# Patient Record
Sex: FEMALE
[De-identification: names, ages, dates, MRNs, and addresses within clinical notes are randomized per-mention and may not be internally consistent; named-entity substitution may affect disease eponyms.]

---

## 2020-04-16 ENCOUNTER — RX ONLY (OUTPATIENT)
Age: 50
Setting detail: RX ONLY
End: 2020-04-16

## 2020-07-27 ENCOUNTER — RX ONLY (OUTPATIENT)
Age: 50
Setting detail: RX ONLY
End: 2020-07-27

## 2020-07-27 RX ORDER — MINOCYCLINE HYDROCHLORIDE 100 MG/1
CAPSULE ORAL
Qty: 30 | Refills: 1 | Status: ERX | COMMUNITY
Start: 2020-07-27

## 2020-08-06 ENCOUNTER — APPOINTMENT (RX ONLY)
Dept: URBAN - METROPOLITAN AREA CLINIC 28 | Facility: CLINIC | Age: 50
Setting detail: DERMATOLOGY
End: 2020-08-06

## 2020-08-06 DIAGNOSIS — L73.9 FOLLICULAR DISORDER, UNSPECIFIED: ICD-10-CM

## 2020-08-06 DIAGNOSIS — Z79.899 OTHER LONG TERM (CURRENT) DRUG THERAPY: ICD-10-CM

## 2020-08-06 DIAGNOSIS — L663 OTHER SPECIFIED DISEASES OF HAIR AND HAIR FOLLICLES: ICD-10-CM

## 2020-08-06 DIAGNOSIS — L738 OTHER SPECIFIED DISEASES OF HAIR AND HAIR FOLLICLES: ICD-10-CM

## 2020-08-06 DIAGNOSIS — L70.8 OTHER ACNE: ICD-10-CM | Status: IMPROVED

## 2020-08-06 DIAGNOSIS — L72.0 EPIDERMAL CYST: ICD-10-CM

## 2020-08-06 PROBLEM — L02.821 FURUNCLE OF HEAD [ANY PART, EXCEPT FACE]: Status: ACTIVE | Noted: 2020-08-06

## 2020-08-06 PROCEDURE — ? ACNE SURGERY (MULTIPLE LESIONS)

## 2020-08-06 PROCEDURE — ? PRESCRIPTION

## 2020-08-06 PROCEDURE — 99214 OFFICE O/P EST MOD 30 MIN: CPT | Mod: 25

## 2020-08-06 PROCEDURE — ? HIGH RISK MEDICATION MONITORING

## 2020-08-06 PROCEDURE — ? PRESCRIPTION MEDICATION MANAGEMENT

## 2020-08-06 PROCEDURE — 10040 EXTRACTION: CPT

## 2020-08-06 PROCEDURE — ? COUNSELING

## 2020-08-06 RX ORDER — SPIRONOLACTONE 100 MG/1
TABLET, FILM COATED ORAL
Qty: 30 | Refills: 3 | Status: ERX | COMMUNITY
Start: 2020-08-06

## 2020-08-06 RX ORDER — CLOBETASOL PROPIONATE 0.5 MG/G
AEROSOL, FOAM TOPICAL
Qty: 1 | Refills: 3 | Status: ERX | COMMUNITY
Start: 2020-08-06

## 2020-08-06 RX ORDER — MINOCYCLINE HYDROCHLORIDE 100 MG/1
CAPSULE ORAL
Qty: 30 | Refills: 3 | Status: ERX | COMMUNITY
Start: 2020-08-06

## 2020-08-06 RX ORDER — BENZOYL PEROXIDE 100 MG/ML
LIQUID TOPICAL DAILY
Qty: 1 | Refills: 3 | Status: ERX | COMMUNITY
Start: 2020-08-06

## 2020-08-06 RX ADMIN — BENZOYL PEROXIDE: 100 LIQUID TOPICAL at 00:00

## 2020-08-06 RX ADMIN — MINOCYCLINE HYDROCHLORIDE: 100 CAPSULE ORAL at 00:00

## 2020-08-06 RX ADMIN — CLOBETASOL PROPIONATE: 0.5 AEROSOL, FOAM TOPICAL at 00:00

## 2020-08-06 RX ADMIN — SPIRONOLACTONE 1: 100 TABLET, FILM COATED ORAL at 00:00

## 2020-08-06 ASSESSMENT — LOCATION ZONE DERM
LOCATION ZONE: LIP
LOCATION ZONE: FACE
LOCATION ZONE: SCALP

## 2020-08-06 ASSESSMENT — LOCATION SIMPLE DESCRIPTION DERM
LOCATION SIMPLE: RIGHT LIP
LOCATION SIMPLE: POSTERIOR SCALP
LOCATION SIMPLE: LEFT CHEEK
LOCATION SIMPLE: LEFT LIP

## 2020-08-06 ASSESSMENT — LOCATION DETAILED DESCRIPTION DERM
LOCATION DETAILED: LEFT UPPER CUTANEOUS LIP
LOCATION DETAILED: RIGHT INFERIOR VERMILION LIP
LOCATION DETAILED: LEFT INFERIOR CENTRAL MALAR CHEEK
LOCATION DETAILED: MID-OCCIPITAL SCALP

## 2020-08-06 NOTE — PROCEDURE: ACNE SURGERY (MULTIPLE LESIONS)
Total Number Of Lesions Treated: 2
Consent was obtained and risks were reviewed including but not limited to scarring, infection, bleeding, scabbing, incomplete removal, and allergy to anesthesia.
Acne Type: Milial cysts
Render Post-Care Instructions In Note?: no
Extraction Method: 25 gauge needle
Hemostasis: Electrocautery
Post-Care Instructions: I reviewed with the patient in detail post-care instructions. Patient is to keep the treatment areas dry overnight, and then apply bacitracin twice daily until healed. Patient may apply hydrogen peroxide soaks to remove any crusting.
Detail Level: Detailed
Prep Text (Optional): Prior to removal the treatment areas were prepped in the usual fashion.
Render Number Of Lesions Treated: yes

## 2020-08-06 NOTE — PROCEDURE: PRESCRIPTION MEDICATION MANAGEMENT
Render In Strict Bullet Format?: No
Detail Level: Zone
Initiate Treatment: Benzoyl Peroxide 10%  Cleanser - wash face QDAY
Discontinue Regimen: sulfur wash
Continue Regimen: Spironolactone 100mg- take one tab po QDAY
Initiate Treatment: Tovet Foam- apply a thin layer QDAY to AA of scalp and neck
Discontinue Regimen: Cordran Lotion
Continue Regimen: Minocycline 100mg- take one capsule po QDAY with food

## 2020-08-06 NOTE — PROCEDURE: HIGH RISK MEDICATION MONITORING
Azithromycin Pregnancy And Lactation Text: This medication is considered safe during pregnancy and is also secreted in breast milk.
Tremfya Pregnancy And Lactation Text: The risk during pregnancy and breastfeeding is uncertain with this medication.
Tazorac Counseling:  Patient advised that medication is irritating and drying.  Patient may need to apply sparingly and wash off after an hour before eventually leaving it on overnight.  The patient verbalized understanding of the proper use and possible adverse effects of tazorac.  All of the patient's questions and concerns were addressed.
Minocycline Pregnancy And Lactation Text: This medication is Pregnancy Category D and not consider safe during pregnancy. It is also excreted in breast milk.
Thalidomide Counseling: I discussed with the patient the risks of thalidomide including but not limited to birth defects, anxiety, weakness, chest pain, dizziness, cough and severe allergy.
Itraconazole Pregnancy And Lactation Text: This medication is Pregnancy Category C and it isn't know if it is safe during pregnancy. It is also excreted in breast milk.
Methotrexate Pregnancy And Lactation Text: This medication is Pregnancy Category X and is known to cause fetal harm. This medication is excreted in breast milk.
Taltz Counseling: I discussed with the patient the risks of ixekizumab including but not limited to immunosuppression, serious infections, worsening of inflammatory bowel disease and drug reactions.  The patient understands that monitoring is required including a PPD at baseline and must alert us or the primary physician if symptoms of infection or other concerning signs are noted.
Hydroquinone Counseling:  Patient advised that medication may result in skin irritation, lightening (hypopigmentation), dryness, and burning.  In the event of skin irritation, the patient was advised to reduce the amount of the drug applied or use it less frequently.  Rarely, spots that are treated with hydroquinone can become darker (pseudoochronosis).  Should this occur, patient instructed to stop medication and call the office. The patient verbalized understanding of the proper use and possible adverse effects of hydroquinone.  All of the patient's questions and concerns were addressed.
Humira Pregnancy And Lactation Text: This medication is Pregnancy Category B and is considered safe during pregnancy. It is unknown if this medication is excreted in breast milk.
Albendazole Pregnancy And Lactation Text: This medication is Pregnancy Category C and it isn't known if it is safe during pregnancy. It is also excreted in breast milk.
Quinolones Counseling:  I discussed with the patient the risks of fluoroquinolones including but not limited to GI upset, allergic reaction, drug rash, diarrhea, dizziness, photosensitivity, yeast infections, liver function test abnormalities, tendonitis/tendon rupture.
Nsaids Pregnancy And Lactation Text: These medications are considered safe up to 30 weeks gestation. It is excreted in breast milk.
Clofazimine Pregnancy And Lactation Text: This medication is Pregnancy Category C and isn't considered safe during pregnancy. It is excreted in breast milk.
Ivermectin Counseling:  Patient instructed to take medication on an empty stomach with a full glass of water.  Patient informed of potential adverse effects including but not limited to nausea, diarrhea, dizziness, itching, and swelling of the extremities or lymph nodes.  The patient verbalized understanding of the proper use and possible adverse effects of ivermectin.  All of the patient's questions and concerns were addressed.
Prednisone Counseling:  I discussed with the patient the risks of prolonged use of prednisone including but not limited to weight gain, insomnia, osteoporosis, mood changes, diabetes, susceptibility to infection, glaucoma and high blood pressure.  In cases where prednisone use is prolonged, patients should be monitored with blood pressure checks, serum glucose levels and an eye exam.  Additionally, the patient may need to be placed on GI prophylaxis, PCP prophylaxis, and calcium and vitamin D supplementation and/or a bisphosphonate.  The patient verbalized understanding of the proper use and the possible adverse effects of prednisone.  All of the patient's questions and concerns were addressed.
Bactrim Counseling:  I discussed with the patient the risks of sulfa antibiotics including but not limited to GI upset, allergic reaction, drug rash, diarrhea, dizziness, photosensitivity, and yeast infections.  Rarely, more serious reactions can occur including but not limited to aplastic anemia, agranulocytosis, methemoglobinemia, blood dyscrasias, liver or kidney failure, lung infiltrates or desquamative/blistering drug rashes.
Tazorac Pregnancy And Lactation Text: This medication is not safe during pregnancy. It is unknown if this medication is excreted in breast milk.
Xeljanz Counseling: I discussed with the patient the risks of Xeljanz therapy including increased risk of infection, liver issues, headache, diarrhea, or cold symptoms. Live vaccines should be avoided. They were instructed to call if they have any problems.
Thalidomide Pregnancy And Lactation Text: This medication is Pregnancy Category X and is absolutely contraindicated during pregnancy. It is unknown if it is excreted in breast milk.
Ilumya Counseling: I discussed with the patient the risks of tildrakizumab including but not limited to immunosuppression, malignancy, posterior leukoencephalopathy syndrome, and serious infections.  The patient understands that monitoring is required including a PPD at baseline and must alert us or the primary physician if symptoms of infection or other concerning signs are noted.
Ketoconazole Counseling:   Patient counseled regarding improving absorption with orange juice.  Adverse effects include but are not limited to breast enlargement, headache, diarrhea, nausea, upset stomach, liver function test abnormalities, taste disturbance, and stomach pain.  There is a rare possibility of liver failure that can occur when taking ketoconazole. The patient understands that monitoring of LFTs may be required, especially at baseline. The patient verbalized understanding of the proper use and possible adverse effects of ketoconazole.  All of the patient's questions and concerns were addressed.
Colchicine Counseling:  Patient counseled regarding adverse effects including but not limited to stomach upset (nausea, vomiting, stomach pain, or diarrhea).  Patient instructed to limit alcohol consumption while taking this medication.  Colchicine may reduce blood counts especially with prolonged use.  The patient understands that monitoring of kidney function and blood counts may be required, especially at baseline. The patient verbalized understanding of the proper use and possible adverse effects of colchicine.  All of the patient's questions and concerns were addressed.
Hydroquinone Pregnancy And Lactation Text: This medication has not been assigned a Pregnancy Risk Category but animal studies failed to show danger with the topical medication. It is unknown if the medication is excreted in breast milk.
Odomzo Counseling- I discussed with the patient the risks of Odomzo including but not limited to nausea, vomiting, diarrhea, constipation, weight loss, changes in the sense of taste, decreased appetite, muscle spasms, and hair loss.  The patient verbalized understanding of the proper use and possible adverse effects of Odomzo.  All of the patient's questions and concerns were addressed.
Xelvickiz Pregnancy And Lactation Text: This medication is Pregnancy Category D and is not considered safe during pregnancy.  The risk during breast feeding is also uncertain.
Topical Clindamycin Counseling: Patient counseled that this medication may cause skin irritation or allergic reactions.  In the event of skin irritation, the patient was advised to reduce the amount of the drug applied or use it less frequently.   The patient verbalized understanding of the proper use and possible adverse effects of clindamycin.  All of the patient's questions and concerns were addressed.
Benzoyl Peroxide Counseling: Patient counseled that medicine may cause skin irritation and bleach clothing.  In the event of skin irritation, the patient was advised to reduce the amount of the drug applied or use it less frequently.   The patient verbalized understanding of the proper use and possible adverse effects of benzoyl peroxide.  All of the patient's questions and concerns were addressed.
Prednisone Pregnancy And Lactation Text: This medication is Pregnancy Category C and it isn't know if it is safe during pregnancy. This medication is excreted in breast milk.
Bactrim Pregnancy And Lactation Text: This medication is Pregnancy Category D and is known to cause fetal risk.  It is also excreted in breast milk.
Valtrex Counseling: I discussed with the patient the risks of valacyclovir including but not limited to kidney damage, nausea, vomiting and severe allergy.  The patient understands that if the infection seems to be worsening or is not improving, they are to call.
Imiquimod Counseling:  I discussed with the patient the risks of imiquimod including but not limited to erythema, scaling, itching, weeping, crusting, and pain.  Patient understands that the inflammatory response to imiquimod is variable from person to person and was educated regarded proper titration schedule.  If flu-like symptoms develop, patient knows to discontinue the medication and contact us.
Xolair Counseling:  Patient informed of potential adverse effects including but not limited to fever, muscle aches, rash and allergic reactions.  The patient verbalized understanding of the proper use and possible adverse effects of Xolair.  All of the patient's questions and concerns were addressed.
Ketoconazole Pregnancy And Lactation Text: This medication is Pregnancy Category C and it isn't know if it is safe during pregnancy. It is also excreted in breast milk and breast feeding isn't recommended.
Topical Clindamycin Pregnancy And Lactation Text: This medication is Pregnancy Category B and is considered safe during pregnancy. It is unknown if it is excreted in breast milk.
Infliximab Counseling:  I discussed with the patient the risks of infliximab including but not limited to myelosuppression, immunosuppression, autoimmune hepatitis, demyelinating diseases, lymphoma, and serious infections.  The patient understands that monitoring is required including a PPD at baseline and must alert us or the primary physician if symptoms of infection or other concerning signs are noted.
Benzoyl Peroxide Pregnancy And Lactation Text: This medication is Pregnancy Category C. It is unknown if benzoyl peroxide is excreted in breast milk.
Cephalexin Counseling: I counseled the patient regarding use of cephalexin as an antibiotic for prophylactic and/or therapeutic purposes. Cephalexin (commonly prescribed under brand name Keflex) is a cephalosporin antibiotic which is active against numerous classes of bacteria, including most skin bacteria. Side effects may include nausea, diarrhea, gastrointestinal upset, rash, hives, yeast infections, and in rare cases, hepatitis, kidney disease, seizures, fever, confusion, neurologic symptoms, and others. Patients with severe allergies to penicillin medications are cautioned that there is about a 10% incidence of cross-reactivity with cephalosporins. When possible, patients with penicillin allergies should use alternatives to cephalosporins for antibiotic therapy.
Rifampin Counseling: I discussed with the patient the risks of rifampin including but not limited to liver damage, kidney damage, red-orange body fluids, nausea/vomiting and severe allergy.
Imiquimod Pregnancy And Lactation Text: This medication is Pregnancy Category C. It is unknown if this medication is excreted in breast milk.
Terbinafine Counseling: Patient counseling regarding adverse effects of terbinafine including but not limited to headache, diarrhea, rash, upset stomach, liver function test abnormalities, itching, taste/smell disturbance, nausea, abdominal pain, and flatulence.  There is a rare possibility of liver failure that can occur when taking terbinafine.  The patient understands that a baseline LFT and kidney function test may be required. The patient verbalized understanding of the proper use and possible adverse effects of terbinafine.  All of the patient's questions and concerns were addressed.
Valtrex Pregnancy And Lactation Text: this medication is Pregnancy Category B and is considered safe during pregnancy. This medication is not directly found in breast milk but it's metabolite acyclovir is present.
Dapsone Counseling: I discussed with the patient the risks of dapsone including but not limited to hemolytic anemia, agranulocytosis, rashes, methemoglobinemia, kidney failure, peripheral neuropathy, headaches, GI upset, and liver toxicity.  Patients who start dapsone require monitoring including baseline LFTs and weekly CBCs for the first month, then every month thereafter.  The patient verbalized understanding of the proper use and possible adverse effects of dapsone.  All of the patient's questions and concerns were addressed.
Otezla Counseling: The side effects of Otezla were discussed with the patient, including but not limited to worsening or new depression, weight loss, diarrhea, nausea, upper respiratory tract infection, and headache. Patient instructed to call the office should any adverse effect occur.  The patient verbalized understanding of the proper use and possible adverse effects of Otezla.  All the patient's questions and concerns were addressed.
Cephalexin Pregnancy And Lactation Text: This medication is Pregnancy Category B and considered safe during pregnancy.  It is also excreted in breast milk but can be used safely for shorter doses.
Topical Sulfur Applications Counseling: Topical Sulfur Counseling: Patient counseled that this medication may cause skin irritation or allergic reactions.  In the event of skin irritation, the patient was advised to reduce the amount of the drug applied or use it less frequently.   The patient verbalized understanding of the proper use and possible adverse effects of topical sulfur application.  All of the patient's questions and concerns were addressed.
Xolair Pregnancy And Lactation Text: This medication is Pregnancy Category B and is considered safe during pregnancy. This medication is excreted in breast milk.
Rifampin Pregnancy And Lactation Text: This medication is Pregnancy Category C and it isn't know if it is safe during pregnancy. It is also excreted in breast milk and should not be used if you are breast feeding.
Carac Counseling:  I discussed with the patient the risks of Carac including but not limited to erythema, scaling, itching, weeping, crusting, and pain.
Dapsone Pregnancy And Lactation Text: This medication is Pregnancy Category C and is not considered safe during pregnancy or breast feeding.
Minoxidil Counseling: Minoxidil is a topical medication which can increase blood flow where it is applied. It is uncertain how this medication increases hair growth. Side effects are uncommon and include stinging and allergic reactions.
Sarecycline Counseling: Patient advised regarding possible photosensitivity and discoloration of the teeth, skin, lips, tongue and gums.  Patient instructed to avoid sunlight, if possible.  When exposed to sunlight, patients should wear protective clothing, sunglasses, and sunscreen.  The patient was instructed to call the office immediately if the following severe adverse effects occur:  hearing changes, easy bruising/bleeding, severe headache, or vision changes.  The patient verbalized understanding of the proper use and possible adverse effects of sarecycline.  All of the patient's questions and concerns were addressed.
Rituxan Counseling:  I discussed with the patient the risks of Rituxan infusions. Side effects can include infusion reactions, severe drug rashes including mucocutaneous reactions, reactivation of latent hepatitis and other infections and rarely progressive multifocal leukoencephalopathy.  All of the patient's questions and concerns were addressed.
Otezla Pregnancy And Lactation Text: This medication is Pregnancy Category C and it isn't known if it is safe during pregnancy. It is unknown if it is excreted in breast milk.
Carac Pregnancy And Lactation Text: This medication is Pregnancy Category X and contraindicated in pregnancy and in women who may become pregnant. It is unknown if this medication is excreted in breast milk.
Clindamycin Counseling: I counseled the patient regarding use of clindamycin as an antibiotic for prophylactic and/or therapeutic purposes. Clindamycin is active against numerous classes of bacteria, including skin bacteria. Side effects may include nausea, diarrhea, gastrointestinal upset, rash, hives, yeast infections, and in rare cases, colitis.
Terbinafine Pregnancy And Lactation Text: This medication is Pregnancy Category B and is considered safe during pregnancy. It is also excreted in breast milk and breast feeding isn't recommended.
Topical Sulfur Applications Pregnancy And Lactation Text: This medication is Pregnancy Category C and has an unknown safety profile during pregnancy. It is unknown if this topical medication is excreted in breast milk.
Use Enhanced Medication Counseling?: No
Erivedge Counseling- I discussed with the patient the risks of Erivedge including but not limited to nausea, vomiting, diarrhea, constipation, weight loss, changes in the sense of taste, decreased appetite, muscle spasms, and hair loss.  The patient verbalized understanding of the proper use and possible adverse effects of Erivedge.  All of the patient's questions and concerns were addressed.
Cimzia Counseling:  I discussed with the patient the risks of Cimzia including but not limited to immunosuppression, allergic reactions and infections.  The patient understands that monitoring is required including a PPD at baseline and must alert us or the primary physician if symptoms of infection or other concerning signs are noted.
Acitretin Counseling:  I discussed with the patient the risks of acitretin including but not limited to hair loss, dry lips/skin/eyes, liver damage, hyperlipidemia, depression/suicidal ideation, photosensitivity.  Serious rare side effects can include but are not limited to pancreatitis, pseudotumor cerebri, bony changes, clot formation/stroke/heart attack.  Patient understands that alcohol is contraindicated since it can result in liver toxicity and significantly prolong the elimination of the drug by many years.
Azathioprine Counseling:  I discussed with the patient the risks of azathioprine including but not limited to myelosuppression, immunosuppression, hepatotoxicity, lymphoma, and infections.  The patient understands that monitoring is required including baseline LFTs, Creatinine, possible TPMP genotyping and weekly CBCs for the first month and then every 2 weeks thereafter.  The patient verbalized understanding of the proper use and possible adverse effects of azathioprine.  All of the patient's questions and concerns were addressed.
Zyclara Counseling:  I discussed with the patient the risks of imiquimod including but not limited to erythema, scaling, itching, weeping, crusting, and pain.  Patient understands that the inflammatory response to imiquimod is variable from person to person and was educated regarded proper titration schedule.  If flu-like symptoms develop, patient knows to discontinue the medication and contact us.
5-Fu Counseling: 5-Fluorouracil Counseling:  I discussed with the patient the risks of 5-fluorouracil including but not limited to erythema, scaling, itching, weeping, crusting, and pain.
Clindamycin Pregnancy And Lactation Text: This medication can be used in pregnancy if certain situations. Clindamycin is also present in breast milk.
Oxybutynin Counseling:  I discussed with the patient the risks of oxybutynin including but not limited to skin rash, drowsiness, dry mouth, difficulty urinating, and blurred vision.
Detail Level: Detailed
Picato Counseling:  I discussed with the patient the risks of Picato including but not limited to erythema, scaling, itching, weeping, crusting, and pain.
Rituxan Pregnancy And Lactation Text: This medication is Pregnancy Category C and it isn't know if it is safe during pregnancy. It is unknown if this medication is excreted in breast milk but similar antibodies are known to be excreted.
Azathioprine Pregnancy And Lactation Text: This medication is Pregnancy Category D and isn't considered safe during pregnancy. It is unknown if this medication is excreted in breast milk.
Acitretin Pregnancy And Lactation Text: This medication is Pregnancy Category X and should not be given to women who are pregnant or may become pregnant in the future. This medication is excreted in breast milk.
Cimetidine Counseling:  I discussed with the patient the risks of Cimetidine including but not limited to gynecomastia, headache, diarrhea, nausea, drowsiness, arrhythmias, pancreatitis, skin rashes, psychosis, bone marrow suppression and kidney toxicity.
Siliq Counseling:  I discussed with the patient the risks of Siliq including but not limited to new or worsening depression, suicidal thoughts and behavior, immunosuppression, malignancy, posterior leukoencephalopathy syndrome, and serious infections.  The patient understands that monitoring is required including a PPD at baseline and must alert us or the primary physician if symptoms of infection or other concerning signs are noted. There is also a special program designed to monitor depression which is required with Siliq.
Cimzia Pregnancy And Lactation Text: This medication crosses the placenta but can be considered safe in certain situations. Cimzia may be excreted in breast milk.
Doxycycline Counseling:  Patient counseled regarding possible photosensitivity and increased risk for sunburn.  Patient instructed to avoid sunlight, if possible.  When exposed to sunlight, patients should wear protective clothing, sunglasses, and sunscreen.  The patient was instructed to call the office immediately if the following severe adverse effects occur:  hearing changes, easy bruising/bleeding, severe headache, or vision changes.  The patient verbalized understanding of the proper use and possible adverse effects of doxycycline.  All of the patient's questions and concerns were addressed.
Tetracycline Counseling: Patient counseled regarding possible photosensitivity and increased risk for sunburn.  Patient instructed to avoid sunlight, if possible.  When exposed to sunlight, patients should wear protective clothing, sunglasses, and sunscreen.  The patient was instructed to call the office immediately if the following severe adverse effects occur:  hearing changes, easy bruising/bleeding, severe headache, or vision changes.  The patient verbalized understanding of the proper use and possible adverse effects of tetracycline.  All of the patient's questions and concerns were addressed. Patient understands to avoid pregnancy while on therapy due to potential birth defects.
Cosentyx Counseling:  I discussed with the patient the risks of Cosentyx including but not limited to worsening of Crohn's disease, immunosuppression, allergic reactions and infections.  The patient understands that monitoring is required including a PPD at baseline and must alert us or the primary physician if symptoms of infection or other concerning signs are noted.
Cellcept Counseling:  I discussed with the patient the risks of mycophenolate mofetil including but not limited to infection/immunosuppression, GI upset, hypokalemia, hypercholesterolemia, bone marrow suppression, lymphoproliferative disorders, malignancy, GI ulceration/bleed/perforation, colitis, interstitial lung disease, kidney failure, progressive multifocal leukoencephalopathy, and birth defects.  The patient understands that monitoring is required including a baseline creatinine and regular CBC testing. In addition, patient must alert us immediately if symptoms of infection or other concerning signs are noted.
Gabapentin Counseling: I discussed with the patient the risks of gabapentin including but not limited to dizziness, somnolence, fatigue and ataxia.
Birth Control Pills Counseling: Birth Control Pill Counseling: I discussed with the patient the potential side effects of OCPs including but not limited to increased risk of stroke, heart attack, thrombophlebitis, deep venous thrombosis, hepatic adenomas, breast changes, GI upset, headaches, and depression.  The patient verbalized understanding of the proper use and possible adverse effects of OCPs. All of the patient's questions and concerns were addressed.
Drysol Counseling:  I discussed with the patient the risks of drysol/aluminum chloride including but not limited to skin rash, itching, irritation, burning.
Protopic Counseling: Patient may experience a mild burning sensation during topical application. Protopic is not approved in children less than 2 years of age. There have been case reports of hematologic and skin malignancies in patients using topical calcineurin inhibitors although causality is questionable.
Doxycycline Pregnancy And Lactation Text: This medication is Pregnancy Category D and not consider safe during pregnancy. It is also excreted in breast milk but is considered safe for shorter treatment courses.
Bexarotene Counseling:  I discussed with the patient the risks of bexarotene including but not limited to hair loss, dry lips/skin/eyes, liver abnormalities, hyperlipidemia, pancreatitis, depression/suicidal ideation, photosensitivity, drug rash/allergic reactions, hypothyroidism, anemia, leukopenia, infection, cataracts, and teratogenicity.  Patient understands that they will need regular blood tests to check lipid profile, liver function tests, white blood cell count, thyroid function tests and pregnancy test if applicable.
Doxepin Counseling:  Patient advised that the medication is sedating and not to drive a car after taking this medication. Patient informed of potential adverse effects including but not limited to dry mouth, urinary retention, and blurry vision.  The patient verbalized understanding of the proper use and possible adverse effects of doxepin.  All of the patient's questions and concerns were addressed.
Simponi Counseling:  I discussed with the patient the risks of golimumab including but not limited to myelosuppression, immunosuppression, autoimmune hepatitis, demyelinating diseases, lymphoma, and serious infections.  The patient understands that monitoring is required including a PPD at baseline and must alert us or the primary physician if symptoms of infection or other concerning signs are noted.
Drysol Pregnancy And Lactation Text: This medication is considered safe during pregnancy and breast feeding.
Erythromycin Counseling:  I discussed with the patient the risks of erythromycin including but not limited to GI upset, allergic reaction, drug rash, diarrhea, increase in liver enzymes, and yeast infections.
Birth Control Pills Pregnancy And Lactation Text: This medication should be avoided if pregnant and for the first 30 days post-partum.
Protopic Pregnancy And Lactation Text: This medication is Pregnancy Category C. It is unknown if this medication is excreted in breast milk when applied topically.
Doxepin Pregnancy And Lactation Text: This medication is Pregnancy Category C and it isn't known if it is safe during pregnancy. It is also excreted in breast milk and breast feeding isn't recommended.
Glycopyrrolate Counseling:  I discussed with the patient the risks of glycopyrrolate including but not limited to skin rash, drowsiness, dry mouth, difficulty urinating, and blurred vision.
Cyclophosphamide Counseling:  I discussed with the patient the risks of cyclophosphamide including but not limited to hair loss, hormonal abnormalities, decreased fertility, abdominal pain, diarrhea, nausea and vomiting, bone marrow suppression and infection. The patient understands that monitoring is required while taking this medication.
Dupixent Counseling: I discussed with the patient the risks of dupilumab including but not limited to eye infection and irritation, cold sores, injection site reactions, worsening of asthma, allergic reactions and increased risk of parasitic infection.  Live vaccines should be avoided while taking dupilumab. Dupilumab will also interact with certain medications such as warfarin and cyclosporine. The patient understands that monitoring is required and they must alert us or the primary physician if symptoms of infection or other concerning signs are noted.
Bexarotene Pregnancy And Lactation Text: This medication is Pregnancy Category X and should not be given to women who are pregnant or may become pregnant. This medication should not be used if you are breast feeding.
Fluconazole Counseling:  Patient counseled regarding adverse effects of fluconazole including but not limited to headache, diarrhea, nausea, upset stomach, liver function test abnormalities, taste disturbance, and stomach pain.  There is a rare possibility of liver failure that can occur when taking fluconazole.  The patient understands that monitoring of LFTs and kidney function test may be required, especially at baseline. The patient verbalized understanding of the proper use and possible adverse effects of fluconazole.  All of the patient's questions and concerns were addressed.
Elidel Counseling: Patient may experience a mild burning sensation during topical application. Elidel is not approved in children less than 2 years of age. There have been case reports of hematologic and skin malignancies in patients using topical calcineurin inhibitors although causality is questionable.
Solaraze Counseling:  I discussed with the patient the risks of Solaraze including but not limited to erythema, scaling, itching, weeping, crusting, and pain.
Spironolactone Counseling: Patient advised regarding risks of diarrhea, abdominal pain, hyperkalemia, birth defects (for female patients), liver toxicity and renal toxicity. The patient may need blood work to monitor liver and kidney function and potassium levels while on therapy. The patient verbalized understanding of the proper use and possible adverse effects of spironolactone.  All of the patient's questions and concerns were addressed.
Erythromycin Pregnancy And Lactation Text: This medication is Pregnancy Category B and is considered safe during pregnancy. It is also excreted in breast milk.
Glycopyrrolate Pregnancy And Lactation Text: This medication is Pregnancy Category B and is considered safe during pregnancy. It is unknown if it is excreted breast milk.
Cyclophosphamide Pregnancy And Lactation Text: This medication is Pregnancy Category D and it isn't considered safe during pregnancy. This medication is excreted in breast milk.
Isotretinoin Counseling: Patient should get monthly blood tests, not donate blood, not drive at night if vision affected, not share medication, and not undergo elective surgery for 6 months after tx completed. Side effects reviewed, pt to contact office should one occur.
Skyrizi Counseling: I discussed with the patient the risks of risankizumab-rzaa including but not limited to immunosuppression, and serious infections.  The patient understands that monitoring is required including a PPD at baseline and must alert us or the primary physician if symptoms of infection or other concerning signs are noted.
Dupixent Pregnancy And Lactation Text: This medication likely crosses the placenta but the risk for the fetus is uncertain. This medication is excreted in breast milk.
Hydroxyzine Counseling: Patient advised that the medication is sedating and not to drive a car after taking this medication.  Patient informed of potential adverse effects including but not limited to dry mouth, urinary retention, and blurry vision.  The patient verbalized understanding of the proper use and possible adverse effects of hydroxyzine.  All of the patient's questions and concerns were addressed.
Metronidazole Counseling:  I discussed with the patient the risks of metronidazole including but not limited to seizures, nausea/vomiting, a metallic taste in the mouth, nausea/vomiting and severe allergy.
Spironolactone Pregnancy And Lactation Text: This medication can cause feminization of the male fetus and should be avoided during pregnancy. The active metabolite is also found in breast milk.
Solaraze Pregnancy And Lactation Text: This medication is Pregnancy Category B and is considered safe. There is some data to suggest avoiding during the third trimester. It is unknown if this medication is excreted in breast milk.
Enbrel Counseling:  I discussed with the patient the risks of etanercept including but not limited to myelosuppression, immunosuppression, autoimmune hepatitis, demyelinating diseases, lymphoma, and infections.  The patient understands that monitoring is required including a PPD at baseline and must alert us or the primary physician if symptoms of infection or other concerning signs are noted.
Hydroxyzine Pregnancy And Lactation Text: This medication is not safe during pregnancy and should not be taken. It is also excreted in breast milk and breast feeding isn't recommended.
Isotretinoin Pregnancy And Lactation Text: This medication is Pregnancy Category X and is considered extremely dangerous during pregnancy. It is unknown if it is excreted in breast milk.
Cyclosporine Counseling:  I discussed with the patient the risks of cyclosporine including but not limited to hypertension, gingival hyperplasia,myelosuppression, immunosuppression, liver damage, kidney damage, neurotoxicity, lymphoma, and serious infections. The patient understands that monitoring is required including baseline blood pressure, CBC, CMP, lipid panel and uric acid, and then 1-2 times monthly CMP and blood pressure.
Hydroxychloroquine Counseling:  I discussed with the patient that a baseline ophthalmologic exam is needed at the start of therapy and every year thereafter while on therapy. A CBC may also be warranted for monitoring.  The side effects of this medication were discussed with the patient, including but not limited to agranulocytosis, aplastic anemia, seizures, rashes, retinopathy, and liver toxicity. Patient instructed to call the office should any adverse effect occur.  The patient verbalized understanding of the proper use and possible adverse effects of Plaquenil.  All the patient's questions and concerns were addressed.
Griseofulvin Counseling:  I discussed with the patient the risks of griseofulvin including but not limited to photosensitivity, cytopenia, liver damage, nausea/vomiting and severe allergy.  The patient understands that this medication is best absorbed when taken with a fatty meal (e.g., ice cream or french fries).
Arava Counseling:  Patient counseled regarding adverse effects of Arava including but not limited to nausea, vomiting, abnormalities in liver function tests. Patients may develop mouth sores, rash, diarrhea, and abnormalities in blood counts. The patient understands that monitoring is required including LFTs and blood counts.  There is a rare possibility of scarring of the liver and lung problems that can occur when taking methotrexate. Persistent nausea, loss of appetite, pale stools, dark urine, cough, and shortness of breath should be reported immediately. Patient advised to discontinue Arava treatment and consult with a physician prior to attempting conception. The patient will have to undergo a treatment to eliminate Arava from the body prior to conception.
Topical Retinoid counseling:  Patient advised to apply a pea-sized amount only at bedtime and wait 30 minutes after washing their face before applying.  If too drying, patient may add a non-comedogenic moisturizer. The patient verbalized understanding of the proper use and possible adverse effects of retinoids.  All of the patient's questions and concerns were addressed.
SSKI Counseling:  I discussed with the patient the risks of SSKI including but not limited to thyroid abnormalities, metallic taste, GI upset, fever, headache, acne, arthralgias, paraesthesias, lymphadenopathy, easy bleeding, arrhythmias, and allergic reaction.
Eucrisa Counseling: Patient may experience a mild burning sensation during topical application. Eucrisa is not approved in children less than 2 years of age.
Metronidazole Pregnancy And Lactation Text: This medication is Pregnancy Category B and considered safe during pregnancy.  It is also excreted in breast milk.
High Dose Vitamin A Counseling: Side effects reviewed, pt to contact office should one occur.
Minocycline Counseling: Patient advised regarding possible photosensitivity and discoloration of the teeth, skin, lips, tongue and gums.  Patient instructed to avoid sunlight, if possible.  When exposed to sunlight, patients should wear protective clothing, sunglasses, and sunscreen.  The patient was instructed to call the office immediately if the following severe adverse effects occur:  hearing changes, easy bruising/bleeding, severe headache, or vision changes.  The patient verbalized understanding of the proper use and possible adverse effects of minocycline.  All of the patient's questions and concerns were addressed.
Stelara Counseling:  I discussed with the patient the risks of ustekinumab including but not limited to immunosuppression, malignancy, posterior leukoencephalopathy syndrome, and serious infections.  The patient understands that monitoring is required including a PPD at baseline and must alert us or the primary physician if symptoms of infection or other concerning signs are noted.
Hydroxychloroquine Pregnancy And Lactation Text: This medication has been shown to cause fetal harm but it isn't assigned a Pregnancy Risk Category. There are small amounts excreted in breast milk.
Azithromycin Counseling:  I discussed with the patient the risks of azithromycin including but not limited to GI upset, allergic reaction, drug rash, diarrhea, and yeast infections.
Tremfya Counseling: I discussed with the patient the risks of guselkumab including but not limited to immunosuppression, serious infections, worsening of inflammatory bowel disease and drug reactions.  The patient understands that monitoring is required including a PPD at baseline and must alert us or the primary physician if symptoms of infection or other concerning signs are noted.
Griseofulvin Pregnancy And Lactation Text: This medication is Pregnancy Category X and is known to cause serious birth defects. It is unknown if this medication is excreted in breast milk but breast feeding should be avoided.
Sski Pregnancy And Lactation Text: This medication is Pregnancy Category D and isn't considered safe during pregnancy. It is excreted in breast milk.
Humira Counseling:  I discussed with the patient the risks of adalimumab including but not limited to myelosuppression, immunosuppression, autoimmune hepatitis, demyelinating diseases, lymphoma, and serious infections.  The patient understands that monitoring is required including a PPD at baseline and must alert us or the primary physician if symptoms of infection or other concerning signs are noted.
Albendazole Counseling:  I discussed with the patient the risks of albendazole including but not limited to cytopenia, kidney damage, nausea/vomiting and severe allergy.  The patient understands that this medication is being used in an off-label manner.
Methotrexate Counseling:  Patient counseled regarding adverse effects of methotrexate including but not limited to nausea, vomiting, abnormalities in liver function tests. Patients may develop mouth sores, rash, diarrhea, and abnormalities in blood counts. The patient understands that monitoring is required including LFT's and blood counts.  There is a rare possibility of scarring of the liver and lung problems that can occur when taking methotrexate. Persistent nausea, loss of appetite, pale stools, dark urine, cough, and shortness of breath should be reported immediately. Patient advised to discontinue methotrexate treatment at least three months before attempting to become pregnant.  I discussed the need for folate supplements while taking methotrexate.  These supplements can decrease side effects during methotrexate treatment. The patient verbalized understanding of the proper use and possible adverse effects of methotrexate.  All of the patient's questions and concerns were addressed.
Itraconazole Counseling:  I discussed with the patient the risks of itraconazole including but not limited to liver damage, nausea/vomiting, neuropathy, and severe allergy.  The patient understands that this medication is best absorbed when taken with acidic beverages such as non-diet cola or ginger ale.  The patient understands that monitoring is required including baseline LFTs and repeat LFTs at intervals.  The patient understands that they are to contact us or the primary physician if concerning signs are noted.
High Dose Vitamin A Pregnancy And Lactation Text: High dose vitamin A therapy is contraindicated during pregnancy and breast feeding.
Clofazimine Counseling:  I discussed with the patient the risks of clofazimine including but not limited to skin and eye pigmentation, liver damage, nausea/vomiting, gastrointestinal bleeding and allergy.
Nsaids Counseling: NSAID Counseling: I discussed with the patient that NSAIDs should be taken with food. Prolonged use of NSAIDs can result in the development of stomach ulcers.  Patient advised to stop taking NSAIDs if abdominal pain occurs.  The patient verbalized understanding of the proper use and possible adverse effects of NSAIDs.  All of the patient's questions and concerns were addressed.

## 2020-08-06 NOTE — PROCEDURE: COUNSELING
Detail Level: Detailed
Topical Clindamycin Pregnancy And Lactation Text: This medication is Pregnancy Category B and is considered safe during pregnancy. It is unknown if it is excreted in breast milk.
Isotretinoin Pregnancy And Lactation Text: This medication is Pregnancy Category X and is considered extremely dangerous during pregnancy. It is unknown if it is excreted in breast milk.
Minocycline Pregnancy And Lactation Text: This medication is Pregnancy Category D and not consider safe during pregnancy. It is also excreted in breast milk.
Tetracycline Counseling: Patient counseled regarding possible photosensitivity and increased risk for sunburn.  Patient instructed to avoid sunlight, if possible.  When exposed to sunlight, patients should wear protective clothing, sunglasses, and sunscreen.  The patient was instructed to call the office immediately if the following severe adverse effects occur:  hearing changes, easy bruising/bleeding, severe headache, or vision changes.  The patient verbalized understanding of the proper use and possible adverse effects of tetracycline.  All of the patient's questions and concerns were addressed. Patient understands to avoid pregnancy while on therapy due to potential birth defects.
Azithromycin Counseling:  I discussed with the patient the risks of azithromycin including but not limited to GI upset, allergic reaction, drug rash, diarrhea, and yeast infections.
Topical Retinoid Pregnancy And Lactation Text: This medication is Pregnancy Category C. It is unknown if this medication is excreted in breast milk.
Include Pregnancy/Lactation Warning?: No
Sarecycline Counseling: Patient advised regarding possible photosensitivity and discoloration of the teeth, skin, lips, tongue and gums.  Patient instructed to avoid sunlight, if possible.  When exposed to sunlight, patients should wear protective clothing, sunglasses, and sunscreen.  The patient was instructed to call the office immediately if the following severe adverse effects occur:  hearing changes, easy bruising/bleeding, severe headache, or vision changes.  The patient verbalized understanding of the proper use and possible adverse effects of sarecycline.  All of the patient's questions and concerns were addressed.
Birth Control Pills Counseling: Birth Control Pill Counseling: I discussed with the patient the potential side effects of OCPs including but not limited to increased risk of stroke, heart attack, thrombophlebitis, deep venous thrombosis, hepatic adenomas, breast changes, GI upset, headaches, and depression.  The patient verbalized understanding of the proper use and possible adverse effects of OCPs. All of the patient's questions and concerns were addressed.
Detail Level: Zone
Birth Control Pills Pregnancy And Lactation Text: This medication should be avoided if pregnant and for the first 30 days post-partum.
Topical Sulfur Applications Counseling: Topical Sulfur Counseling: Patient counseled that this medication may cause skin irritation or allergic reactions.  In the event of skin irritation, the patient was advised to reduce the amount of the drug applied or use it less frequently.   The patient verbalized understanding of the proper use and possible adverse effects of topical sulfur application.  All of the patient's questions and concerns were addressed.
Doxycycline Pregnancy And Lactation Text: This medication is Pregnancy Category D and not consider safe during pregnancy. It is also excreted in breast milk but is considered safe for shorter treatment courses.
High Dose Vitamin A Counseling: Side effects reviewed, pt to contact office should one occur.
Erythromycin Counseling:  I discussed with the patient the risks of erythromycin including but not limited to GI upset, allergic reaction, drug rash, diarrhea, increase in liver enzymes, and yeast infections.
Tazorac Counseling:  Patient advised that medication is irritating and drying.  Patient may need to apply sparingly and wash off after an hour before eventually leaving it on overnight.  The patient verbalized understanding of the proper use and possible adverse effects of tazorac.  All of the patient's questions and concerns were addressed.
Azithromycin Pregnancy And Lactation Text: This medication is considered safe during pregnancy and is also secreted in breast milk.
Benzoyl Peroxide Counseling: Patient counseled that medicine may cause skin irritation and bleach clothing.  In the event of skin irritation, the patient was advised to reduce the amount of the drug applied or use it less frequently.   The patient verbalized understanding of the proper use and possible adverse effects of benzoyl peroxide.  All of the patient's questions and concerns were addressed.
Tazorac Pregnancy And Lactation Text: This medication is not safe during pregnancy. It is unknown if this medication is excreted in breast milk.
Topical Sulfur Applications Pregnancy And Lactation Text: This medication is Pregnancy Category C and has an unknown safety profile during pregnancy. It is unknown if this topical medication is excreted in breast milk.
High Dose Vitamin A Pregnancy And Lactation Text: High dose vitamin A therapy is contraindicated during pregnancy and breast feeding.
Dapsone Counseling: I discussed with the patient the risks of dapsone including but not limited to hemolytic anemia, agranulocytosis, rashes, methemoglobinemia, kidney failure, peripheral neuropathy, headaches, GI upset, and liver toxicity.  Patients who start dapsone require monitoring including baseline LFTs and weekly CBCs for the first month, then every month thereafter.  The patient verbalized understanding of the proper use and possible adverse effects of dapsone.  All of the patient's questions and concerns were addressed.
Bactrim Counseling:  I discussed with the patient the risks of sulfa antibiotics including but not limited to GI upset, allergic reaction, drug rash, diarrhea, dizziness, photosensitivity, and yeast infections.  Rarely, more serious reactions can occur including but not limited to aplastic anemia, agranulocytosis, methemoglobinemia, blood dyscrasias, liver or kidney failure, lung infiltrates or desquamative/blistering drug rashes.
Spironolactone Counseling: Patient advised regarding risks of diarrhea, abdominal pain, hyperkalemia, birth defects (for female patients), liver toxicity and renal toxicity. The patient may need blood work to monitor liver and kidney function and potassium levels while on therapy. The patient verbalized understanding of the proper use and possible adverse effects of spironolactone.  All of the patient's questions and concerns were addressed.
Erythromycin Pregnancy And Lactation Text: This medication is Pregnancy Category B and is considered safe during pregnancy. It is also excreted in breast milk.
Minocycline Counseling: Patient advised regarding possible photosensitivity and discoloration of the teeth, skin, lips, tongue and gums.  Patient instructed to avoid sunlight, if possible.  When exposed to sunlight, patients should wear protective clothing, sunglasses, and sunscreen.  The patient was instructed to call the office immediately if the following severe adverse effects occur:  hearing changes, easy bruising/bleeding, severe headache, or vision changes.  The patient verbalized understanding of the proper use and possible adverse effects of minocycline.  All of the patient's questions and concerns were addressed.
Spironolactone Pregnancy And Lactation Text: This medication can cause feminization of the male fetus and should be avoided during pregnancy. The active metabolite is also found in breast milk.
Benzoyl Peroxide Pregnancy And Lactation Text: This medication is Pregnancy Category C. It is unknown if benzoyl peroxide is excreted in breast milk.
Dapsone Pregnancy And Lactation Text: This medication is Pregnancy Category C and is not considered safe during pregnancy or breast feeding.
Topical Clindamycin Counseling: Patient counseled that this medication may cause skin irritation or allergic reactions.  In the event of skin irritation, the patient was advised to reduce the amount of the drug applied or use it less frequently.   The patient verbalized understanding of the proper use and possible adverse effects of clindamycin.  All of the patient's questions and concerns were addressed.
Doxycycline Counseling:  Patient counseled regarding possible photosensitivity and increased risk for sunburn.  Patient instructed to avoid sunlight, if possible.  When exposed to sunlight, patients should wear protective clothing, sunglasses, and sunscreen.  The patient was instructed to call the office immediately if the following severe adverse effects occur:  hearing changes, easy bruising/bleeding, severe headache, or vision changes.  The patient verbalized understanding of the proper use and possible adverse effects of doxycycline.  All of the patient's questions and concerns were addressed.
Topical Retinoid counseling:  Patient advised to apply a pea-sized amount only at bedtime and wait 30 minutes after washing their face before applying.  If too drying, patient may add a non-comedogenic moisturizer. The patient verbalized understanding of the proper use and possible adverse effects of retinoids.  All of the patient's questions and concerns were addressed.
Isotretinoin Counseling: Patient should get monthly blood tests, not donate blood, not drive at night if vision affected, not share medication, and not undergo elective surgery for 6 months after tx completed. Side effects reviewed, pt to contact office should one occur.
Bactrim Pregnancy And Lactation Text: This medication is Pregnancy Category D and is known to cause fetal risk.  It is also excreted in breast milk.

## 2020-08-24 ENCOUNTER — RX ONLY (OUTPATIENT)
Age: 50
Setting detail: RX ONLY
End: 2020-08-24

## 2020-08-24 RX ORDER — MINOCYCLINE HYDROCHLORIDE 100 MG/1
CAPSULE ORAL
Qty: 90 | Refills: 3 | Status: ERX

## 2020-08-24 RX ORDER — SPIRONOLACTONE 100 MG/1
TABLET, FILM COATED ORAL
Qty: 90 | Refills: 3 | Status: ERX

## 2020-10-21 ENCOUNTER — APPOINTMENT (RX ONLY)
Dept: URBAN - METROPOLITAN AREA CLINIC 374 | Facility: CLINIC | Age: 50
Setting detail: DERMATOLOGY
End: 2020-10-21

## 2020-10-21 DIAGNOSIS — L90.5 SCAR CONDITIONS AND FIBROSIS OF SKIN: ICD-10-CM

## 2020-10-21 DIAGNOSIS — L70.8 OTHER ACNE: ICD-10-CM | Status: IMPROVED

## 2020-10-21 DIAGNOSIS — Z79.899 OTHER LONG TERM (CURRENT) DRUG THERAPY: ICD-10-CM

## 2020-10-21 DIAGNOSIS — L65.9 NONSCARRING HAIR LOSS, UNSPECIFIED: ICD-10-CM

## 2020-10-21 PROCEDURE — ? IN-HOUSE DISPENSING PHARMACY

## 2020-10-21 PROCEDURE — ? COUNSELING

## 2020-10-21 PROCEDURE — ? RECOMMENDATIONS

## 2020-10-21 PROCEDURE — ? PRESCRIPTION

## 2020-10-21 PROCEDURE — ? PRESCRIPTION MEDICATION MANAGEMENT

## 2020-10-21 PROCEDURE — ? HIGH RISK MEDICATION MONITORING

## 2020-10-21 PROCEDURE — 99213 OFFICE O/P EST LOW 20 MIN: CPT

## 2020-10-21 RX ORDER — TRIAMCINOLONE ACETONIDE 1 MG/G
CREAM TOPICAL BID
Qty: 1 | Refills: 3 | Status: ERX | COMMUNITY
Start: 2020-10-21

## 2020-10-21 RX ADMIN — TRIAMCINOLONE ACETONIDE: 1 CREAM TOPICAL at 00:00

## 2020-10-21 ASSESSMENT — LOCATION SIMPLE DESCRIPTION DERM
LOCATION SIMPLE: LEFT CHEEK
LOCATION SIMPLE: LEFT PRETIBIAL REGION
LOCATION SIMPLE: ANTERIOR SCALP

## 2020-10-21 ASSESSMENT — LOCATION ZONE DERM
LOCATION ZONE: FACE
LOCATION ZONE: LEG
LOCATION ZONE: SCALP

## 2020-10-21 ASSESSMENT — LOCATION DETAILED DESCRIPTION DERM
LOCATION DETAILED: MID-FRONTAL SCALP
LOCATION DETAILED: LEFT PROXIMAL PRETIBIAL REGION
LOCATION DETAILED: LEFT INFERIOR CENTRAL MALAR CHEEK

## 2020-10-21 ASSESSMENT — SEVERITY ASSESSMENT OVERALL AMONG ALL PATIENTS
IN YOUR EXPERIENCE, AMONG ALL PATIENTS YOU HAVE SEEN WITH THIS CONDITION, HOW SEVERE IS THIS PATIENT'S CONDITION?: FEW INFLAMMATORY LESIONS, SOME NONINFLAMMATORY

## 2020-10-21 NOTE — PROCEDURE: COUNSELING
Topical Clindamycin Pregnancy And Lactation Text: This medication is Pregnancy Category B and is considered safe during pregnancy. It is unknown if it is excreted in breast milk.
Isotretinoin Pregnancy And Lactation Text: This medication is Pregnancy Category X and is considered extremely dangerous during pregnancy. It is unknown if it is excreted in breast milk.
Minocycline Pregnancy And Lactation Text: This medication is Pregnancy Category D and not consider safe during pregnancy. It is also excreted in breast milk.
Tetracycline Counseling: Patient counseled regarding possible photosensitivity and increased risk for sunburn.  Patient instructed to avoid sunlight, if possible.  When exposed to sunlight, patients should wear protective clothing, sunglasses, and sunscreen.  The patient was instructed to call the office immediately if the following severe adverse effects occur:  hearing changes, easy bruising/bleeding, severe headache, or vision changes.  The patient verbalized understanding of the proper use and possible adverse effects of tetracycline.  All of the patient's questions and concerns were addressed. Patient understands to avoid pregnancy while on therapy due to potential birth defects.
Azithromycin Counseling:  I discussed with the patient the risks of azithromycin including but not limited to GI upset, allergic reaction, drug rash, diarrhea, and yeast infections.
Topical Retinoid Pregnancy And Lactation Text: This medication is Pregnancy Category C. It is unknown if this medication is excreted in breast milk.
Include Pregnancy/Lactation Warning?: No
Sarecycline Counseling: Patient advised regarding possible photosensitivity and discoloration of the teeth, skin, lips, tongue and gums.  Patient instructed to avoid sunlight, if possible.  When exposed to sunlight, patients should wear protective clothing, sunglasses, and sunscreen.  The patient was instructed to call the office immediately if the following severe adverse effects occur:  hearing changes, easy bruising/bleeding, severe headache, or vision changes.  The patient verbalized understanding of the proper use and possible adverse effects of sarecycline.  All of the patient's questions and concerns were addressed.
Birth Control Pills Counseling: Birth Control Pill Counseling: I discussed with the patient the potential side effects of OCPs including but not limited to increased risk of stroke, heart attack, thrombophlebitis, deep venous thrombosis, hepatic adenomas, breast changes, GI upset, headaches, and depression.  The patient verbalized understanding of the proper use and possible adverse effects of OCPs. All of the patient's questions and concerns were addressed.
Detail Level: Zone
Birth Control Pills Pregnancy And Lactation Text: This medication should be avoided if pregnant and for the first 30 days post-partum.
Topical Sulfur Applications Counseling: Topical Sulfur Counseling: Patient counseled that this medication may cause skin irritation or allergic reactions.  In the event of skin irritation, the patient was advised to reduce the amount of the drug applied or use it less frequently.   The patient verbalized understanding of the proper use and possible adverse effects of topical sulfur application.  All of the patient's questions and concerns were addressed.
Doxycycline Pregnancy And Lactation Text: This medication is Pregnancy Category D and not consider safe during pregnancy. It is also excreted in breast milk but is considered safe for shorter treatment courses.
High Dose Vitamin A Counseling: Side effects reviewed, pt to contact office should one occur.
Erythromycin Counseling:  I discussed with the patient the risks of erythromycin including but not limited to GI upset, allergic reaction, drug rash, diarrhea, increase in liver enzymes, and yeast infections.
Tazorac Counseling:  Patient advised that medication is irritating and drying.  Patient may need to apply sparingly and wash off after an hour before eventually leaving it on overnight.  The patient verbalized understanding of the proper use and possible adverse effects of tazorac.  All of the patient's questions and concerns were addressed.
Azithromycin Pregnancy And Lactation Text: This medication is considered safe during pregnancy and is also secreted in breast milk.
Benzoyl Peroxide Counseling: Patient counseled that medicine may cause skin irritation and bleach clothing.  In the event of skin irritation, the patient was advised to reduce the amount of the drug applied or use it less frequently.   The patient verbalized understanding of the proper use and possible adverse effects of benzoyl peroxide.  All of the patient's questions and concerns were addressed.
Tazorac Pregnancy And Lactation Text: This medication is not safe during pregnancy. It is unknown if this medication is excreted in breast milk.
Topical Sulfur Applications Pregnancy And Lactation Text: This medication is Pregnancy Category C and has an unknown safety profile during pregnancy. It is unknown if this topical medication is excreted in breast milk.
High Dose Vitamin A Pregnancy And Lactation Text: High dose vitamin A therapy is contraindicated during pregnancy and breast feeding.
Dapsone Counseling: I discussed with the patient the risks of dapsone including but not limited to hemolytic anemia, agranulocytosis, rashes, methemoglobinemia, kidney failure, peripheral neuropathy, headaches, GI upset, and liver toxicity.  Patients who start dapsone require monitoring including baseline LFTs and weekly CBCs for the first month, then every month thereafter.  The patient verbalized understanding of the proper use and possible adverse effects of dapsone.  All of the patient's questions and concerns were addressed.
Bactrim Counseling:  I discussed with the patient the risks of sulfa antibiotics including but not limited to GI upset, allergic reaction, drug rash, diarrhea, dizziness, photosensitivity, and yeast infections.  Rarely, more serious reactions can occur including but not limited to aplastic anemia, agranulocytosis, methemoglobinemia, blood dyscrasias, liver or kidney failure, lung infiltrates or desquamative/blistering drug rashes.
Spironolactone Counseling: Patient advised regarding risks of diarrhea, abdominal pain, hyperkalemia, birth defects (for female patients), liver toxicity and renal toxicity. The patient may need blood work to monitor liver and kidney function and potassium levels while on therapy. The patient verbalized understanding of the proper use and possible adverse effects of spironolactone.  All of the patient's questions and concerns were addressed.
Erythromycin Pregnancy And Lactation Text: This medication is Pregnancy Category B and is considered safe during pregnancy. It is also excreted in breast milk.
Minocycline Counseling: Patient advised regarding possible photosensitivity and discoloration of the teeth, skin, lips, tongue and gums.  Patient instructed to avoid sunlight, if possible.  When exposed to sunlight, patients should wear protective clothing, sunglasses, and sunscreen.  The patient was instructed to call the office immediately if the following severe adverse effects occur:  hearing changes, easy bruising/bleeding, severe headache, or vision changes.  The patient verbalized understanding of the proper use and possible adverse effects of minocycline.  All of the patient's questions and concerns were addressed.
Spironolactone Pregnancy And Lactation Text: This medication can cause feminization of the male fetus and should be avoided during pregnancy. The active metabolite is also found in breast milk.
Benzoyl Peroxide Pregnancy And Lactation Text: This medication is Pregnancy Category C. It is unknown if benzoyl peroxide is excreted in breast milk.
Dapsone Pregnancy And Lactation Text: This medication is Pregnancy Category C and is not considered safe during pregnancy or breast feeding.
Topical Clindamycin Counseling: Patient counseled that this medication may cause skin irritation or allergic reactions.  In the event of skin irritation, the patient was advised to reduce the amount of the drug applied or use it less frequently.   The patient verbalized understanding of the proper use and possible adverse effects of clindamycin.  All of the patient's questions and concerns were addressed.
Doxycycline Counseling:  Patient counseled regarding possible photosensitivity and increased risk for sunburn.  Patient instructed to avoid sunlight, if possible.  When exposed to sunlight, patients should wear protective clothing, sunglasses, and sunscreen.  The patient was instructed to call the office immediately if the following severe adverse effects occur:  hearing changes, easy bruising/bleeding, severe headache, or vision changes.  The patient verbalized understanding of the proper use and possible adverse effects of doxycycline.  All of the patient's questions and concerns were addressed.
Topical Retinoid counseling:  Patient advised to apply a pea-sized amount only at bedtime and wait 30 minutes after washing their face before applying.  If too drying, patient may add a non-comedogenic moisturizer. The patient verbalized understanding of the proper use and possible adverse effects of retinoids.  All of the patient's questions and concerns were addressed.
Isotretinoin Counseling: Patient should get monthly blood tests, not donate blood, not drive at night if vision affected, not share medication, and not undergo elective surgery for 6 months after tx completed. Side effects reviewed, pt to contact office should one occur.
Bactrim Pregnancy And Lactation Text: This medication is Pregnancy Category D and is known to cause fetal risk.  It is also excreted in breast milk.
Detail Level: Detailed

## 2020-10-21 NOTE — PROCEDURE: IN-HOUSE DISPENSING PHARMACY
Product 55 Units Dispensed: 0
Product 1 Amount/Unit (Numbers Only): 30
Product 70 Unit Type: mg
Send Charges To Patient Encounter: Yes
Product 1 Price/Unit (In Dollars): 29.99
Detail Level: Zone
Product 1 Unit Type: grams
Name Of Product 1: Biocornium
Product 1 Units Dispensed: 1
Product 1 Application Directions: Apply BID scars

## 2020-10-21 NOTE — PROCEDURE: PRESCRIPTION MEDICATION MANAGEMENT
Render In Strict Bullet Format?: No
Detail Level: Zone
Discontinue Regimen: sulfur wash
Continue Regimen: Spironolactone 100mg- take one tab po QDAY\\nBenzoyl Peroxide 10%  Cleanser - wash face QDAY
Otc Regimen: Rogaine 5% foam for women

## 2020-10-21 NOTE — HPI: HAIR LOSS
Previous Labs: Yes
How Did The Hair Loss Occur?: gradual in onset
How Severe Is Your Hair Loss?: mild
When Were The Labs Drawn? (Drawn...): WNL

## 2020-10-21 NOTE — PROCEDURE: RECOMMENDATIONS
Recommendation Preamble: The following recommendations were made during the visit:
Recommendations (Free Text): Patient to buy scar gel in office
Detail Level: Zone

## 2021-02-10 ENCOUNTER — RX ONLY (OUTPATIENT)
Age: 51
Setting detail: RX ONLY
End: 2021-02-10

## 2021-02-10 ENCOUNTER — APPOINTMENT (RX ONLY)
Dept: URBAN - METROPOLITAN AREA CLINIC 374 | Facility: CLINIC | Age: 51
Setting detail: DERMATOLOGY
End: 2021-02-10

## 2021-02-10 DIAGNOSIS — L90.5 SCAR CONDITIONS AND FIBROSIS OF SKIN: ICD-10-CM | Status: RESOLVING

## 2021-02-10 DIAGNOSIS — Z79.899 OTHER LONG TERM (CURRENT) DRUG THERAPY: ICD-10-CM

## 2021-02-10 DIAGNOSIS — L73.9 FOLLICULAR DISORDER, UNSPECIFIED: ICD-10-CM | Status: RESOLVED

## 2021-02-10 DIAGNOSIS — L663 OTHER SPECIFIED DISEASES OF HAIR AND HAIR FOLLICLES: ICD-10-CM | Status: RESOLVED

## 2021-02-10 DIAGNOSIS — L65.9 NONSCARRING HAIR LOSS, UNSPECIFIED: ICD-10-CM | Status: IMPROVED

## 2021-02-10 DIAGNOSIS — L70.8 OTHER ACNE: ICD-10-CM | Status: WELL CONTROLLED

## 2021-02-10 DIAGNOSIS — L738 OTHER SPECIFIED DISEASES OF HAIR AND HAIR FOLLICLES: ICD-10-CM | Status: RESOLVED

## 2021-02-10 PROBLEM — L02.821 FURUNCLE OF HEAD [ANY PART, EXCEPT FACE]: Status: ACTIVE | Noted: 2021-02-10

## 2021-02-10 PROCEDURE — ? PRESCRIPTION MEDICATION MANAGEMENT

## 2021-02-10 PROCEDURE — 99213 OFFICE O/P EST LOW 20 MIN: CPT

## 2021-02-10 PROCEDURE — ? COUNSELING

## 2021-02-10 PROCEDURE — ? PHOTO-DOCUMENTATION

## 2021-02-10 PROCEDURE — ? HIGH RISK MEDICATION MONITORING

## 2021-02-10 RX ORDER — TRIAMCINOLONE ACETONIDE 1 MG/G
CREAM TOPICAL BID
Qty: 1 | Refills: 3 | Status: ERX

## 2021-02-10 ASSESSMENT — LOCATION ZONE DERM
LOCATION ZONE: FACE
LOCATION ZONE: SCALP
LOCATION ZONE: LEG

## 2021-02-10 ASSESSMENT — LOCATION DETAILED DESCRIPTION DERM
LOCATION DETAILED: MID-OCCIPITAL SCALP
LOCATION DETAILED: MID-FRONTAL SCALP
LOCATION DETAILED: LEFT INFERIOR CENTRAL MALAR CHEEK
LOCATION DETAILED: LEFT PROXIMAL PRETIBIAL REGION

## 2021-02-10 ASSESSMENT — SEVERITY ASSESSMENT OVERALL AMONG ALL PATIENTS
IN YOUR EXPERIENCE, AMONG ALL PATIENTS YOU HAVE SEEN WITH THIS CONDITION, HOW SEVERE IS THIS PATIENT'S CONDITION?: NORMAL

## 2021-02-10 ASSESSMENT — LOCATION SIMPLE DESCRIPTION DERM
LOCATION SIMPLE: ANTERIOR SCALP
LOCATION SIMPLE: LEFT CHEEK
LOCATION SIMPLE: LEFT PRETIBIAL REGION
LOCATION SIMPLE: POSTERIOR SCALP

## 2021-02-10 ASSESSMENT — SEVERITY ASSESSMENT: SEVERITY: CLEAR

## 2021-02-10 ASSESSMENT — PAIN INTENSITY VAS: HOW INTENSE IS YOUR PAIN 0 BEING NO PAIN, 10 BEING THE MOST SEVERE PAIN POSSIBLE?: NO PAIN

## 2021-02-10 NOTE — PROCEDURE: COUNSELING
Detail Level: Detailed
Detail Level: Zone
Topical Clindamycin Pregnancy And Lactation Text: This medication is Pregnancy Category B and is considered safe during pregnancy. It is unknown if it is excreted in breast milk.
Isotretinoin Pregnancy And Lactation Text: This medication is Pregnancy Category X and is considered extremely dangerous during pregnancy. It is unknown if it is excreted in breast milk.
Minocycline Pregnancy And Lactation Text: This medication is Pregnancy Category D and not consider safe during pregnancy. It is also excreted in breast milk.
Tetracycline Counseling: Patient counseled regarding possible photosensitivity and increased risk for sunburn.  Patient instructed to avoid sunlight, if possible.  When exposed to sunlight, patients should wear protective clothing, sunglasses, and sunscreen.  The patient was instructed to call the office immediately if the following severe adverse effects occur:  hearing changes, easy bruising/bleeding, severe headache, or vision changes.  The patient verbalized understanding of the proper use and possible adverse effects of tetracycline.  All of the patient's questions and concerns were addressed. Patient understands to avoid pregnancy while on therapy due to potential birth defects.
Azithromycin Counseling:  I discussed with the patient the risks of azithromycin including but not limited to GI upset, allergic reaction, drug rash, diarrhea, and yeast infections.
Topical Retinoid Pregnancy And Lactation Text: This medication is Pregnancy Category C. It is unknown if this medication is excreted in breast milk.
Include Pregnancy/Lactation Warning?: No
Sarecycline Counseling: Patient advised regarding possible photosensitivity and discoloration of the teeth, skin, lips, tongue and gums.  Patient instructed to avoid sunlight, if possible.  When exposed to sunlight, patients should wear protective clothing, sunglasses, and sunscreen.  The patient was instructed to call the office immediately if the following severe adverse effects occur:  hearing changes, easy bruising/bleeding, severe headache, or vision changes.  The patient verbalized understanding of the proper use and possible adverse effects of sarecycline.  All of the patient's questions and concerns were addressed.
Birth Control Pills Counseling: Birth Control Pill Counseling: I discussed with the patient the potential side effects of OCPs including but not limited to increased risk of stroke, heart attack, thrombophlebitis, deep venous thrombosis, hepatic adenomas, breast changes, GI upset, headaches, and depression.  The patient verbalized understanding of the proper use and possible adverse effects of OCPs. All of the patient's questions and concerns were addressed.
Birth Control Pills Pregnancy And Lactation Text: This medication should be avoided if pregnant and for the first 30 days post-partum.
Topical Sulfur Applications Counseling: Topical Sulfur Counseling: Patient counseled that this medication may cause skin irritation or allergic reactions.  In the event of skin irritation, the patient was advised to reduce the amount of the drug applied or use it less frequently.   The patient verbalized understanding of the proper use and possible adverse effects of topical sulfur application.  All of the patient's questions and concerns were addressed.
Doxycycline Pregnancy And Lactation Text: This medication is Pregnancy Category D and not consider safe during pregnancy. It is also excreted in breast milk but is considered safe for shorter treatment courses.
High Dose Vitamin A Counseling: Side effects reviewed, pt to contact office should one occur.
Erythromycin Counseling:  I discussed with the patient the risks of erythromycin including but not limited to GI upset, allergic reaction, drug rash, diarrhea, increase in liver enzymes, and yeast infections.
Tazorac Counseling:  Patient advised that medication is irritating and drying.  Patient may need to apply sparingly and wash off after an hour before eventually leaving it on overnight.  The patient verbalized understanding of the proper use and possible adverse effects of tazorac.  All of the patient's questions and concerns were addressed.
Azithromycin Pregnancy And Lactation Text: This medication is considered safe during pregnancy and is also secreted in breast milk.
Benzoyl Peroxide Counseling: Patient counseled that medicine may cause skin irritation and bleach clothing.  In the event of skin irritation, the patient was advised to reduce the amount of the drug applied or use it less frequently.   The patient verbalized understanding of the proper use and possible adverse effects of benzoyl peroxide.  All of the patient's questions and concerns were addressed.
Tazorac Pregnancy And Lactation Text: This medication is not safe during pregnancy. It is unknown if this medication is excreted in breast milk.
Topical Sulfur Applications Pregnancy And Lactation Text: This medication is Pregnancy Category C and has an unknown safety profile during pregnancy. It is unknown if this topical medication is excreted in breast milk.
High Dose Vitamin A Pregnancy And Lactation Text: High dose vitamin A therapy is contraindicated during pregnancy and breast feeding.
Dapsone Counseling: I discussed with the patient the risks of dapsone including but not limited to hemolytic anemia, agranulocytosis, rashes, methemoglobinemia, kidney failure, peripheral neuropathy, headaches, GI upset, and liver toxicity.  Patients who start dapsone require monitoring including baseline LFTs and weekly CBCs for the first month, then every month thereafter.  The patient verbalized understanding of the proper use and possible adverse effects of dapsone.  All of the patient's questions and concerns were addressed.
Bactrim Counseling:  I discussed with the patient the risks of sulfa antibiotics including but not limited to GI upset, allergic reaction, drug rash, diarrhea, dizziness, photosensitivity, and yeast infections.  Rarely, more serious reactions can occur including but not limited to aplastic anemia, agranulocytosis, methemoglobinemia, blood dyscrasias, liver or kidney failure, lung infiltrates or desquamative/blistering drug rashes.
Spironolactone Counseling: Patient advised regarding risks of diarrhea, abdominal pain, hyperkalemia, birth defects (for female patients), liver toxicity and renal toxicity. The patient may need blood work to monitor liver and kidney function and potassium levels while on therapy. The patient verbalized understanding of the proper use and possible adverse effects of spironolactone.  All of the patient's questions and concerns were addressed.
Erythromycin Pregnancy And Lactation Text: This medication is Pregnancy Category B and is considered safe during pregnancy. It is also excreted in breast milk.
Minocycline Counseling: Patient advised regarding possible photosensitivity and discoloration of the teeth, skin, lips, tongue and gums.  Patient instructed to avoid sunlight, if possible.  When exposed to sunlight, patients should wear protective clothing, sunglasses, and sunscreen.  The patient was instructed to call the office immediately if the following severe adverse effects occur:  hearing changes, easy bruising/bleeding, severe headache, or vision changes.  The patient verbalized understanding of the proper use and possible adverse effects of minocycline.  All of the patient's questions and concerns were addressed.
Spironolactone Pregnancy And Lactation Text: This medication can cause feminization of the male fetus and should be avoided during pregnancy. The active metabolite is also found in breast milk.
Benzoyl Peroxide Pregnancy And Lactation Text: This medication is Pregnancy Category C. It is unknown if benzoyl peroxide is excreted in breast milk.
Dapsone Pregnancy And Lactation Text: This medication is Pregnancy Category C and is not considered safe during pregnancy or breast feeding.
Topical Clindamycin Counseling: Patient counseled that this medication may cause skin irritation or allergic reactions.  In the event of skin irritation, the patient was advised to reduce the amount of the drug applied or use it less frequently.   The patient verbalized understanding of the proper use and possible adverse effects of clindamycin.  All of the patient's questions and concerns were addressed.
Doxycycline Counseling:  Patient counseled regarding possible photosensitivity and increased risk for sunburn.  Patient instructed to avoid sunlight, if possible.  When exposed to sunlight, patients should wear protective clothing, sunglasses, and sunscreen.  The patient was instructed to call the office immediately if the following severe adverse effects occur:  hearing changes, easy bruising/bleeding, severe headache, or vision changes.  The patient verbalized understanding of the proper use and possible adverse effects of doxycycline.  All of the patient's questions and concerns were addressed.
Topical Retinoid counseling:  Patient advised to apply a pea-sized amount only at bedtime and wait 30 minutes after washing their face before applying.  If too drying, patient may add a non-comedogenic moisturizer. The patient verbalized understanding of the proper use and possible adverse effects of retinoids.  All of the patient's questions and concerns were addressed.
Isotretinoin Counseling: Patient should get monthly blood tests, not donate blood, not drive at night if vision affected, not share medication, and not undergo elective surgery for 6 months after tx completed. Side effects reviewed, pt to contact office should one occur.
Bactrim Pregnancy And Lactation Text: This medication is Pregnancy Category D and is known to cause fetal risk.  It is also excreted in breast milk.

## 2021-02-10 NOTE — PROCEDURE: PRESCRIPTION MEDICATION MANAGEMENT
Otc Regimen: Rogaine 5% foam for women
Detail Level: Zone
Continue Regimen: OTC biotin 10,000 mg
Render In Strict Bullet Format?: No
Discontinue Regimen: Cordran Lotion\\nMinocycline 100mg- take one capsule po QDAY with food\\nTovet Foam- apply a thin layer QDAY to AA of scalp and neck
Continue Regimen: TAC 0.1% cream QDAY
Discontinue Regimen: sulfur wash
Continue Regimen: Spironolactone 100mg- take one tab po QDAY\\nBenzoyl Peroxide 10%  Cleanser - wash face QDAY

## 2021-06-17 ENCOUNTER — APPOINTMENT (RX ONLY)
Dept: URBAN - METROPOLITAN AREA CLINIC 28 | Facility: CLINIC | Age: 51
Setting detail: DERMATOLOGY
End: 2021-06-17

## 2021-06-17 DIAGNOSIS — L29.8 OTHER PRURITUS: ICD-10-CM

## 2021-06-17 DIAGNOSIS — L29.89 OTHER PRURITUS: ICD-10-CM

## 2021-06-17 PROCEDURE — 99213 OFFICE O/P EST LOW 20 MIN: CPT

## 2021-06-17 PROCEDURE — ? PRESCRIPTION MEDICATION MANAGEMENT

## 2021-06-17 PROCEDURE — ? PRESCRIPTION

## 2021-06-17 PROCEDURE — ? COUNSELING

## 2021-06-17 PROCEDURE — ? MEDICATION COUNSELING

## 2021-06-17 RX ORDER — SALICYLIC ACID 3 G/100G
LOTION TOPICAL
Qty: 30 | Refills: 3 | Status: ERX | COMMUNITY
Start: 2021-06-17

## 2021-06-17 RX ORDER — CLOBETASOL PROPIONATE 0.5 MG/ML
SOLUTION TOPICAL
Qty: 1 | Refills: 2 | Status: ERX | COMMUNITY
Start: 2021-06-17

## 2021-06-17 RX ADMIN — CLOBETASOL PROPIONATE: 0.5 SOLUTION TOPICAL at 00:00

## 2021-06-17 RX ADMIN — SALICYLIC ACID: 3 LOTION TOPICAL at 00:00

## 2021-06-17 ASSESSMENT — LOCATION SIMPLE DESCRIPTION DERM
LOCATION SIMPLE: RIGHT PRETIBIAL REGION
LOCATION SIMPLE: POSTERIOR SCALP
LOCATION SIMPLE: LEFT PRETIBIAL REGION
LOCATION SIMPLE: LEFT SHOULDER
LOCATION SIMPLE: RIGHT SHOULDER

## 2021-06-17 ASSESSMENT — LOCATION ZONE DERM
LOCATION ZONE: SCALP
LOCATION ZONE: ARM
LOCATION ZONE: LEG

## 2021-06-17 ASSESSMENT — LOCATION DETAILED DESCRIPTION DERM
LOCATION DETAILED: LEFT POSTERIOR SHOULDER
LOCATION DETAILED: POSTERIOR MID-PARIETAL SCALP
LOCATION DETAILED: LEFT PROXIMAL PRETIBIAL REGION
LOCATION DETAILED: RIGHT DISTAL PRETIBIAL REGION
LOCATION DETAILED: RIGHT POSTERIOR SHOULDER

## 2021-06-17 NOTE — PROCEDURE: PRESCRIPTION MEDICATION MANAGEMENT
Continue Regimen: Singular: take one tab PO QDAY\\nTriamcinolone 0.1% cream: apply a thin layer to AA of body BID PRN itch
Initiate Treatment: Allegra Allergy 180 mg tablet: Take one tab PO QDAY\\nclobetasol 0.05 % scalp solution: Apply a thin layer to AA of scalp QHS
Detail Level: Zone
Render In Strict Bullet Format?: No

## 2021-06-17 NOTE — PROCEDURE: MEDICATION COUNSELING
electronic Rhofade Counseling: Rhofade is a topical medication which can decrease superficial blood flow where applied. Side effects are uncommon and include stinging, redness and allergic reactions.

## 2021-06-17 NOTE — PROCEDURE: MEDICATION COUNSELING
Yes Itraconazole Counseling:  I discussed with the patient the risks of itraconazole including but not limited to liver damage, nausea/vomiting, neuropathy, and severe allergy.  The patient understands that this medication is best absorbed when taken with acidic beverages such as non-diet cola or ginger ale.  The patient understands that monitoring is required including baseline LFTs and repeat LFTs at intervals.  The patient understands that they are to contact us or the primary physician if concerning signs are noted.

## 2021-06-23 NOTE — PROCEDURE: MEDICATION COUNSELING
Erythromycin Pregnancy And Lactation Text: This medication is Pregnancy Category B and is considered safe during pregnancy. It is also excreted in breast milk. Erivedge Counseling- I discussed with the patient the risks of Erivedge including but not limited to nausea, vomiting, diarrhea, constipation, weight loss, changes in the sense of taste, decreased appetite, muscle spasms, and hair loss.  The patient verbalized understanding of the proper use and possible adverse effects of Erivedge.  All of the patient's questions and concerns were addressed.

## 2021-07-29 ENCOUNTER — APPOINTMENT (RX ONLY)
Dept: URBAN - METROPOLITAN AREA CLINIC 28 | Facility: CLINIC | Age: 51
Setting detail: DERMATOLOGY
End: 2021-07-29

## 2021-07-29 DIAGNOSIS — L29.8 OTHER PRURITUS: ICD-10-CM | Status: WELL CONTROLLED

## 2021-07-29 DIAGNOSIS — L20.89 OTHER ATOPIC DERMATITIS: ICD-10-CM | Status: IMPROVED

## 2021-07-29 DIAGNOSIS — L29.89 OTHER PRURITUS: ICD-10-CM | Status: WELL CONTROLLED

## 2021-07-29 PROCEDURE — ? PRESCRIPTION

## 2021-07-29 PROCEDURE — ? PRESCRIPTION MEDICATION MANAGEMENT

## 2021-07-29 PROCEDURE — ? MEDICATION COUNSELING

## 2021-07-29 PROCEDURE — 99213 OFFICE O/P EST LOW 20 MIN: CPT

## 2021-07-29 PROCEDURE — ? COUNSELING

## 2021-07-29 RX ORDER — TACROLIMUS 1 MG/G
OINTMENT TOPICAL BID
Qty: 1 | Refills: 3 | Status: CANCELLED | COMMUNITY
Start: 2021-07-29

## 2021-07-29 RX ADMIN — TACROLIMUS: 1 OINTMENT TOPICAL at 00:00

## 2021-07-29 ASSESSMENT — LOCATION ZONE DERM
LOCATION ZONE: FACE
LOCATION ZONE: LEG
LOCATION ZONE: SCALP
LOCATION ZONE: ARM

## 2021-07-29 ASSESSMENT — LOCATION DETAILED DESCRIPTION DERM
LOCATION DETAILED: LEFT POSTERIOR SHOULDER
LOCATION DETAILED: LEFT PROXIMAL PRETIBIAL REGION
LOCATION DETAILED: RIGHT LATERAL EYEBROW
LOCATION DETAILED: RIGHT DISTAL PRETIBIAL REGION
LOCATION DETAILED: POSTERIOR MID-PARIETAL SCALP
LOCATION DETAILED: LEFT LATERAL EYEBROW
LOCATION DETAILED: RIGHT POSTERIOR SHOULDER

## 2021-07-29 ASSESSMENT — LOCATION SIMPLE DESCRIPTION DERM
LOCATION SIMPLE: POSTERIOR SCALP
LOCATION SIMPLE: RIGHT EYEBROW
LOCATION SIMPLE: LEFT EYEBROW
LOCATION SIMPLE: RIGHT PRETIBIAL REGION
LOCATION SIMPLE: RIGHT SHOULDER
LOCATION SIMPLE: LEFT PRETIBIAL REGION
LOCATION SIMPLE: LEFT SHOULDER

## 2021-07-29 NOTE — PROCEDURE: PRESCRIPTION MEDICATION MANAGEMENT
Discontinue Regimen: clobetasol 0.05 % scalp solution\\nTriamcinolone 0.1% cream
Continue Regimen: Allegra Allergy 180 mg tablet: Take one tab PO QDAy\\nSingular: take one tab PO QDAY
Detail Level: Zone
Render In Strict Bullet Format?: No
Detail Level: Simple
Initiate Treatment: Protopic 0.1 % topical ointment: Apply a thin layer to eyelids BID

## 2021-08-05 ENCOUNTER — RX ONLY (OUTPATIENT)
Age: 51
Setting detail: RX ONLY
End: 2021-08-05

## 2021-08-05 RX ORDER — TACROLIMUS 1 MG/G
OINTMENT TOPICAL
Qty: 1 | Refills: 2 | Status: ERX | COMMUNITY
Start: 2021-08-05

## 2021-09-30 ENCOUNTER — TELEPHONE (OUTPATIENT)
Dept: INTERNAL MEDICINE | Facility: CLINIC | Age: 51
End: 2021-09-30

## 2021-09-30 ENCOUNTER — OFFICE VISIT (OUTPATIENT)
Dept: INTERNAL MEDICINE | Facility: CLINIC | Age: 51
End: 2021-09-30
Payer: COMMERCIAL

## 2021-09-30 VITALS
BODY MASS INDEX: 28.52 KG/M2 | HEART RATE: 78 BPM | HEIGHT: 62 IN | DIASTOLIC BLOOD PRESSURE: 68 MMHG | RESPIRATION RATE: 16 BRPM | TEMPERATURE: 98.1 F | WEIGHT: 155 LBS | OXYGEN SATURATION: 99 % | SYSTOLIC BLOOD PRESSURE: 116 MMHG

## 2021-09-30 DIAGNOSIS — L65.9 LOSS OF HAIR: ICD-10-CM

## 2021-09-30 DIAGNOSIS — F32.A DEPRESSION, UNSPECIFIED DEPRESSION TYPE: ICD-10-CM

## 2021-09-30 DIAGNOSIS — Z01.89 ROUTINE LAB DRAW: ICD-10-CM

## 2021-09-30 DIAGNOSIS — J30.9 ALLERGIC RHINITIS, UNSPECIFIED SEASONALITY, UNSPECIFIED TRIGGER: ICD-10-CM

## 2021-09-30 DIAGNOSIS — I10 ESSENTIAL HYPERTENSION, BENIGN: Primary | ICD-10-CM

## 2021-09-30 DIAGNOSIS — H69.93 DYSFUNCTION OF BOTH EUSTACHIAN TUBES: ICD-10-CM

## 2021-09-30 DIAGNOSIS — L70.9 ACNE, UNSPECIFIED ACNE TYPE: ICD-10-CM

## 2021-09-30 PROCEDURE — 3008F BODY MASS INDEX DOCD: CPT | Performed by: INTERNAL MEDICINE

## 2021-09-30 PROCEDURE — 90471 IMMUNIZATION ADMIN: CPT | Performed by: INTERNAL MEDICINE

## 2021-09-30 PROCEDURE — 99204 OFFICE O/P NEW MOD 45 MIN: CPT | Mod: 25 | Performed by: INTERNAL MEDICINE

## 2021-09-30 PROCEDURE — 90750 HZV VACC RECOMBINANT IM: CPT | Performed by: INTERNAL MEDICINE

## 2021-09-30 RX ORDER — AZELASTINE 1 MG/ML
1 SPRAY, METERED NASAL 2 TIMES DAILY
Qty: 30 ML | Refills: 3 | Status: SHIPPED | OUTPATIENT
Start: 2021-09-30 | End: 2021-10-12 | Stop reason: SDUPTHER

## 2021-09-30 RX ORDER — MONTELUKAST SODIUM 10 MG/1
10 TABLET ORAL
Qty: 90 TABLET | Refills: 3 | Status: SHIPPED | OUTPATIENT
Start: 2021-09-30 | End: 2022-02-09 | Stop reason: SDUPTHER

## 2021-09-30 RX ORDER — FLUTICASONE PROPIONATE 50 MCG
1 SPRAY, SUSPENSION (ML) NASAL 2 TIMES DAILY
Qty: 16 G | Refills: 3 | Status: SHIPPED | OUTPATIENT
Start: 2021-09-30 | End: 2021-10-12 | Stop reason: SDUPTHER

## 2021-09-30 RX ORDER — AZELASTINE HYDROCHLORIDE, FLUTICASONE PROPIONATE 137; 50 UG/1; UG/1
1 SPRAY, METERED NASAL 2 TIMES DAILY
Qty: 23 G | Refills: 6 | Status: SHIPPED | OUTPATIENT
Start: 2021-09-30 | End: 2021-09-30

## 2021-09-30 RX ORDER — CRISABOROLE 20 MG/G
OINTMENT TOPICAL
COMMUNITY

## 2021-09-30 RX ORDER — ATORVASTATIN CALCIUM 20 MG/1
20 TABLET, FILM COATED ORAL DAILY
Qty: 90 TABLET | Refills: 3 | Status: SHIPPED | OUTPATIENT
Start: 2021-09-30 | End: 2022-02-09 | Stop reason: SDUPTHER

## 2021-09-30 RX ORDER — ATORVASTATIN CALCIUM 20 MG/1
TABLET, FILM COATED ORAL
COMMUNITY
End: 2021-09-30 | Stop reason: SDUPTHER

## 2021-09-30 RX ORDER — HYDROCHLOROTHIAZIDE 25 MG/1
TABLET ORAL
COMMUNITY
Start: 2020-10-25 | End: 2021-09-30 | Stop reason: SDUPTHER

## 2021-09-30 RX ORDER — SPIRONOLACTONE 100 MG/1
TABLET, FILM COATED ORAL
COMMUNITY
End: 2021-09-30 | Stop reason: SDUPTHER

## 2021-09-30 RX ORDER — MINERAL OIL
180 ENEMA (ML) RECTAL DAILY
COMMUNITY
End: 2021-09-30 | Stop reason: SDUPTHER

## 2021-09-30 RX ORDER — MINERAL OIL
180 ENEMA (ML) RECTAL DAILY
Qty: 90 TABLET | Refills: 3 | Status: SHIPPED | OUTPATIENT
Start: 2021-09-30 | End: 2021-12-21

## 2021-09-30 RX ORDER — MONTELUKAST SODIUM 10 MG/1
10 TABLET ORAL
COMMUNITY
Start: 2021-08-25 | End: 2021-09-30 | Stop reason: SDUPTHER

## 2021-09-30 RX ORDER — SPIRONOLACTONE 100 MG/1
100 TABLET, FILM COATED ORAL DAILY
Qty: 90 TABLET | Refills: 3 | Status: SHIPPED | OUTPATIENT
Start: 2021-09-30 | End: 2022-02-09 | Stop reason: SDUPTHER

## 2021-09-30 RX ORDER — LOSARTAN POTASSIUM 50 MG/1
TABLET ORAL
COMMUNITY
Start: 2020-10-25 | End: 2021-09-30 | Stop reason: SDUPTHER

## 2021-09-30 RX ORDER — LOSARTAN POTASSIUM 50 MG/1
50 TABLET ORAL DAILY
Qty: 90 TABLET | Refills: 3 | Status: SHIPPED | OUTPATIENT
Start: 2021-09-30 | End: 2022-02-09 | Stop reason: SDUPTHER

## 2021-09-30 RX ORDER — HYDROCHLOROTHIAZIDE 25 MG/1
25 TABLET ORAL DAILY
Qty: 90 TABLET | Refills: 3 | Status: SHIPPED | OUTPATIENT
Start: 2021-09-30 | End: 2022-02-09 | Stop reason: SDUPTHER

## 2021-09-30 RX ORDER — BUPROPION HYDROCHLORIDE 300 MG/1
TABLET ORAL
COMMUNITY
Start: 2020-11-03 | End: 2021-09-30 | Stop reason: SDUPTHER

## 2021-09-30 RX ORDER — BUPROPION HYDROCHLORIDE 300 MG/1
300 TABLET ORAL DAILY
Qty: 90 TABLET | Refills: 3 | Status: SHIPPED | OUTPATIENT
Start: 2021-09-30 | End: 2022-02-09 | Stop reason: SDUPTHER

## 2021-09-30 RX ORDER — AZELASTINE HYDROCHLORIDE, FLUTICASONE PROPIONATE 137; 50 UG/1; UG/1
SPRAY, METERED NASAL
COMMUNITY
Start: 2020-11-13 | End: 2021-09-30 | Stop reason: SDUPTHER

## 2021-09-30 NOTE — PROGRESS NOTES
Subjective      Patient ID: Aurora Cedillo is a 51 y.o. female.    HPI    New patient. Presents to establish care.  Sees ENT, Dr. Andrews. Had sinus surgeyr in the past. Has h/o eustachian tube dysfunction. Felt LH on standing and pressure. Restarted Dymista and added Allegra. Was taking sinfulair for skin allergy symptoms.  Has h/o hemorrhoid issues and had surgeyr a few times. Takes fiber supplemtns and probiotics.  Has h/o HTN and HL an takes medications.  Brother  of CVA at age 52.  Prompted her to lose a lot of weight. Losing about 3-4 lbs a month using a special scale and tiff. Lost 50 lbs in the last few years. Stopped using the scale in July. Was 206 lbs 2019.  Got one online.  Has been on an antidepressant for a while by psych. Increased to Wellbutrin 300mg which has been helping. Sees an allergist and dermatologist. On Aldactone for adult acne.  On topicals for facial swelling from various allergies.  Has h/o L. Partial knee replacement a couple years ago.    UTD with vaccines.  Noticed some hair loss. Tried stopping cholesterol med, but no changes. Restarted med.  Periods are still regular.  Eyes get teary and nose is runny. Not always consistent with the nasal sprays. Takes all meds at night.  Feels like her taste buds have changes. Eating less carbs such as pizza and cheese.  Has a lot going on.  had melanoma twice. Youngest son, 10, has high functioning autism. Oldest son,16, had a lot of issues, but was able to turn it around and doing great. Daughter, 13, has ADHD and anxiety and having a hard time but doing better. Loves family. UTD with mammogram and GYN visit.  Compliant with all medications. Needs refills.     The following have been reviewed and updated as appropriate in this visit:    Allergies  Meds  Problems         Past Medical History:   Diagnosis Date   • Allergic rhinitis    • BMI 40.0-44.9, adult (CMS/[facility])    • Depression 2012   • Dysfunction of both eustachian  tubes    • Essential hypertension, benign 2012   • Hyperlipidemia    • Right bundle branch block 2012   • Sensorineural hearing loss, bilateral    • Shingles     on head, age 20's   • Varicose veins of lower extremity    • Vitamin D deficiency      Past Surgical History:   Procedure Laterality Date   • PARTIAL KNEE ARTHROPLASTY Left      Family History   Problem Relation Age of Onset   • Atrial fibrillation Biological Mother    • Stroke Biological Father         multiple. memory issues, paranoia etc.    • Stroke Biological Brother          of CVA age 52   • ADD / ADHD Biological Daughter    • Anxiety disorder Biological Daughter    • No Known Problems Biological Son    • Autism spectrum disorder Biological Son         high functioning     Social History     Socioeconomic History   • Marital status:      Spouse name: None   • Number of children: 3   • Years of education: None   • Highest education level: None   Occupational History   • Occupation: [employer]   Tobacco Use   • Smoking status: Former Smoker   • Smokeless tobacco: Never Used   Substance and Sexual Activity   • Alcohol use: Yes   • Drug use: None   • Sexual activity: Yes     Partners: Male   Other Topics Concern   • None   Social History Narrative    Children- 2 sons (16, 10), 1 daughter (13). 2021    Caffeine -     Exercise-     Diet-     Pets-     [employer] affiliation-          Social Determinants of Health     Financial Resource Strain:    • Difficulty of Paying Living Expenses: Not on file   Food Insecurity:    • Worried About Running Out of Food in the Last Year: Not on file   • Ran Out of Food in the Last Year: Not on file   Transportation Needs:    • Lack of Transportation (Medical): Not on file   • Lack of Transportation (Non-Medical): Not on file   Physical Activity:    • Days of Exercise per Week: Not on file   • Minutes of Exercise per Session: Not on file   Stress:    • Feeling of Stress : Not on file   Social Connections:     • Frequency of Communication with Friends and Family: Not on file   • Frequency of Social Gatherings with Friends and Family: Not on file   • Attends Sabianism Services: Not on file   • Active Member of Clubs or Organizations: Not on file   • Attends Club or Organization Meetings: Not on file   • Marital Status: Not on file   Intimate Partner Violence:    • Fear of Current or Ex-Partner: Not on file   • Emotionally Abused: Not on file   • Physically Abused: Not on file   • Sexually Abused: Not on file   Housing Stability:    • Unable to Pay for Housing in the Last Year: Not on file   • Number of Places Lived in the Last Year: Not on file   • Unstable Housing in the Last Year: Not on file       Review of Systems   Constitutional: Negative for fever and unexpected weight change.   HENT: Positive for congestion and postnasal drip.    Respiratory: Negative for shortness of breath.    Cardiovascular: Negative for chest pain.   Gastrointestinal: Negative for abdominal pain.   Genitourinary: Negative for difficulty urinating.   Allergic/Immunologic: Positive for environmental allergies.   Psychiatric/Behavioral: Negative for dysphoric mood and sleep disturbance. The patient is not nervous/anxious.        Allergies   Allergen Reactions   • Neomycin Hives   • Neomycin-Bacitracnzn-Polymyxnb Itching   • Penicillins    • Latex Rash   • Miconazole Rash     Current Outpatient Medications   Medication Sig Dispense Refill   • atorvastatin (LIPITOR) 20 mg tablet Take 1 tablet (20 mg total) by mouth daily. 90 tablet 3   • buPROPion XL (WELLBUTRIN XL) 300 mg 24 hr tablet Take 1 tablet (300 mg total) by mouth daily. 90 tablet 3   • crisaborole (EUCRISA) 2 % ointment Eucrisa 2 % topical ointment     • fexofenadine (ALLEGRA) 180 mg tablet Take 1 tablet (180 mg total) by mouth daily. 90 tablet 3   • hydrochlorothiazide (HYDRODIURIL) 25 mg tablet Take 1 tablet (25 mg total) by mouth daily. 90 tablet 3   • losartan (COZAAR) 50 mg tablet  "Take 1 tablet (50 mg total) by mouth daily. 90 tablet 3   • montelukast (SINGULAIR) 10 mg tablet Take 1 tablet (10 mg total) by mouth once daily. 90 tablet 3   • spironolactone (ALDACTONE) 100 mg tablet Take 1 tablet (100 mg total) by mouth daily. 90 tablet 3   • azelastine (ASTELIN) 137 mcg (0.1 %) nasal spray Administer 1 spray into each nostril 2 (two) times a day. Use in each nostril as directed. 30 mL 3   • fluticasone propionate (FLONASE) 50 mcg/actuation nasal spray Administer 1 spray into each nostril 2 (two) times a day. With Astelin 16 g 3     No current facility-administered medications for this visit.       Objective   Vitals:    09/30/21 0921   BP: 116/68   Pulse: 78   Resp: 16   Temp: 36.7 °C (98.1 °F)   SpO2: 99%   Weight: 70.3 kg (155 lb)   Height: 1.575 m (5' 2\")     Body mass index is 28.35 kg/m².    Physical Exam  Constitutional:       Appearance: Normal appearance. She is well-developed.   HENT:      Head: Normocephalic and atraumatic.   Eyes:      General: Lids are normal.   Cardiovascular:      Rate and Rhythm: Normal rate and regular rhythm.      Heart sounds: Normal heart sounds, S1 normal and S2 normal. No murmur heard.      Pulmonary:      Effort: Pulmonary effort is normal.      Breath sounds: Normal breath sounds. No decreased breath sounds, rhonchi or rales.   Abdominal:      General: Bowel sounds are normal.      Palpations: Abdomen is soft.      Tenderness: There is no abdominal tenderness.   Musculoskeletal:      Cervical back: Neck supple.   Skin:     General: Skin is warm and dry.   Neurological:      Mental Status: She is alert and oriented to person, place, and time.      Cranial Nerves: No cranial nerve deficit.      Gait: Gait normal.   Psychiatric:         Behavior: Behavior normal.         Thought Content: Thought content normal.         Judgment: Judgment normal.         Assessment/Plan   Problem List Items Addressed This Visit        Nervous    Dysfunction of both eustachian " tubes     Continue Dymista and Allegra.  Follow-up with ENT            Respiratory    Allergic rhinitis     Continue Allegra, Singulair and Dymista.  Refilled meds         Relevant Medications    montelukast (SINGULAIR) 10 mg tablet    fexofenadine (ALLEGRA) 180 mg tablet       Circulatory    Essential hypertension, benign - Primary     Well-controlled on losartan 50 mg and HCTZ 12.5 mg         Relevant Medications    hydrochlorothiazide (HYDRODIURIL) 25 mg tablet    losartan (COZAAR) 50 mg tablet    spironolactone (ALDACTONE) 100 mg tablet       Other    Acne     Continue spironolactone.         Depression     Well-controlled on Wellbutrin 300 mg daily.         Relevant Medications    buPROPion XL (WELLBUTRIN XL) 300 mg 24 hr tablet    fexofenadine (ALLEGRA) 180 mg tablet    Loss of hair     Felt he may have been related to cholesterol medication but restarted when it did not make any difference after she stopped.  Continue follow-up with dermatologist.  Due for labs         Routine lab draw    Relevant Orders    CBC and Differential    Comprehensive metabolic panel    Lipid panel    TSH w reflex FT4          Caren Guardado MD    10/5/2021

## 2021-10-05 PROBLEM — J34.2 DEVIATED NASAL SEPTUM: Status: ACTIVE | Noted: 2021-10-05

## 2021-10-05 PROBLEM — R63.4 WEIGHT LOSS: Status: ACTIVE | Noted: 2021-10-05

## 2021-10-05 PROBLEM — E66.9 OBESITY: Status: ACTIVE | Noted: 2021-10-05

## 2021-10-05 PROBLEM — L65.9 LOSS OF HAIR: Status: ACTIVE | Noted: 2021-10-05

## 2021-10-05 PROBLEM — S89.90XA KNEE INJURY: Status: ACTIVE | Noted: 2021-10-05

## 2021-10-05 PROBLEM — K64.5 EXTERNAL THROMBOSED HEMORRHOIDS: Status: ACTIVE | Noted: 2020-07-14

## 2021-10-05 PROBLEM — J32.4 CHRONIC PANSINUSITIS: Status: ACTIVE | Noted: 2021-10-05

## 2021-10-05 PROBLEM — J34.3 HYPERTROPHY OF NASAL TURBINATES: Status: ACTIVE | Noted: 2021-10-05

## 2021-10-05 PROBLEM — E78.5 HYPERLIPIDEMIA: Status: ACTIVE | Noted: 2021-10-05

## 2021-10-05 PROBLEM — H69.93 DYSFUNCTION OF BOTH EUSTACHIAN TUBES: Status: ACTIVE | Noted: 2021-10-05

## 2021-10-05 PROBLEM — R06.83 SNORING: Status: ACTIVE | Noted: 2021-10-05

## 2021-10-05 PROBLEM — J30.9 ALLERGIC RHINITIS: Status: ACTIVE | Noted: 2021-10-05

## 2021-10-05 PROBLEM — B00.1 HERPES LABIALIS: Status: ACTIVE | Noted: 2021-10-05

## 2021-10-05 PROBLEM — E55.9 VITAMIN D DEFICIENCY: Status: ACTIVE | Noted: 2021-10-05

## 2021-10-05 PROBLEM — K64.1 GRADE II INTERNAL HEMORRHOIDS: Status: ACTIVE | Noted: 2021-07-19

## 2021-10-05 PROBLEM — Z01.89 ROUTINE LAB DRAW: Status: ACTIVE | Noted: 2021-10-05

## 2021-10-05 PROBLEM — I83.90 VARICOSE VEINS OF LOWER EXTREMITY: Status: ACTIVE | Noted: 2021-10-05

## 2021-10-05 PROBLEM — L70.9 ACNE: Status: ACTIVE | Noted: 2021-10-05

## 2021-10-05 PROBLEM — H90.3 SENSORINEURAL HEARING LOSS, BILATERAL: Status: ACTIVE | Noted: 2021-10-05

## 2021-10-05 ASSESSMENT — ENCOUNTER SYMPTOMS
UNEXPECTED WEIGHT CHANGE: 0
FEVER: 0
SHORTNESS OF BREATH: 0
SLEEP DISTURBANCE: 0
NERVOUS/ANXIOUS: 0
DIFFICULTY URINATING: 0
DYSPHORIC MOOD: 0
ABDOMINAL PAIN: 0

## 2021-10-05 NOTE — ASSESSMENT & PLAN NOTE
Felt he may have been related to cholesterol medication but restarted when it did not make any difference after she stopped.  Continue follow-up with dermatologist.  Due for labs

## 2021-10-06 ENCOUNTER — TELEPHONE (OUTPATIENT)
Dept: INTERNAL MEDICINE | Facility: CLINIC | Age: 51
End: 2021-10-06

## 2021-10-06 NOTE — TELEPHONE ENCOUNTER
Patient would now like dymista to be sent to [facility] mail order pharmacy.  She said it will need a prior authorization.  The number for prior authorization is [id number].

## 2021-10-08 ENCOUNTER — TELEPHONE (OUTPATIENT)
Dept: INTERNAL MEDICINE | Facility: CLINIC | Age: 51
End: 2021-10-08

## 2021-10-08 NOTE — TELEPHONE ENCOUNTER
Spoke w/ pt per LD Tobar and confirmed that there was no fever. Informed pt per LD Tobar that if sx worsen that should could go to the UC/ ED for further eval. Thank you.

## 2021-10-08 NOTE — TELEPHONE ENCOUNTER
Scheduled appt, patient requesting a call back to know what next steps she need to do for her pressure in her both ears.

## 2021-10-12 ENCOUNTER — OFFICE VISIT (OUTPATIENT)
Dept: INTERNAL MEDICINE | Facility: CLINIC | Age: 51
End: 2021-10-12
Payer: COMMERCIAL

## 2021-10-12 ENCOUNTER — TELEPHONE (OUTPATIENT)
Dept: INTERNAL MEDICINE | Facility: CLINIC | Age: 51
End: 2021-10-12

## 2021-10-12 VITALS
HEIGHT: 62 IN | SYSTOLIC BLOOD PRESSURE: 122 MMHG | BODY MASS INDEX: 29.26 KG/M2 | HEART RATE: 79 BPM | TEMPERATURE: 98.9 F | OXYGEN SATURATION: 99 % | WEIGHT: 159 LBS | DIASTOLIC BLOOD PRESSURE: 82 MMHG

## 2021-10-12 DIAGNOSIS — R51.9 ACUTE NONINTRACTABLE HEADACHE, UNSPECIFIED HEADACHE TYPE: ICD-10-CM

## 2021-10-12 DIAGNOSIS — H69.92 EUSTACHIAN TUBE DYSFUNCTION, LEFT: Primary | ICD-10-CM

## 2021-10-12 DIAGNOSIS — I10 ESSENTIAL HYPERTENSION, BENIGN: ICD-10-CM

## 2021-10-12 DIAGNOSIS — H65.22 LEFT CHRONIC SEROUS OTITIS MEDIA: ICD-10-CM

## 2021-10-12 PROCEDURE — 99214 OFFICE O/P EST MOD 30 MIN: CPT | Performed by: NURSE PRACTITIONER

## 2021-10-12 PROCEDURE — 3008F BODY MASS INDEX DOCD: CPT | Performed by: NURSE PRACTITIONER

## 2021-10-12 RX ORDER — FLUTICASONE PROPIONATE 50 MCG
1 SPRAY, SUSPENSION (ML) NASAL 2 TIMES DAILY
Qty: 16 G | Refills: 3 | Status: SHIPPED | OUTPATIENT
Start: 2021-10-12 | End: 2022-02-09 | Stop reason: SDUPTHER

## 2021-10-12 RX ORDER — METHYLPREDNISOLONE 4 MG/1
TABLET ORAL
Qty: 21 TABLET | Refills: 0 | Status: SHIPPED | OUTPATIENT
Start: 2021-10-12 | End: 2021-10-12

## 2021-10-12 RX ORDER — METHYLPREDNISOLONE 4 MG/1
TABLET ORAL
Qty: 21 TABLET | Refills: 0 | Status: SHIPPED | OUTPATIENT
Start: 2021-10-12 | End: 2021-10-19

## 2021-10-12 RX ORDER — AZELASTINE 1 MG/ML
1 SPRAY, METERED NASAL 2 TIMES DAILY
Qty: 180 ML | Refills: 3 | Status: SHIPPED | OUTPATIENT
Start: 2021-10-12 | End: 2022-02-09 | Stop reason: SDUPTHER

## 2021-10-12 ASSESSMENT — ENCOUNTER SYMPTOMS
LIGHT-HEADEDNESS: 0
DIARRHEA: 0
FACIAL ASYMMETRY: 0
FATIGUE: 0
CONSTIPATION: 0
NAUSEA: 0
MYALGIAS: 0
ABDOMINAL PAIN: 0
HEADACHES: 1
COUGH: 0
FREQUENCY: 0
PALPITATIONS: 0
EYE PAIN: 0
WHEEZING: 0
DIZZINESS: 1
VOMITING: 0
SINUS PAIN: 0
DYSURIA: 0
SORE THROAT: 0
ADENOPATHY: 0
EYE REDNESS: 0
SHORTNESS OF BREATH: 0
CHEST TIGHTNESS: 0
VOICE CHANGE: 0
SPEECH DIFFICULTY: 0
ARTHRALGIAS: 0
WEAKNESS: 0
FEVER: 0
NUMBNESS: 0
CHILLS: 0
SINUS PRESSURE: 0

## 2021-10-12 NOTE — TELEPHONE ENCOUNTER
Pt states medication was called into the mail order pharmacy and should have gone to the local pharmacy  Please re send    methylPREDNISolone 4 mg tablet

## 2021-10-12 NOTE — PROGRESS NOTES
Subjective      Patient ID: Aurora Cedillo is a 51 y.o. female.    51-year-old female with a past medical history of depression, eustachian tube dysfunction, hyperlipidemia, allergic rhinitis presents for an in office visit today.  Patient reports when she looks down and then comes up she started to feel dizzy at times.  She has had a similar presentation in the past and was put on steroids due to eustachian tube dysfunction.  She reports her ENT had done this.  She does not feel like it is her sinuses at this time.  Denies any recent nasal congestion or discolored nasal discharge, sore throat, coughing, fever, chills, maxillary pressure/pain.  Denies any ear pain or ringing.  Does complain of a headache above the ear but there is no rash.  Ear feels full.      The following have been reviewed and updated as appropriate in this visit:    Meds         Past Medical History:   Diagnosis Date   • Allergic rhinitis    • BMI 40.0-44.9, adult (CMS/[facility])    • Depression 2012   • Dysfunction of both eustachian tubes    • Essential hypertension, benign 2012   • Hyperlipidemia    • Right bundle branch block 2012   • Sensorineural hearing loss, bilateral    • Shingles     on head, age 20's   • Varicose veins of lower extremity    • Vitamin D deficiency      Past Surgical History:   Procedure Laterality Date   • PARTIAL KNEE ARTHROPLASTY Left      Family History   Problem Relation Age of Onset   • Atrial fibrillation Biological Mother    • Stroke Biological Father         multiple. memory issues, paranoia etc.    • Stroke Biological Brother          of CVA age 52   • ADD / ADHD Biological Daughter    • Anxiety disorder Biological Daughter    • No Known Problems Biological Son    • Autism spectrum disorder Biological Son         high functioning     Social History     Tobacco Use   • Smoking status: Former Smoker   • Smokeless tobacco: Never Used   Vaping Use   • Vaping Use: Never used   Substance Use Topics    • Alcohol use: Yes   • Drug use: Not on file       Review of Systems   Constitutional: Negative for chills, fatigue and fever.   HENT: Negative for congestion, ear discharge, ear pain, postnasal drip, sinus pressure, sinus pain, sore throat and voice change.    Eyes: Negative for pain and redness.   Respiratory: Negative for cough, chest tightness, shortness of breath and wheezing.    Cardiovascular: Negative for chest pain and palpitations.   Gastrointestinal: Negative for abdominal pain, constipation, diarrhea, nausea and vomiting.   Genitourinary: Negative for dysuria, frequency and urgency.   Musculoskeletal: Negative for arthralgias and myalgias.   Skin: Negative for rash.   Neurological: Positive for dizziness and headaches. Negative for facial asymmetry, speech difficulty, weakness, light-headedness and numbness.   Hematological: Negative for adenopathy.       Allergies   Allergen Reactions   • Neomycin Hives   • Neomycin-Bacitracnzn-Polymyxnb Itching   • Penicillins    • Latex Rash   • Miconazole Rash     Current Outpatient Medications   Medication Sig Dispense Refill   • atorvastatin (LIPITOR) 20 mg tablet Take 1 tablet (20 mg total) by mouth daily. 90 tablet 3   • azelastine 137 mcg (0.1 %) nasal spray Administer 1 spray into each nostril 2 (two) times a day. Use in each nostril as directed. 180 mL 3   • buPROPion XL (WELLBUTRIN XL) 300 mg 24 hr tablet Take 1 tablet (300 mg total) by mouth daily. 90 tablet 3   • fexofenadine (ALLEGRA) 180 mg tablet Take 1 tablet (180 mg total) by mouth daily. 90 tablet 3   • fluticasone propionate 50 mcg/actuation nasal spray Administer 1 spray into each nostril 2 (two) times a day. With Astelin 16 g 3   • hydrochlorothiazide (HYDRODIURIL) 25 mg tablet Take 1 tablet (25 mg total) by mouth daily. 90 tablet 3   • losartan (COZAAR) 50 mg tablet Take 1 tablet (50 mg total) by mouth daily. 90 tablet 3   • montelukast (SINGULAIR) 10 mg tablet Take 1 tablet (10 mg total) by  "mouth once daily. 90 tablet 3   • spironolactone (ALDACTONE) 100 mg tablet Take 1 tablet (100 mg total) by mouth daily. 90 tablet 3   • crisaborole (EUCRISA) 2 % ointment Eucrisa 2 % topical ointment     • methylPREDNISolone 4 mg tablet Follow package directions. 21 tablet 0     No current facility-administered medications for this visit.       Objective   Vitals:    10/12/21 0921   BP: 122/82   BP Location: Left upper arm   Patient Position: Sitting   Pulse: 79   Temp: 37.2 °C (98.9 °F)   SpO2: 99%   Weight: 72.1 kg (159 lb)   Height: 1.575 m (5' 2\")       Physical Exam  Vitals reviewed.   Constitutional:       General: She is not in acute distress.     Appearance: Normal appearance. She is not toxic-appearing.   HENT:      Right Ear: Hearing, tympanic membrane, ear canal and external ear normal.      Left Ear: Hearing, ear canal and external ear normal. A middle ear effusion is present.   Eyes:      General: No scleral icterus.     Extraocular Movements: Extraocular movements intact.      Conjunctiva/sclera: Conjunctivae normal.   Cardiovascular:      Rate and Rhythm: Normal rate and regular rhythm.      Heart sounds: Normal heart sounds.   Pulmonary:      Effort: Pulmonary effort is normal.      Breath sounds: Normal breath sounds.   Musculoskeletal:      Cervical back: Normal range of motion and neck supple.   Skin:     General: Skin is warm and dry.      Capillary Refill: Capillary refill takes less than 2 seconds.   Neurological:      General: No focal deficit present.      Mental Status: She is alert and oriented to person, place, and time.   Psychiatric:         Mood and Affect: Mood normal.         Behavior: Behavior normal.         Thought Content: Thought content normal.         Judgment: Judgment normal.         Assessment/Plan   Problem List Items Addressed This Visit        Nervous    Eustachian tube dysfunction, left - Primary     Like similar presentation.  Will start medrol dose pack.  Advised " patient to follow up with ENT to review treatment regimen and discuss options.           Acute nonintractable headache     Likely referred pain. Medrol dose pack ordered.          Left chronic serous otitis media     Continue flonase and astelin--[facility]            Circulatory    Essential hypertension, benign     Continue losartan and hctz.  Controlled.                ISRRAEL Mendoza    10/12/2021

## 2021-10-12 NOTE — ASSESSMENT & PLAN NOTE
Like similar presentation.  Will start medrol dose pack.  Advised patient to follow up with ENT to review treatment regimen and discuss options.

## 2021-10-21 ENCOUNTER — TELEPHONE (OUTPATIENT)
Dept: INTERNAL MEDICINE | Facility: CLINIC | Age: 51
End: 2021-10-21

## 2021-10-21 DIAGNOSIS — Z12.31 BREAST CANCER SCREENING BY MAMMOGRAM: Primary | ICD-10-CM

## 2021-12-21 ENCOUNTER — TELEPHONE (OUTPATIENT)
Dept: INTERNAL MEDICINE | Facility: CLINIC | Age: 51
End: 2021-12-21

## 2021-12-21 ENCOUNTER — OFFICE VISIT (OUTPATIENT)
Dept: INTERNAL MEDICINE | Facility: CLINIC | Age: 51
End: 2021-12-21
Payer: COMMERCIAL

## 2021-12-21 VITALS
SYSTOLIC BLOOD PRESSURE: 114 MMHG | HEIGHT: 62 IN | RESPIRATION RATE: 15 BRPM | OXYGEN SATURATION: 99 % | TEMPERATURE: 98.5 F | BODY MASS INDEX: 30 KG/M2 | HEART RATE: 89 BPM | DIASTOLIC BLOOD PRESSURE: 74 MMHG | WEIGHT: 163 LBS

## 2021-12-21 DIAGNOSIS — R10.2 PELVIC PAIN IN FEMALE: Primary | ICD-10-CM

## 2021-12-21 PROCEDURE — 3008F BODY MASS INDEX DOCD: CPT | Performed by: INTERNAL MEDICINE

## 2021-12-21 PROCEDURE — 90750 HZV VACC RECOMBINANT IM: CPT | Performed by: INTERNAL MEDICINE

## 2021-12-21 PROCEDURE — 99213 OFFICE O/P EST LOW 20 MIN: CPT | Mod: 25 | Performed by: INTERNAL MEDICINE

## 2021-12-21 PROCEDURE — 90471 IMMUNIZATION ADMIN: CPT | Performed by: INTERNAL MEDICINE

## 2021-12-21 ASSESSMENT — ENCOUNTER SYMPTOMS
WEAKNESS: 0
FEVER: 0
SHORTNESS OF BREATH: 0
NUMBNESS: 0
DIZZINESS: 0
FATIGUE: 0
APPETITE CHANGE: 0
FLANK PAIN: 0
NERVOUS/ANXIOUS: 1
CHILLS: 0
DYSURIA: 0
FREQUENCY: 0
ABDOMINAL PAIN: 1
DIFFICULTY URINATING: 0
HEADACHES: 0
PALPITATIONS: 0
CONSTIPATION: 0
BACK PAIN: 0
DIARRHEA: 0
UNEXPECTED WEIGHT CHANGE: 0

## 2021-12-21 NOTE — PROGRESS NOTES
Subjective      Patient ID: Aurora Cedillo is a 51 y.o. female.    HPI    Patient presents with c/o RLQ pain.  Developed pain in the R.lower abdomen on  morning, 2 days ago, while walking around.  Worse with certain movements and bending, getting in and out of the car, etc.  No N/V. Thought it could be gas pain. No constipation or diarrhea. No vaginal bleeding or dysuria.  Had a pelvic US 2 weeks ago ordered by GYN which showed a cervical lesion, determined to be a fibroid on visual exam.  Also had 1.3 cm right ovarian cyst.  Currently feeling overall better.  Woke up thinking symptoms were resolved but still having a slight twinge here and there.  No other new/acute complaints.    The following have been reviewed and updated as appropriate in this visit:     Allergies  Meds  Problems         Past Medical History:   Diagnosis Date   • Allergic rhinitis    • BMI 40.0-44.9, adult (CMS/[facility])    • Depression 2012   • Dysfunction of both eustachian tubes    • Essential hypertension, benign 2012   • Hyperlipidemia    • Right bundle branch block 2012   • Sensorineural hearing loss, bilateral    • Shingles     on head, age 20's   • Varicose veins of lower extremity    • Vitamin D deficiency      Past Surgical History:   Procedure Laterality Date   • PARTIAL KNEE ARTHROPLASTY Left      Family History   Problem Relation Age of Onset   • Atrial fibrillation Biological Mother    • Stroke Biological Father         multiple. memory issues, paranoia etc.    • Stroke Biological Brother          of CVA age 52   • ADD / ADHD Biological Daughter    • Anxiety disorder Biological Daughter    • No Known Problems Biological Son    • Autism spectrum disorder Biological Son         high functioning     Social History     Socioeconomic History   • Marital status:      Spouse name: None   • Number of children: 3   • Years of education: None   • Highest education level: None   Occupational History   •  Occupation: [employer]   Tobacco Use   • Smoking status: Former Smoker   • Smokeless tobacco: Never Used   Vaping Use   • Vaping Use: Never used   Substance and Sexual Activity   • Alcohol use: Yes   • Drug use: None   • Sexual activity: Yes     Partners: Male   Other Topics Concern   • None   Social History Narrative    Children- 2 sons (16, 10), 1 daughter (13). 9/2021    Caffeine -     Exercise-     Diet-     Pets-     [employer] affiliation-          Social Determinants of Health     Financial Resource Strain: Not on file   Food Insecurity: Not on file   Transportation Needs: Not on file   Physical Activity: Not on file   Stress: Not on file   Social Connections: Not on file   Intimate Partner Violence: Not on file   Housing Stability: Not on file       Review of Systems   Constitutional: Negative for appetite change, chills, fatigue, fever and unexpected weight change.   Respiratory: Negative for shortness of breath.    Cardiovascular: Negative for chest pain, palpitations and leg swelling.   Gastrointestinal: Positive for abdominal pain (Right lower quadrant). Negative for constipation and diarrhea.   Genitourinary: Positive for pelvic pain (Right lower). Negative for difficulty urinating, dysuria, flank pain, frequency, menstrual problem, vaginal bleeding and vaginal discharge.   Musculoskeletal: Negative for back pain.   Skin: Negative for rash.   Neurological: Negative for dizziness, weakness, numbness and headaches.   Psychiatric/Behavioral: The patient is nervous/anxious.        Allergies   Allergen Reactions   • Neomycin Hives   • Neomycin-Bacitracnzn-Polymyxnb Itching   • Penicillins    • Latex Rash   • Miconazole Rash     Current Outpatient Medications   Medication Sig Dispense Refill   • atorvastatin (LIPITOR) 20 mg tablet Take 1 tablet (20 mg total) by mouth daily. 90 tablet 3   • azelastine 137 mcg (0.1 %) nasal spray Administer 1 spray into each nostril 2 (two) times a day. Use in each nostril as  "directed. 180 mL 3   • buPROPion XL (WELLBUTRIN XL) 300 mg 24 hr tablet Take 1 tablet (300 mg total) by mouth daily. 90 tablet 3   • crisaborole (EUCRISA) 2 % ointment Eucrisa 2 % topical ointment     • fluticasone propionate 50 mcg/actuation nasal spray Administer 1 spray into each nostril 2 (two) times a day. With Astelin 16 g 3   • hydrochlorothiazide (HYDRODIURIL) 25 mg tablet Take 1 tablet (25 mg total) by mouth daily. 90 tablet 3   • losartan (COZAAR) 50 mg tablet Take 1 tablet (50 mg total) by mouth daily. 90 tablet 3   • montelukast (SINGULAIR) 10 mg tablet Take 1 tablet (10 mg total) by mouth once daily. 90 tablet 3   • spironolactone (ALDACTONE) 100 mg tablet Take 1 tablet (100 mg total) by mouth daily. 90 tablet 3   • fexofenadine (ALLEGRA) 180 mg tablet Take 1 tablet (180 mg total) by mouth daily. 90 tablet 3     No current facility-administered medications for this visit.       Objective   Vitals:    12/21/21 1500   BP: 114/74   Pulse: 89   Resp: 15   Temp: 36.9 °C (98.5 °F)   SpO2: 99%   Weight: 73.9 kg (163 lb)   Height: 1.575 m (5' 2\")     Body mass index is 29.81 kg/m².    Physical Exam  Constitutional:       Appearance: Normal appearance. She is well-developed.   HENT:      Head: Normocephalic and atraumatic.   Eyes:      General: Lids are normal.   Cardiovascular:      Rate and Rhythm: Normal rate and regular rhythm.      Heart sounds: Normal heart sounds, S1 normal and S2 normal. No murmur heard.  Pulmonary:      Effort: Pulmonary effort is normal.      Breath sounds: Normal breath sounds. No decreased breath sounds, rhonchi or rales.   Abdominal:      General: Abdomen is flat. Bowel sounds are normal. There is no distension.      Palpations: Abdomen is soft. There is no mass.      Tenderness: There is abdominal tenderness (Right lower quadrant, on deep palpation). There is no guarding or rebound.   Musculoskeletal:      Cervical back: Neck supple.   Skin:     General: Skin is warm and dry.      " Findings: No erythema or rash.   Neurological:      Mental Status: She is alert and oriented to person, place, and time.      Cranial Nerves: No cranial nerve deficit.      Gait: Gait normal.   Psychiatric:         Mood and Affect: Mood normal.         Behavior: Behavior normal.         Thought Content: Thought content normal.         Judgment: Judgment normal.         Assessment/Plan   Problem List Items Addressed This Visit        Nervous    Pelvic pain in female - Primary     Slowly improving.  Unlikely appendicitis.  Possible ruptured right ovarian cyst.  Having occasional twinges of pain throughout the day.  Continue to monitor for improvement.  If symptoms severe, advised to go to the ER.  If persistent, advised to obtain CT abdomen and pelvis to evaluate.         Relevant Orders    CT ABDOMEN PELVIS WITH IV CONTRAST          Caren Guardado MD    12/21/2021

## 2021-12-21 NOTE — ASSESSMENT & PLAN NOTE
Slowly improving.  Unlikely appendicitis.  Possible ruptured right ovarian cyst.  Having occasional twinges of pain throughout the day.  Continue to monitor for improvement.  If symptoms severe, advised to go to the ER.  If persistent, advised to obtain CT abdomen and pelvis to evaluate.

## 2021-12-21 NOTE — TELEPHONE ENCOUNTER
Spoke with the patient. Pt stated that her pain is not severe and pain comes and goes.  Pt denied fever, N/V or vaginal bleeding. Pt feels that she doesn't need to go to the ED.  I told her that it will take more times to get pricert for CT if Dr. Guardado orders CT scan when she visits. Pt understood.  Pt wanted to make sure we have ultrasound result in our system which was done at [facility] in 12/9.  They are in the system.

## 2021-12-21 NOTE — TELEPHONE ENCOUNTER
----- Message from Teresa Gregory MA sent at 12/21/2021  7:21 AM EST -----  Regarding: FW: lower left abdominal pain     ----- Message -----  From: Aurora May Mamadou  Sent: 12/20/2021   4:25 PM EST  To: [facility] Clinical Support [facility]  Subject: lower left abdominal pain                        Hello,    I called this morning to get direction on what was going on with a pain in the right lower side of my abdomen. They transferred me to scheduling to book an appointment, and eventually someone got back to me and I booked an appointment to see you tomorrow (Tuesday 12/21/21 at 3 pm). Since it is pain in the area of my appendix, I asked for an appointment asap. In the meantime, I will go to the ER if the pain worsens. It hurt a lot yesterday, but improved a bit today.    I wanted to let you know that a few weeks ago my GYN found a bump in my yearly exam, so I had a pelvic ultrasound. The two tests done at [facility] were faxed to your office. My GYN was not concerned with the results, and believes it is likely a very tiny fibroid, which gave me no pain or other symptoms. They also mentioned a diverticulum? in one of the scans. Anyway, I thought the findings of these tests may help you determine the cause of the pain in my lower right abdomen, midway between my belly button and my hip area. Just wanted to make sure you were able to review these tests. If they didn’t arrive, please let me know and I can have them resend.    I will see you at 3 pm tomorrow.    Thanks,  JORDAN Cedillo

## 2021-12-21 NOTE — TELEPHONE ENCOUNTER
Please call patient and see how her RLQ pain is doing. If she's in significant pain, she should go to the ER to r/o appendicitis or ovarian cyst rupture.  If she's still having just mild intermittent pain, she can keep her appointment at 3pm with me.  Thank you

## 2022-01-04 ENCOUNTER — TELEPHONE (OUTPATIENT)
Dept: INTERNAL MEDICINE | Facility: CLINIC | Age: 52
End: 2022-01-04
Payer: COMMERCIAL

## 2022-01-04 NOTE — TELEPHONE ENCOUNTER
Spoke to patient. Informed of CT results.  No ovarian cyst noted.  Again noted was 15mm ureterocele at the R.UVJ.  Referred to Dr. Ifrah Velazquez for evaluation.  Still having some abd pain.  CT noted mild-mod constipation.  Pt stopped taking her fiber supplements.  Will restart.

## 2022-01-26 LAB
ALBUMIN SERPL-MCNC: 4.7 G/DL (ref 3.6–5.1)
ALBUMIN/GLOB SERPL: 1.8 (CALC) (ref 1–2.5)
ALP SERPL-CCNC: 61 U/L (ref 37–153)
ALT SERPL-CCNC: 13 U/L (ref 6–29)
AST SERPL-CCNC: 14 U/L (ref 10–35)
BASOPHILS # BLD AUTO: 68 CELLS/UL (ref 0–200)
BASOPHILS NFR BLD AUTO: 0.7 %
BILIRUB SERPL-MCNC: 0.8 MG/DL (ref 0.2–1.2)
BUN SERPL-MCNC: 10 MG/DL (ref 7–25)
BUN/CREAT SERPL: NORMAL (CALC) (ref 6–22)
CALCIUM SERPL-MCNC: 10.1 MG/DL (ref 8.6–10.4)
CHLORIDE SERPL-SCNC: 100 MMOL/L (ref 98–110)
CHOLEST SERPL-MCNC: 157 MG/DL
CHOLEST/HDLC SERPL: 2.7 (CALC)
CO2 SERPL-SCNC: 29 MMOL/L (ref 20–32)
CREAT SERPL-MCNC: 0.74 MG/DL (ref 0.5–1.05)
EOSINOPHIL # BLD AUTO: 136 CELLS/UL (ref 15–500)
EOSINOPHIL NFR BLD AUTO: 1.4 %
ERYTHROCYTE [DISTWIDTH] IN BLOOD BY AUTOMATED COUNT: 12 % (ref 11–15)
GLOBULIN SER CALC-MCNC: 2.6 G/DL (CALC) (ref 1.9–3.7)
GLUCOSE SERPL-MCNC: 85 MG/DL (ref 65–99)
HCT VFR BLD AUTO: 45.8 % (ref 35–45)
HDLC SERPL-MCNC: 58 MG/DL
HGB BLD-MCNC: 15.2 G/DL (ref 11.7–15.5)
LDLC SERPL CALC-MCNC: 79 MG/DL (CALC)
LYMPHOCYTES # BLD AUTO: 1892 CELLS/UL (ref 850–3900)
LYMPHOCYTES NFR BLD AUTO: 19.5 %
MCH RBC QN AUTO: 30.5 PG (ref 27–33)
MCHC RBC AUTO-ENTMCNC: 33.2 G/DL (ref 32–36)
MCV RBC AUTO: 91.8 FL (ref 80–100)
MONOCYTES # BLD AUTO: 679 CELLS/UL (ref 200–950)
MONOCYTES NFR BLD AUTO: 7 %
NEUTROPHILS # BLD AUTO: 6926 CELLS/UL (ref 1500–7800)
NEUTROPHILS NFR BLD AUTO: 71.4 %
NONHDLC SERPL-MCNC: 99 MG/DL (CALC)
PLATELET # BLD AUTO: 334 THOUSAND/UL (ref 140–400)
PMV BLD REES-ECKER: 10.7 FL (ref 7.5–12.5)
POTASSIUM SERPL-SCNC: 4.6 MMOL/L (ref 3.5–5.3)
PROT SERPL-MCNC: 7.3 G/DL (ref 6.1–8.1)
QUEST EGFR AFRICAN AMERICAN: 109 ML/MIN/1.73M2
QUEST EGFR NON-AFR. AMERICAN: 94 ML/MIN/1.73M2
RBC # BLD AUTO: 4.99 MILLION/UL (ref 3.8–5.1)
SODIUM SERPL-SCNC: 138 MMOL/L (ref 135–146)
TRIGL SERPL-MCNC: 121 MG/DL
TSH SERPL-ACNC: 2.87 MIU/L
WBC # BLD AUTO: 9.7 THOUSAND/UL (ref 3.8–10.8)

## 2022-01-31 ENCOUNTER — OFFICE VISIT (OUTPATIENT)
Dept: INTERNAL MEDICINE | Facility: CLINIC | Age: 52
End: 2022-01-31
Payer: COMMERCIAL

## 2022-01-31 VITALS
WEIGHT: 161 LBS | SYSTOLIC BLOOD PRESSURE: 120 MMHG | DIASTOLIC BLOOD PRESSURE: 74 MMHG | HEART RATE: 85 BPM | RESPIRATION RATE: 16 BRPM | TEMPERATURE: 98.1 F | HEIGHT: 62 IN | BODY MASS INDEX: 29.63 KG/M2 | OXYGEN SATURATION: 99 %

## 2022-01-31 DIAGNOSIS — F32.A DEPRESSION, UNSPECIFIED DEPRESSION TYPE: ICD-10-CM

## 2022-01-31 DIAGNOSIS — Z00.00 ENCOUNTER FOR ANNUAL PHYSICAL EXAM: Primary | ICD-10-CM

## 2022-01-31 DIAGNOSIS — H69.92 EUSTACHIAN TUBE DYSFUNCTION, LEFT: ICD-10-CM

## 2022-01-31 DIAGNOSIS — N28.89 URETEROCELE: ICD-10-CM

## 2022-01-31 DIAGNOSIS — R10.2 PELVIC PAIN IN FEMALE: ICD-10-CM

## 2022-01-31 DIAGNOSIS — E78.5 HYPERLIPIDEMIA, UNSPECIFIED HYPERLIPIDEMIA TYPE: ICD-10-CM

## 2022-01-31 DIAGNOSIS — I10 ESSENTIAL HYPERTENSION, BENIGN: ICD-10-CM

## 2022-01-31 PROCEDURE — 99396 PREV VISIT EST AGE 40-64: CPT | Performed by: INTERNAL MEDICINE

## 2022-01-31 PROCEDURE — 3008F BODY MASS INDEX DOCD: CPT | Performed by: INTERNAL MEDICINE

## 2022-01-31 ASSESSMENT — ENCOUNTER SYMPTOMS
ABDOMINAL PAIN: 0
HEADACHES: 0
JOINT SWELLING: 0
PALPITATIONS: 0
FATIGUE: 0
DYSPHORIC MOOD: 0
NECK PAIN: 0
SEIZURES: 0
DYSURIA: 0
ARTHRALGIAS: 1
FEVER: 0
BRUISES/BLEEDS EASILY: 0
NUMBNESS: 1
WEAKNESS: 0
CONSTIPATION: 0
UNEXPECTED WEIGHT CHANGE: 0
BACK PAIN: 0
SINUS PRESSURE: 0
SLEEP DISTURBANCE: 0
COUGH: 0
DIFFICULTY URINATING: 0
FREQUENCY: 0
SORE THROAT: 0
CHILLS: 0
SHORTNESS OF BREATH: 0
DIARRHEA: 0
DIZZINESS: 1

## 2022-02-01 NOTE — ASSESSMENT & PLAN NOTE
Effusions in both middle ears.  Increase fluid intake. Use saline nasal spray. Can try getting back on oral allergy med. Continue flonase/astelin. ENT was recommending surgery if no improvement.

## 2022-02-01 NOTE — ASSESSMENT & PLAN NOTE
Reviewed labs. All normal. UTD with mammogram, pap smear and colonoscopy. UTD with all vaccines.  Advised to get back to yoga regularly. Referred to [facility] and [facility] to help with weight management

## 2022-02-01 NOTE — ASSESSMENT & PLAN NOTE
Improved with better bowel regimen.  Incidental finding of ureterocele.  Advised to make appt with urologist for evaluation

## 2022-02-01 NOTE — ASSESSMENT & PLAN NOTE
Near R.UVJ, seen on CT.  Did not see urologist since pain improved. Advised to make an appt for evaluation to discuss possible work up or intervention

## 2022-02-09 ENCOUNTER — TELEPHONE (OUTPATIENT)
Dept: INTERNAL MEDICINE | Facility: CLINIC | Age: 52
End: 2022-02-09
Payer: COMMERCIAL

## 2022-02-09 DIAGNOSIS — F32.A DEPRESSION, UNSPECIFIED DEPRESSION TYPE: ICD-10-CM

## 2022-02-09 DIAGNOSIS — I10 ESSENTIAL HYPERTENSION, BENIGN: ICD-10-CM

## 2022-02-09 DIAGNOSIS — J30.9 ALLERGIC RHINITIS, UNSPECIFIED SEASONALITY, UNSPECIFIED TRIGGER: ICD-10-CM

## 2022-02-09 NOTE — TELEPHONE ENCOUNTER
The patient called stating that she will need refills of her scripts and left them on the prescription line.  The patient recently changed her insurance and her mail order pharmacy changed from [facility] to [facility].  I removed [facility] in her chart and added [facility].

## 2022-02-09 NOTE — TELEPHONE ENCOUNTER
Pt has new mail order pharma;[facility].    All meds need new Rx sent over.            Medicine Refill Request    Last Office: 1/31/2022   Last Consult Visit: Visit date not found  Last Telemedicine Visit: Visit date not found    Next Appointment: Visit date not found      Current Outpatient Medications:   •  atorvastatin (LIPITOR) 20 mg tablet, Take 1 tablet (20 mg total) by mouth daily., Disp: 90 tablet, Rfl: 3  •  azelastine 137 mcg (0.1 %) nasal spray, Administer 1 spray into each nostril 2 (two) times a day. Use in each nostril as directed., Disp: 180 mL, Rfl: 3  •  buPROPion XL (WELLBUTRIN XL) 300 mg 24 hr tablet, Take 1 tablet (300 mg total) by mouth daily., Disp: 90 tablet, Rfl: 3  •  crisaborole (EUCRISA) 2 % ointment, Eucrisa 2 % topical ointment, Disp: , Rfl:   •  fluticasone propionate 50 mcg/actuation nasal spray, Administer 1 spray into each nostril 2 (two) times a day. With Astelin, Disp: 16 g, Rfl: 3  •  hydrochlorothiazide (HYDRODIURIL) 25 mg tablet, Take 1 tablet (25 mg total) by mouth daily., Disp: 90 tablet, Rfl: 3  •  losartan (COZAAR) 50 mg tablet, Take 1 tablet (50 mg total) by mouth daily., Disp: 90 tablet, Rfl: 3  •  montelukast (SINGULAIR) 10 mg tablet, Take 1 tablet (10 mg total) by mouth once daily., Disp: 90 tablet, Rfl: 3  •  spironolactone (ALDACTONE) 100 mg tablet, Take 1 tablet (100 mg total) by mouth daily., Disp: 90 tablet, Rfl: 3      BP Readings from Last 3 Encounters:   01/31/22 120/74   12/21/21 114/74   10/12/21 122/82       Recent Lab results:  Lab Results   Component Value Date    CHOL 157 01/26/2022   ,   Lab Results   Component Value Date    HDL 58 01/26/2022   ,   Lab Results   Component Value Date    LDLCALC 79 01/26/2022   ,   Lab Results   Component Value Date    TRIG 121 01/26/2022        Lab Results   Component Value Date    GLUCOSE 85 01/26/2022   , No results found for: HGBA1C      Lab Results   Component Value Date    CREATININE 0.74 01/26/2022        Lab Results   Component Value Date    TSH 2.87 01/26/2022

## 2022-02-10 RX ORDER — AZELASTINE 1 MG/ML
1 SPRAY, METERED NASAL 2 TIMES DAILY
Qty: 180 ML | Refills: 3 | Status: SHIPPED | OUTPATIENT
Start: 2022-02-10 | End: 2023-03-06

## 2022-02-10 RX ORDER — MONTELUKAST SODIUM 10 MG/1
10 TABLET ORAL
Qty: 90 TABLET | Refills: 3 | Status: SHIPPED | OUTPATIENT
Start: 2022-02-10 | End: 2023-03-06 | Stop reason: ALTCHOICE

## 2022-02-10 RX ORDER — ATORVASTATIN CALCIUM 20 MG/1
20 TABLET, FILM COATED ORAL DAILY
Qty: 90 TABLET | Refills: 3 | Status: SHIPPED | OUTPATIENT
Start: 2022-02-10 | End: 2022-03-29

## 2022-02-10 RX ORDER — LOSARTAN POTASSIUM 50 MG/1
50 TABLET ORAL DAILY
Qty: 90 TABLET | Refills: 3 | Status: SHIPPED | OUTPATIENT
Start: 2022-02-10 | End: 2023-01-05 | Stop reason: SDUPTHER

## 2022-02-10 RX ORDER — FLUTICASONE PROPIONATE 50 MCG
1 SPRAY, SUSPENSION (ML) NASAL 2 TIMES DAILY
Qty: 16 G | Refills: 3 | Status: SHIPPED | OUTPATIENT
Start: 2022-02-10 | End: 2023-03-06

## 2022-02-10 RX ORDER — SPIRONOLACTONE 100 MG/1
100 TABLET, FILM COATED ORAL DAILY
Qty: 90 TABLET | Refills: 3 | Status: SHIPPED | OUTPATIENT
Start: 2022-02-10 | End: 2022-09-01

## 2022-02-10 RX ORDER — HYDROCHLOROTHIAZIDE 25 MG/1
25 TABLET ORAL DAILY
Qty: 90 TABLET | Refills: 3 | Status: SHIPPED | OUTPATIENT
Start: 2022-02-10 | End: 2022-03-29

## 2022-02-10 RX ORDER — BUPROPION HYDROCHLORIDE 300 MG/1
300 TABLET ORAL DAILY
Qty: 90 TABLET | Refills: 3 | Status: SHIPPED | OUTPATIENT
Start: 2022-02-10 | End: 2022-05-26

## 2022-02-13 NOTE — TELEPHONE ENCOUNTER
Notified patient that script was sent to pharmacy.  
Patient called back, her insurance will cover generic flonase and azelastine spray.  
Sent.  
The patient called and LVM on answering service at lunch time that the nasal spray sent in for her today is not covered by her insurance. Dymista is what was called in today.  The patient asked that we call her to discuss alternate nasal spray.  I called the patient back and LVM if she can ask her pharmacy or insurance company what is covered and Dr Guardado can send that into her pharmacy.    
Review of Systems  •	CONSTITUTIONAL - no  fever, no diaphoresis, no weight change  •	SKIN - no rash  •	HEMATOLOGIC - no bleeding, no bruising  •	EYES - no eye pain, no blurred vision  •	ENT - no change in hearing, no pain  •	RESPIRATORY - no shortness of breath, no cough  •	CARDIAC - (+) chest pain, no palpitations  •	GI - (+) epigastic abd pain, no nausea, no vomiting, no diarrhea, no constipation, no bleeding  •	GENITO-URINARY - no discharge, no dysuria; no hematuria,   •	ENDO - no polydipsia, no polyuria, no heat/no cold intolerance  •	MUSCULOSKELETAL - no joint pain, no swelling, no redness  •	NEUROLOGIC - no weakness, no headache, no anesthesia, no paresthesias  •	PSYCH - no anxiety, non suicidal, non homicidal, no hallucination, no depression

## 2022-03-29 ENCOUNTER — OFFICE VISIT (OUTPATIENT)
Dept: INTERNAL MEDICINE | Facility: CLINIC | Age: 52
End: 2022-03-29
Payer: COMMERCIAL

## 2022-03-29 VITALS
OXYGEN SATURATION: 98 % | DIASTOLIC BLOOD PRESSURE: 60 MMHG | WEIGHT: 164 LBS | RESPIRATION RATE: 16 BRPM | HEIGHT: 62 IN | TEMPERATURE: 98.5 F | HEART RATE: 78 BPM | BODY MASS INDEX: 30.18 KG/M2 | SYSTOLIC BLOOD PRESSURE: 102 MMHG

## 2022-03-29 DIAGNOSIS — E78.5 HYPERLIPIDEMIA, UNSPECIFIED HYPERLIPIDEMIA TYPE: ICD-10-CM

## 2022-03-29 DIAGNOSIS — H69.93 DYSFUNCTION OF BOTH EUSTACHIAN TUBES: ICD-10-CM

## 2022-03-29 DIAGNOSIS — L65.9 THINNING HAIR: Primary | ICD-10-CM

## 2022-03-29 DIAGNOSIS — I10 ESSENTIAL HYPERTENSION, BENIGN: ICD-10-CM

## 2022-03-29 PROCEDURE — 99214 OFFICE O/P EST MOD 30 MIN: CPT | Performed by: INTERNAL MEDICINE

## 2022-03-29 PROCEDURE — 3008F BODY MASS INDEX DOCD: CPT | Performed by: INTERNAL MEDICINE

## 2022-03-29 RX ORDER — HYDROCHLOROTHIAZIDE 25 MG/1
12.5 TABLET ORAL DAILY
Qty: 90 TABLET | Refills: 3 | COMMUNITY
Start: 2022-03-29 | End: 2023-03-30

## 2022-03-29 RX ORDER — ROSUVASTATIN CALCIUM 5 MG/1
5 TABLET, COATED ORAL DAILY
Qty: 90 TABLET | Refills: 3 | Status: SHIPPED | OUTPATIENT
Start: 2022-03-29 | End: 2023-03-30

## 2022-03-29 ASSESSMENT — ENCOUNTER SYMPTOMS
UNEXPECTED WEIGHT CHANGE: 0
WEAKNESS: 0
HEADACHES: 1
FEVER: 0
ROS SKIN COMMENTS: HAIR LOSS
SHORTNESS OF BREATH: 0
DIFFICULTY URINATING: 0
ABDOMINAL PAIN: 0
RHINORRHEA: 0

## 2022-03-29 NOTE — ASSESSMENT & PLAN NOTE
Feels the atorvastatin may be contributing to hair loss.  Will switch patient to rosuvastatin. Taking biotin and hair and nail supplement.  Will work on stress management. Referred to in house counselor.

## 2022-03-29 NOTE — ASSESSMENT & PLAN NOTE
Continue flonase and astelin. Advised to use it twice a day.  Increase hydration. Get adequate rest. Work on stress management.

## 2022-03-29 NOTE — PROGRESS NOTES
Subjective      Patient ID: Aurora Cedillo is a 51 y.o. female.    HPI    Patient presents with c/o thinning hair. Concerned it could be due to the atorvastatin.  Also under a lot of stress. Helping taking care of father and 3 special needs children.  Had lost weight, but plateau'ed.  Had an appt with [facility] but couldn't follow the recommendation of keeping a food diary.  Has not made an appt with the [facility] yet.  Feeling overwhelmed by the stress at times.  Looking for a therapist.  Having persistent eustachian tube dysfunction symptoms and pain and discomfort in the temples.  Improves with ibuprofen.  Trying to drink enough water. Takes Claritin and Singulair everyday. Using Astelin and Flonase at night.  Taking biotin and a hair/nail supplement.  No other new/acute complaints.     The following have been reviewed and updated as appropriate in this visit:     Allergies  Meds  Problems           Past Medical History:   Diagnosis Date   • Allergic rhinitis    • BMI 40.0-44.9, adult (CMS/[facility])    • Depression 2012   • Dysfunction of both eustachian tubes    • Essential hypertension, benign 2012   • Hyperlipidemia    • Right bundle branch block 2012   • Sensorineural hearing loss, bilateral    • Shingles     on head, age 20's   • Varicose veins of lower extremity    • Vitamin D deficiency      Past Surgical History:   Procedure Laterality Date   • PARTIAL KNEE ARTHROPLASTY Left      Family History   Problem Relation Age of Onset   • Atrial fibrillation Biological Mother    • Atrial fibrillation Biological Father    • Stroke Biological Father         multiple. memory issues, paranoia etc.    • Atrial fibrillation Biological Brother    • Stroke Biological Brother          of CVA age 52   • ADD / ADHD Biological Daughter    • Anxiety disorder Biological Daughter    • No Known Problems Biological Son    • Autism spectrum disorder Biological Son         high functioning   • ADD / ADHD  Biological Son         oldest, declines meds     Social History     Socioeconomic History   • Marital status:      Spouse name: None   • Number of children: 3   • Years of education: None   • Highest education level: None   Occupational History   • Occupation: [employer]   Tobacco Use   • Smoking status: Former Smoker   • Smokeless tobacco: Never Used   Vaping Use   • Vaping Use: Never used   Substance and Sexual Activity   • Alcohol use: Yes   • Sexual activity: Yes     Partners: Male   Social History Narrative    Children- 2 sons (16, 10), 1 daughter (13). 9/2021    Caffeine - iced tea    Exercise- yoga - will get back to it    Diet-     Pets-     [employer] affiliation-            Review of Systems   Constitutional: Negative for fever and unexpected weight change.   HENT: Positive for ear pain. Negative for congestion, postnasal drip, rhinorrhea and tinnitus.    Respiratory: Negative for shortness of breath.    Cardiovascular: Negative for chest pain.   Gastrointestinal: Negative for abdominal pain.   Genitourinary: Negative for difficulty urinating.   Skin:        Hair loss   Allergic/Immunologic: Positive for environmental allergies.   Neurological: Positive for headaches (temples). Negative for weakness.       Allergies   Allergen Reactions   • Neomycin Hives   • Neomycin-Bacitracnzn-Polymyxnb Itching   • Penicillins    • Latex Rash   • Miconazole Rash     Current Outpatient Medications   Medication Sig Dispense Refill   • azelastine (ASTELIN) 137 mcg (0.1 %) nasal spray Administer 1 spray into each nostril 2 (two) times a day. Use in each nostril as directed. 180 mL 3   • buPROPion XL (WELLBUTRIN XL) 300 mg 24 hr tablet Take 1 tablet (300 mg total) by mouth daily. 90 tablet 3   • crisaborole (EUCRISA) 2 % ointment Eucrisa 2 % topical ointment     • fluticasone propionate (FLONASE) 50 mcg/actuation nasal spray Administer 1 spray into each nostril 2 (two) times a day. With Astelin 16 g 3   • hydrochlorothiazide  "(HYDRODIURIL) 25 mg tablet Take 0.5 tablets (12.5 mg total) by mouth daily. 90 tablet 3   • losartan (COZAAR) 50 mg tablet Take 1 tablet (50 mg total) by mouth daily. 90 tablet 3   • montelukast (SINGULAIR) 10 mg tablet Take 1 tablet (10 mg total) by mouth once daily. 90 tablet 3   • rosuvastatin (CRESTOR) 5 mg tablet Take 1 tablet (5 mg total) by mouth daily. 90 tablet 3   • spironolactone (ALDACTONE) 100 mg tablet Take 1 tablet (100 mg total) by mouth daily. 90 tablet 3     No current facility-administered medications for this visit.       Objective   Vitals:    03/29/22 1427   BP: 102/60   Pulse: 78   Resp: 16   Temp: 36.9 °C (98.5 °F)   SpO2: 98%   Weight: 74.4 kg (164 lb)   Height: 1.575 m (5' 2\")     Body mass index is 30 kg/m².    Physical Exam  Constitutional:       Appearance: Normal appearance. She is well-developed.   HENT:      Head: Normocephalic and atraumatic.      Right Ear: Hearing and ear canal normal. A middle ear effusion is present.      Left Ear: Hearing and ear canal normal. A middle ear effusion (with air bubbles) is present.   Eyes:      General: Lids are normal.   Cardiovascular:      Rate and Rhythm: Normal rate and regular rhythm.      Heart sounds: Normal heart sounds, S1 normal and S2 normal. No murmur heard.  Pulmonary:      Effort: Pulmonary effort is normal.      Breath sounds: Normal breath sounds. No decreased breath sounds, rhonchi or rales.   Abdominal:      General: Bowel sounds are normal.      Palpations: Abdomen is soft.      Tenderness: There is no abdominal tenderness.   Musculoskeletal:      Cervical back: Neck supple.   Skin:     General: Skin is warm and dry.   Neurological:      Mental Status: She is alert and oriented to person, place, and time.      Cranial Nerves: No cranial nerve deficit.      Gait: Gait normal.   Psychiatric:         Behavior: Behavior normal.         Thought Content: Thought content normal.         Judgment: Judgment normal. "         Assessment/Plan   Problem List Items Addressed This Visit        Nervous    Dysfunction of both eustachian tubes     Continue flonase and astelin. Advised to use it twice a day.  Increase hydration. Get adequate rest. Work on stress management.              Circulatory    Essential hypertension, benign     Well controlled on losartan 50mg and HCTZ 12.5mg daily.             Relevant Medications    hydrochlorothiazide (HYDRODIURIL) 25 mg tablet       Endocrine/Metabolic    Hyperlipidemia     At goal with atorvastatin but pt concerned about side effects. Has been on statin therapy for many years. Would like to continue given  FHx of CVA, though likely from afib.  Will switch to rosuvastatin.           Relevant Medications    rosuvastatin (CRESTOR) 5 mg tablet       Other    Thinning hair - Primary     Feels the atorvastatin may be contributing to hair loss.  Will switch patient to rosuvastatin. Taking biotin and hair and nail supplement.  Will work on stress management. Referred to in house counselor.                  Caren Guardado MD    3/29/2022

## 2022-03-29 NOTE — ASSESSMENT & PLAN NOTE
At goal with atorvastatin but pt concerned about side effects. Has been on statin therapy for many years. Would like to continue given  FHx of CVA, though likely from afib.  Will switch to rosuvastatin.

## 2022-04-01 ENCOUNTER — OFFICE VISIT (OUTPATIENT)
Dept: BEHAVIORAL HEALTH | Facility: CLINIC | Age: 52
End: 2022-04-01
Payer: COMMERCIAL

## 2022-04-01 DIAGNOSIS — F32.89 OTHER SPECIFIED DEPRESSIVE EPISODES: ICD-10-CM

## 2022-04-01 DIAGNOSIS — F41.1 GENERALIZED ANXIETY DISORDER: Primary | ICD-10-CM

## 2022-04-01 PROCEDURE — 90791 PSYCH DIAGNOSTIC EVALUATION: CPT

## 2022-04-01 ASSESSMENT — COGNITIVE AND FUNCTIONAL STATUS - GENERAL
THOUGHT_PROCESS: WNL
ORIENTATION: FULLY ORIENTED
AFFECT: FULL RANGE
RECENT MEMORY: WNL
AROUSAL LEVEL: ALERT
SPEECH: REGULAR
CONCENTRATION: WNL
LIBIDO: NO CHANGE
EYE_CONTACT: WNL
INSIGHT: INTACT
MOOD: EUTHYMIC (NORMAL)
APPEARANCE: WELL GROOMED
APPETITE: NO CHANGE
ATTENTION: WNL
REMOTE MEMORY: WNL
PERCEPTUAL FUNCTION: NORMAL
PSYCHOMOTOR FUNCTIONING: WNL
THOUGHT_CONTENT: APPROPRIATE
DELUSIONS: NONE OR AGE APPROPRIATE
SLEEP_WAKE_CYCLE: NO CHANGE
IMPULSE CONTROL: INTACT

## 2022-04-01 NOTE — PROGRESS NOTES
Integrated Behavioral Health Outpatient Initial Visit- In office  Visit Type Performed: In-person    Aurora Cedillo, a 51 y.o. female, presented today for an initial behavioral health evaluation visit.  Clinician confirmed identification of patient by name and birth date.    Informed Consent/Confidentiality:   Pt was explained the model of primary care behavioral health we provide at [facility], including how the psychologist/licensed behavioral health provider collaborates regularly with the pt’s PCP regarding the pt’s treatment. The integrated behavioral health progress notes are visible to the physicians and advanced practitioners in the practice where the pt is being seen. The visit diagnosis and appointment/scheduling information is visible to the patient's EPIC chart, to provide continuity of care across the [facility] system. The pt will also have access to view their notes via [employer] (pt portal).  This is a low-intensity model of care (we provide 8-10 visits of cognitive-behavioral therapy), and the patient has the right to other options of behavioral health care that are indicated for more severe conditions (ie: Traditional Outpatient psychotherapy, Intensive Outpatient Programs, Partial Hospitalization Programs, and Inpatient services).  Also discussed were confidentiality and the limits of confidentiality (ie: if pt is at imminent risk of suicide, homicide, or reports child abuse/neglect, providers may need to break confidentiality to ensure the safety of the pt or to follow mandated reporting requirements).   Pt expressed understanding and consent: Yes      SUBJECTIVE     Reason for visit: Initial evaluation for stress     History of Behavioral Health Treatment  Previous treatment: JORDAN started Prozac in college. She has been off and on medication but mostly on since being  and having children. She was in therapy in her 20s   Previously experienced symptoms of depression which she says she has had most of  her adult life. She also experiences anxiety.  Other pertinent behavioral health history: Mom has bad anxiety, father drank a lot,  Brother possibly ADHD, Daughter has ADHD and anxiety disorder, youngest son has Autism and oldest son has ADHD     Substance Use History  ETOH/alcohol use: occasional, social use  Other substance or supplement use: drugs: denied.; tobacco: denied; caffeine: coffee 1 cup /day and tea iced tea occasionally  /day    Social History  Important people in pt's life/Support network: good support system including  and youngest son's wraparound since he was 2 years old    Lives with  and three children (16 m, 13 f and 11 m)  Cultural practices/Religous/Spiritual beliefs pertinent to treatment :Christianity    Patient-Identified Strengths caregiver, optimistic, kind   Hobbies/Interests: loves music, going to shows, playing scrabble   Additional pertinent historical information includes: JORDAN is currently not working although she would like to for fulfillment/sense of purpose     Symptoms  Depression symptoms:   PHQ 9:  Over the last 2 weeks, how often have you been bothered by the following problems?     Little Interest or Pleasure in Doing Things 0-->not at all   Feeling Down, Depressed or Hopeless 0-->not at all   Trouble Falling or Staying Asleep, or Sleeping Too Much 0-->not at all   Feeling Tired or Having Little Energy 1-->several daysFeeling Tired or Having Little Energy. 1-->several days. The comment is feel flat and lack of activity. Taken on 4/1/22 0940   Poor Appetite or Overeating 0-->not at all   Feeling Bad about Yourself - or that You are a Failure or Have Let Yourself or Your Family Down 1-->several daysFeeling Bad about Yourself - or that You are a Failure or Have Let Yourself or Your Family Down. 1-->several days. The comment is feeling of failure when she cant get tasks done. Taken on 4/1/22 0940   Trouble Concentrating on Things, Such as Reading the Newspaper or  Watching Television 0-->not at all   Moving or Speaking So Slowly that Other People Could Have Noticed? Or the Opposite - Being So Fidgety 0-->not at all   Thoughts that You Would be Better Off Dead or of Hurting Yourself in Some Way 0-->not at all   PHQ-9: Brief Depression Severity Measure Score 2   If You Checked Off Any Problems, How Difficult Have These Problems Made It For You to Do Your Work, Take Care of Things at Home, or Get Along with Other People? somewhat difficult     Anxiety symptoms:   ORION-7  Generalized Anxiety Disorder 7     Feeling nervous, anxious or on edge 1-->Several days   Not being able to stop or control worrying 0-->Not at all   Worrying too much about different things 1-->Several days   Trouble relaxing 0-->Not at all   Being so restless that it is hard to sit still 0-->Not at all   Becoming easily annoyed or irritable 1-->Several daysBecoming easily annoyed or irritable. 1-->Several days. The comment is more prevalent during her menstrual cycle. Taken on 4/1/22 0928   Feeling afraid as if something awful might happen 1-->Several days   GAD7 Total Score:  4   If you checked off any problems, how difficult have these made it for you to do your work, take care of things at home, or get along with other people? Somewhat difficult       OBJECTIVE     Mental Status Exam  Appearance: Well Groomed  Speech: Regular  Psychomotor Functioning: WNL  Eye Contact: WNL  Orientation: Fully oriented  Attention: WNL  Concentration: WNL  Recent Memory: WNL  Remote Memory: WNL  Thought Content: Appropriate  Thought Process: WNL  Insight: Intact  Perceptual Function: Normal  Delusions: None or age appropriate  Sleeping: No Change  Appetite: No Change  Libido: No change  Affect: Full Range  Mood: Euthymic (normal)      ASSESSMENT     Psychotropic medications: no known adherence challenges, effective   MJ currently takes medication as prescribed and has seen improvement   Current Outpatient Medications    Medication Sig Dispense Refill   • azelastine (ASTELIN) 137 mcg (0.1 %) nasal spray Administer 1 spray into each nostril 2 (two) times a day. Use in each nostril as directed. 180 mL 3   • buPROPion XL (WELLBUTRIN XL) 300 mg 24 hr tablet Take 1 tablet (300 mg total) by mouth daily. 90 tablet 3   • crisaborole (EUCRISA) 2 % ointment Eucrisa 2 % topical ointment     • fluticasone propionate (FLONASE) 50 mcg/actuation nasal spray Administer 1 spray into each nostril 2 (two) times a day. With Astelin 16 g 3   • hydrochlorothiazide (HYDRODIURIL) 25 mg tablet Take 0.5 tablets (12.5 mg total) by mouth daily. 90 tablet 3   • losartan (COZAAR) 50 mg tablet Take 1 tablet (50 mg total) by mouth daily. 90 tablet 3   • montelukast (SINGULAIR) 10 mg tablet Take 1 tablet (10 mg total) by mouth once daily. 90 tablet 3   • rosuvastatin (CRESTOR) 5 mg tablet Take 1 tablet (5 mg total) by mouth daily. 90 tablet 3   • spironolactone (ALDACTONE) 100 mg tablet Take 1 tablet (100 mg total) by mouth daily. 90 tablet 3     No current facility-administered medications for this visit.     Suicidal Ideation/Homicidal Ideation Risk Assessment  Risk Factors: Family history of suicide  and Loss (relational, social, work, or financial) daughter has attempted, brother  a few years ago, friend Niko  (13 years ago)  Protective factors: Effective and accessible mental health care , Connectedness to individuals, family, community, and social institutions and Problem-solving and conflict resolution skills    Suicidal Ideation: Not Present, No intention or plan.  Self Injurious Behavior:  Not Present, No intention or plan.  Homicidal Behavior: Not Present, No intention or plan.  Estimate of Current Risk: Minimal risk    Plan for Safety-   N/A: risk is assessed to be minimal, therefore developing a safety plan is not indicated at this time.      Screening measures administered during this visit  PHQ-9: Brief Depression Severity Measure Score:  2  GAD7 Total Score: : 4      ASSESSMENT / IMPRESSIONS  Aurora Cedillo seems to be experiencing depression and anxiety. She has a history of depression since she was in college and symptoms have varied over the years with medical concerns for herself, her  and her father, loss of her brother and having three children with special needs. She experiences worry, nervousness, fear of something bad happening and feels down when desired tasks are not completed. She also experiences irritability which she attributes to her menstrual cycle and lack of energy which she associates with not being physically active. She appears to meet the criteria for Generalized Anxiety Disorder and Other specified depressive episodes. Cognitive Behavioral Therapy may be beneficial in the development of coping skills to help better manage symptoms   Severity: moderate, chronicity: chronic, duration:Late teens/early 20s  Associated factors include health concerns, father has mental health concerns, children with special needs, strained relationship with her mother  Prognostic protective factors include positive social supports and openness to treatment. Prognostic risk factors include wanting personal fulfillment separate from her being a parent .      PLAN      Goals     •  To improve motivation to accomplish goals and not feel so bad about things she cannot control (pt-stated)          Interventions: Introduced and explained the cognitive model and the connection between thoughts, feelings and behaviors. We also discussed her cognitive distortions (all or nothing) and demand based language having an impact on her progress with health/fitness goals    Recommendations for treatment: Individual Therapy, 30 minutes 2-4 times monthly    Recommendations for Interventions: Behavior Management, Cognitive Behavior Therapy, Goal Setting and Insight Development    Next visit plan  Continue to monitor symptoms, further discuss cognitive model  and progress with behavioral changes from homework assigned

## 2022-04-22 ENCOUNTER — OFFICE VISIT (OUTPATIENT)
Dept: BEHAVIORAL HEALTH | Facility: CLINIC | Age: 52
End: 2022-04-22
Payer: COMMERCIAL

## 2022-04-22 DIAGNOSIS — F41.1 GENERALIZED ANXIETY DISORDER: Primary | ICD-10-CM

## 2022-04-22 PROCEDURE — 90832 PSYTX W PT 30 MINUTES: CPT

## 2022-04-22 NOTE — PROGRESS NOTES
Integrated Behavioral Health Follow-up Office Visit Note  Visit number: 2  Visit Type Performed: in-person (in office)      Duration: 32 min  Aurora Cedillo presented today for a behavioral health visit.    SUBJECTIVE     Symptoms  Depression symptoms: lack of motivation and feelings of worthlessness/guilt (disappointed with self about progress towards fitness goals)  Anxiety symptoms: moderate anxiety/worry      OBJECTIVE     Mental Status Evaluation  Patient's mood and affect were consistent with the context, and consistent with their baseline: Yes   Comments:  calm and pleasant, awake and alert; oriented to person, place, and time      Suicidal Ideation/Homicidal Ideation Risk Assessment: not assessed. If not assessed, reason:  Assessment of SI/HI is not indicated at this time, based on prior assessment (pt denied SI/HI at previous session, pt presents with no significant risk factors, and pt has protective factors)  Risk Factors: Family history of suicide  and Loss (relational, social, work, or financial) daughter has attempted, brother  a few years ago, friend Niko  (13 years ago)  Protective factors: Effective and accessible mental health care , Connectedness to individuals, family, community, and social institutions and Problem-solving and conflict resolution skills     Estimate of Current Risk: Minimal risk     Plan for Safety-   N/A: risk is assessed to be minimal, therefore developing a safety plan is not indicated at this time.     Interventions  Cognitive Behavior Therapy  We discussed cognitive distortions due to her all of nothing thought process related to her fitness goals and disqualifying the positives when she has made what she would view at limited progress. We also discussed demand based language and the idea of her wanting to work out instead of her feeling that she should       Psychotropic medications: no known adherence challenges, effective   MJ mentioned feeling that the  medication could be increased but also suggested waiting to see how an increase in her physical activity may be helpful to her mental health    Current Outpatient Medications   Medication Sig Dispense Refill   • azelastine (ASTELIN) 137 mcg (0.1 %) nasal spray Administer 1 spray into each nostril 2 (two) times a day. Use in each nostril as directed. 180 mL 3   • buPROPion XL (WELLBUTRIN XL) 300 mg 24 hr tablet Take 1 tablet (300 mg total) by mouth daily. 90 tablet 3   • crisaborole (EUCRISA) 2 % ointment Eucrisa 2 % topical ointment     • fluticasone propionate (FLONASE) 50 mcg/actuation nasal spray Administer 1 spray into each nostril 2 (two) times a day. With Astelin 16 g 3   • hydrochlorothiazide (HYDRODIURIL) 25 mg tablet Take 0.5 tablets (12.5 mg total) by mouth daily. 90 tablet 3   • losartan (COZAAR) 50 mg tablet Take 1 tablet (50 mg total) by mouth daily. 90 tablet 3   • montelukast (SINGULAIR) 10 mg tablet Take 1 tablet (10 mg total) by mouth once daily. 90 tablet 3   • rosuvastatin (CRESTOR) 5 mg tablet Take 1 tablet (5 mg total) by mouth daily. 90 tablet 3   • spironolactone (ALDACTONE) 100 mg tablet Take 1 tablet (100 mg total) by mouth daily. 90 tablet 3     No current facility-administered medications for this visit.       ASSESSMENT       Progress  Patient's progress toward their goals is generally Improving  Patient's symptomology is somewhat improving. JORDAN expresses that she does not feel depressed or sad but she is disappointed with herself and meeting her fitness goals. She does not feel motivated especially with previous weight loss plateau and current weight. She has been intentional about consuming the goal of nearly a half gallon of water with the goal of reaching a gallon. JORDAN has enrolled in a hot yoga which will be Thursday mornings. She is hoping the scheduled time will be helpful with her accountability and to also add another day for yoga at home       PLAN      Goals     •  To improve  motivation to accomplish goals and not feel so bad about things she cannot control (pt-stated)           Recommendations  Individual Therapy  30 minutes 2-4 times monthly    Next visit plan:  Continue to monitor symptoms, further discuss progress towards fitness goals and the impact that her negative thinking may be having on her goals

## 2022-04-22 NOTE — PATIENT INSTRUCTIONS
Do the hot yoga at the [facility] 1x week - eventually add is one additional yoga at home   Have 1/2 gallon of water daily   Utilize exercise tracker (including type of exercise, amount of time, mood before and mood after)

## 2022-05-05 ENCOUNTER — APPOINTMENT (RX ONLY)
Dept: URBAN - METROPOLITAN AREA CLINIC 374 | Facility: CLINIC | Age: 52
Setting detail: DERMATOLOGY
End: 2022-05-05

## 2022-05-05 DIAGNOSIS — L65.0 TELOGEN EFFLUVIUM: ICD-10-CM

## 2022-05-05 PROCEDURE — ? ORDER TESTS

## 2022-05-05 PROCEDURE — ? COUNSELING

## 2022-05-05 PROCEDURE — ? DEFER

## 2022-05-05 PROCEDURE — ? ADDITIONAL NOTES

## 2022-05-05 PROCEDURE — ? PRESCRIPTION MEDICATION MANAGEMENT

## 2022-05-05 PROCEDURE — 99214 OFFICE O/P EST MOD 30 MIN: CPT

## 2022-05-05 ASSESSMENT — LOCATION ZONE DERM: LOCATION ZONE: SCALP

## 2022-05-05 ASSESSMENT — LOCATION SIMPLE DESCRIPTION DERM
LOCATION SIMPLE: LEFT SCALP
LOCATION SIMPLE: POSTERIOR SCALP

## 2022-05-05 ASSESSMENT — LOCATION DETAILED DESCRIPTION DERM
LOCATION DETAILED: POSTERIOR MID-PARIETAL SCALP
LOCATION DETAILED: LEFT MEDIAL FRONTAL SCALP

## 2022-05-05 NOTE — PROCEDURE: ADDITIONAL NOTES
Detail Level: Zone
Additional Notes: Patient very against using topical rogaine at this time
Render Risk Assessment In Note?: yes

## 2022-05-05 NOTE — PROCEDURE: PRESCRIPTION MEDICATION MANAGEMENT
Render In Strict Bullet Format?: No
Detail Level: Zone
Continue Regimen: spironolactone 100mg tablet: take 1 tab po QHS

## 2022-05-05 NOTE — PROCEDURE: ORDER TESTS
Lab Facility: [id number]
Bill For Surgical Tray: no
Performing Laboratory: [id number]
Expected Date Of Service: 05/05/2022
Billing Type: Third-Party Bill

## 2022-05-06 ENCOUNTER — OFFICE VISIT (OUTPATIENT)
Dept: BEHAVIORAL HEALTH | Facility: CLINIC | Age: 52
End: 2022-05-06
Payer: COMMERCIAL

## 2022-05-06 DIAGNOSIS — F41.1 GENERALIZED ANXIETY DISORDER: Primary | ICD-10-CM

## 2022-05-06 PROCEDURE — 90832 PSYTX W PT 30 MINUTES: CPT

## 2022-05-06 NOTE — PROGRESS NOTES
"Integrated Behavioral Health Follow-up Office Visit Note  Visit number: 3  Visit Type Performed: in-person (in office)    Duration: 36 min  Aurora Cedillo presented today for a behavioral health visit.    SUBJECTIVE     Symptoms  Depression symptoms: sadness and feelings of worthlessness/guilt   Anxiety symptoms: excessive anxiety/worry and difficulty controlling worry    OBJECTIVE     Mental Status Evaluation  Patient's mood and affect were consistent with the context, and consistent with their baseline: Yes   Comments:  calm and pleasant, awake and alert; oriented to person, place, and time    Suicidal Ideation/Homicidal Ideation Risk Assessment: not assessed. If not assessed, reason:  Assessment of SI/HI is not indicated at this time, based on prior assessment (pt denied SI/HI at previous session, pt presents with no significant risk factors, and pt has protective factors)  Risk Factors: Family history of suicide  and Loss (relational, social, work, or financial) daughter has attempted, brother  a few years ago, friend Niko  (13 years ago)  Protective factors: Effective and accessible mental health care , Connectedness to individuals, family, community, and social institutions and Problem-solving and conflict resolution skills     Estimate of Current Risk: Minimal risk     Plan for Safety-   N/A: risk is assessed to be minimal, therefore developing a safety plan is not indicated at this time.     Interventions  Cognitive Behavior Therapy  We discussed not disqualifying the positives due to minimizing the progress that she has had thus far because of what has not been accomplished yet. We also discussed demand based vs preference based language because of feelings associated with \"should haves\" and \"need tos\" particularly as it relates to care for her father and health matters. Communication of needs and expectations to receive support with household tasks and responsibilities were also " discussed    Psychotropic medications: no known adherence challenges, somewhat effective   JORDAN is inquiring about medication options due to continued feelings of being overwhelmed and lacking motivation. She is inquiring about the possibility of the Wellbutrin being increased vs adding another medication being added such as Abilify.      Current Outpatient Medications   Medication Sig Dispense Refill   • azelastine (ASTELIN) 137 mcg (0.1 %) nasal spray Administer 1 spray into each nostril 2 (two) times a day. Use in each nostril as directed. 180 mL 3   • buPROPion XL (WELLBUTRIN XL) 300 mg 24 hr tablet Take 1 tablet (300 mg total) by mouth daily. 90 tablet 3   • crisaborole (EUCRISA) 2 % ointment Eucrisa 2 % topical ointment     • fluticasone propionate (FLONASE) 50 mcg/actuation nasal spray Administer 1 spray into each nostril 2 (two) times a day. With Astelin 16 g 3   • hydrochlorothiazide (HYDRODIURIL) 25 mg tablet Take 0.5 tablets (12.5 mg total) by mouth daily. 90 tablet 3   • losartan (COZAAR) 50 mg tablet Take 1 tablet (50 mg total) by mouth daily. 90 tablet 3   • montelukast (SINGULAIR) 10 mg tablet Take 1 tablet (10 mg total) by mouth once daily. 90 tablet 3   • rosuvastatin (CRESTOR) 5 mg tablet Take 1 tablet (5 mg total) by mouth daily. 90 tablet 3   • spironolactone (ALDACTONE) 100 mg tablet Take 1 tablet (100 mg total) by mouth daily. 90 tablet 3     No current facility-administered medications for this visit.       ASSESSMENT       Progress  Patient's progress toward their goals is generally Improving  Patient's symptomology is gradually improving JORDAN reports that she has made progress with consuming more water and getting physical activities with hot yoga and yoga at home. She has struggled with tracking her activities but plans to do so for the next session. She does acknowledge her hair loss is a major source of stress and sadness and has plans for further medical testing to explore next options for  treatment. She also continues to worry about the care of her father and associated feelings of guilt related to ways in which she can contribute. She finds positives in looking forward to planning events with her family       PLAN      Goals     •  To improve motivation to accomplish goals and not feel so bad about things she cannot control (pt-stated)           Recommendations  Individual Therapy  30 minutes 1-2 times monthly    Next visit plan:  Continue to monitor symptoms and progress with level of motivation and physical health goals

## 2022-05-06 NOTE — Clinical Note
Good Morning Dr. Guardado, I met with MJ today and she scheduled an appt with you. She is inquiring about the possibility of the Wellbutrin being increased vs adding another medication such as Abilify because she is still having symptoms. Thank you

## 2022-05-06 NOTE — PATIENT INSTRUCTIONS
Continue to working on consuming water 1/2 gallon  Continue to participate in yoga at home and in class   Aim to get in one physical outside activity (walking or hiking)

## 2022-05-25 ENCOUNTER — TELEMEDICINE (OUTPATIENT)
Dept: BARIATRICS | Facility: CLINIC | Age: 52
End: 2022-05-25
Payer: COMMERCIAL

## 2022-05-25 VITALS — BODY MASS INDEX: 30.36 KG/M2 | WEIGHT: 165 LBS | HEIGHT: 62 IN

## 2022-05-25 DIAGNOSIS — Z98.84 STATUS POST BARIATRIC SURGERY: Primary | ICD-10-CM

## 2022-05-25 PROCEDURE — 99202 OFFICE O/P NEW SF 15 MIN: CPT | Mod: 95 | Performed by: NURSE PRACTITIONER

## 2022-05-25 PROCEDURE — 3008F BODY MASS INDEX DOCD: CPT | Performed by: NURSE PRACTITIONER

## 2022-05-25 ASSESSMENT — ENCOUNTER SYMPTOMS
ENDOCRINE NEGATIVE: 1
HEMATOLOGIC/LYMPHATIC NEGATIVE: 1
NEUROLOGICAL NEGATIVE: 1
PSYCHIATRIC NEGATIVE: 1
GASTROINTESTINAL NEGATIVE: 1
CONSTITUTIONAL NEGATIVE: 1
RESPIRATORY NEGATIVE: 1
BACK PAIN: 1
CARDIOVASCULAR NEGATIVE: 1

## 2022-05-25 NOTE — PROGRESS NOTES
Verification of Patient Location:  The patient affirms they are currently located in the following state: [place]    Request for Consent:   Audio and Video Encounter   Avinash, my name is ISRRAEL Laboy.  Before we proceed, can you please verify your identification by telling me your full name and date of birth?  Can you tell me who is in the room with you?    You and I are about to have a telemedicine check-in or visit because you have requested it.  This is a live video-conference.  I am a real person, speaking to you in real time.  There is no one else with me on the video-conference.  However, when we use ([employer], etc) it is important for you to know that the video-conference may not be secure or private.  I am not recording this conversation and I am asking you not to record it.  This telemedicine visit will be billed to your health insurance or you, if you are self-insured.  You understand you will be responsible for any copayments or coinsurances that apply to your telemedicine visit.  Communication platform used for this encounter:  [employer] Video Visit (with Zoom integration)     Before starting our telemedicine visit, I am required to get your consent for this virtual check-in or visit by telemedicine. Do you consent?      Patient Response to Request for Consent:  Yes      Visit Documentation:      Bariatric Revision      Patient ID: Aurora Cedillo                              : 1970  MRN: [id number]                                          PCP: Caren Guardado MD  Visit Date: 2022      Subjective   History of Present Illness:    Aurora Cedillo is a 51 y.o. female. Aurora May had been severely obese throughout their life and had previously undergone a bariatric procedure. Aurora May had Laparoscopic Adjustable Gastric Band performed by Dr. Mirza about 2007 at [facility].    Their highest weight before surgery was 240 lbs. Their lowest weight is unknown - she  "thinks 150 lbs. She is interested in Bariatric Follow Up-Transfer of care.    Op note in       Aurora May initial point contact was word of mouth      Their Carlton Sleepiness score is 6.    Pertinent PMH includes the following :GERD - just with certain foods like tomatoes, orange juice. She doesn't take medication for it.     She had fills for a couple of years at [facility] and then had stopped fills. She went back a few years ago but they wouldn't do a fill because they said they were taking bands out.     She started losing some weight a few years ago again (from 205 lbs) with tracking her weight with a program at work. She changed jobs and her insurance changed so she was no longer doing that program.     She wants to lose more weight because her dad had 3 strokes and afib, her brother 3-4 years ago had a stroke and afib and passed away. She is motivated to \"stay alive\" and be healthy.     She has an appointment in November with [facility].      Past Medical History: She has a past medical history of Allergic rhinitis, Anxiety (5 years ago), Back pain (Lower back 15-20 years ago), BMI 40.0-44.9, adult (CMS/[facility]), Depression (06/28/2012), Dysfunction of both eustachian tubes, Essential hypertension, benign (06/28/2012), GERD (gastroesophageal reflux disease) (After lap band), Gout (15-20 years ago), High cholesterol (20 years ago), Hyperlipidemia, Plantar fasciitis (20 years ago), Right bundle branch block (06/28/2012), Sensorineural hearing loss, bilateral, Shingles, Skin abnormalities (All my life), Varicose veins of lower extremity, and Vitamin D deficiency.  Past Surgical History: She has a past surgical history that includes Partial knee arthroplasty (Left); Carpal tunnel release (25 years ago); Knee arthroscopy (15 years ago); Knee surgery (7 years ago); and Laparoscopic gastric banding (15 years ago?).  Medication:   Allergies: She is allergic to neomycin, neomycin-bacitracnzn-polymyxnb, penicillins, latex, and " "miconazole.    Nutrition:  The patient has failed multiple attempts at weight loss through diet and exercise and has been unsuccessful with weight loss on their own.  The largest amount of weight lost was 50  lbs with previous weight loss surgery.  Since that time, the patient has regained their weight. Patient has not attempted to lose weight with diet pills.  Aurora May's food preferences include \"too much fat and sugar\".   See dietician documentation for complete history.    Exercise:  Aurora May does not have ambulatory physical limitations.  Aurora May reports they exercise 3x/week  She is biking, walking and doing yoga      Social History:   Patient's social history reviewed.   Social History     Tobacco Use   Smoking Status Former Smoker   • Packs/day: 0.50   • Years: 5.00   • Pack years: 2.50   • Start date: 1988   • Quit date: 1993   • Years since quittin.4   Smokeless Tobacco Never Used     Social History     Substance and Sexual Activity   Alcohol Use Yes    Comment: Varies, drink on occasion wine, beer or vodka 3 x month     Social History     Substance and Sexual Activity   Drug Use Not Currently       Review of Systems:  Review of Systems   Constitutional: Negative.    Respiratory: Negative.    Cardiovascular: Negative.    Gastrointestinal: Negative.    Endocrine: Negative.    Musculoskeletal: Positive for back pain.   Skin: Negative.    Neurological: Negative.    Hematological: Negative.    Psychiatric/Behavioral: Negative.        Vitals:  Her height is 1.575 m (5' 2\") and weight is 74.8 kg (165 lb).  body mass index is 30.18 kg/m².  Weight trend:   Wt Readings from Last 5 Encounters:   22 74.8 kg (165 lb)   22 74.4 kg (164 lb)   22 73 kg (161 lb)   21 73.9 kg (163 lb)   10/12/21 72.1 kg (159 lb)           Patient Active Problem List   Diagnosis   • Acne   • Allergic rhinitis   • Chronic pansinusitis   • Depression   • Deviated nasal septum   • Dysfunction of both " eustachian tubes   • Essential hypertension, benign   • External thrombosed hemorrhoids   • Grade II internal hemorrhoids   • Herpes labialis   • Hyperlipidemia   • Hypertrophy of nasal turbinates   • Knee injury   • Thinning hair   • Obesity   • Right bundle branch block   • Sensorineural hearing loss, bilateral   • Snoring   • Varicose veins of lower extremity   • Vitamin D deficiency   • Weight loss   • Routine lab draw   • Eustachian tube dysfunction, left   • Acute nonintractable headache   • Left chronic serous otitis media   • Pelvic pain in female   • Encounter for annual physical exam   • BMI 29.0-29.9,adult   • Ureterocele       Assessment/Plan     We discussed her options regarding band fills or the medical program.     She will need lab work and an UGI Xray to make sure the band is in a good position if she would like to do band fills. If appropriate, her first band fill will be under fluoroscopy with Dr Saravia then subsequent fills will be in office.     She will need to see the clinicians as well if she decides to move forward.    We will check her insurance and make sure she has coverage. She would like to talk to her  first.     She will keep her appt with Dr Macdonald in November.     No orders of the defined types were placed in this encounter.      I spent 25 minutes on this date of service performing the following activities: obtaining history, entering orders, documenting, preparing for visit, obtaining / reviewing records, providing counseling and education and coordinating care.     ISRRAEL Laboy 5/25/2022

## 2022-05-26 ENCOUNTER — OFFICE VISIT (OUTPATIENT)
Dept: INTERNAL MEDICINE | Facility: CLINIC | Age: 52
End: 2022-05-26
Payer: COMMERCIAL

## 2022-05-26 VITALS
TEMPERATURE: 98.7 F | WEIGHT: 169 LBS | DIASTOLIC BLOOD PRESSURE: 80 MMHG | SYSTOLIC BLOOD PRESSURE: 112 MMHG | HEIGHT: 62 IN | OXYGEN SATURATION: 98 % | HEART RATE: 89 BPM | BODY MASS INDEX: 31.1 KG/M2

## 2022-05-26 DIAGNOSIS — H69.93 DYSFUNCTION OF BOTH EUSTACHIAN TUBES: ICD-10-CM

## 2022-05-26 DIAGNOSIS — F32.89 OTHER DEPRESSION: Primary | ICD-10-CM

## 2022-05-26 PROCEDURE — 3008F BODY MASS INDEX DOCD: CPT | Performed by: INTERNAL MEDICINE

## 2022-05-26 PROCEDURE — 99214 OFFICE O/P EST MOD 30 MIN: CPT | Performed by: INTERNAL MEDICINE

## 2022-05-26 RX ORDER — LORATADINE 10 MG/1
10 TABLET ORAL DAILY
COMMUNITY
End: 2024-10-11

## 2022-05-26 RX ORDER — BUPROPION HYDROCHLORIDE 450 MG/1
450 TABLET, FILM COATED, EXTENDED RELEASE ORAL DAILY
Qty: 90 TABLET | Refills: 1 | Status: SHIPPED | OUTPATIENT
Start: 2022-05-26 | End: 2022-06-07

## 2022-05-26 NOTE — PROGRESS NOTES
Subjective      Patient ID: Aurora Cedillo is a 51 y.o. female.    HPI    Patient presents for follow-up.  Taking the Wellbutrin 300 mg daily.  Feels it is helping with not gaining more weight.  Does not feel depressed or anxious, but still under significant stress taking care of her father and her special needs children.  Started counseling with Evi Yip LCSW. Hair loss is persistent.  Will continue to follow up with [facility] in [place]. Having worsening R.Eustachian tube dysfunction symptoms.  Taking Sudafed helps.  Also using Astelin and Flonase twice a day.  Takes Claritin and Singulair daily.  Has not seen an allergist recently.  Had testing in the past and was told she is not allergic to anything.  Has been seeing bariatric surgery for follow-up.  [facility] but could not get [facility] appointment until November.  No other new/acute concerns.      The following have been reviewed and updated as appropriate in this visit:     Allergies  Meds  Problems           Past Medical History:   Diagnosis Date   • Allergic rhinitis    • Anxiety 5 years ago   • Back pain Lower back 15-20 years ago   • BMI 40.0-44.9, adult (CMS/HCC)    • Depression 06/28/2012   • Dysfunction of both eustachian tubes    • Essential hypertension, benign 06/28/2012   • GERD (gastroesophageal reflux disease) After lap band   • Gout 15-20 years ago    No longer have   • High cholesterol 20 years ago   • Hyperlipidemia    • Plantar fasciitis 20 years ago   • Right bundle branch block 06/28/2012   • Sensorineural hearing loss, bilateral    • Shingles     on head, age 20's   • Skin abnormalities All my life    Several skin issues some related to allergies, stress   • Varicose veins of lower extremity    • Vitamin D deficiency      Past Surgical History:   Procedure Laterality Date   • CARPAL TUNNEL RELEASE  25 years ago    Corrected with surgery   • KNEE ARTHROSCOPY  15 years ago   • KNEE SURGERY  7 years ago   •  LAPAROSCOPIC GASTRIC BANDING  15 years ago?    Unsure of exact timeframe   • PARTIAL KNEE ARTHROPLASTY Left      Family History   Problem Relation Age of Onset   • Atrial fibrillation Biological Mother    • Mental illness Biological Mother    • Atrial fibrillation Biological Father    • Stroke Biological Father    • Heart disease Biological Father    • Hypertension Biological Father    • Mental illness Biological Father    • Obesity Biological Father    • Atrial fibrillation Biological Brother    • Stroke Biological Brother          of CVA age 52   • ADD / ADHD Biological Daughter    • Anxiety disorder Biological Daughter    • No Known Problems Biological Son    • Autism spectrum disorder Biological Son         high functioning   • ADD / ADHD Biological Son         oldest, declines meds     Social History     Socioeconomic History   • Marital status:    • Number of children: 3   Occupational History   • Occupation: [employer]   Tobacco Use   • Smoking status: Former Smoker     Packs/day: 0.50     Years: 5.00     Pack years: 2.50     Start date: 1988     Quit date: 1993     Years since quittin.4   • Smokeless tobacco: Never Used   Vaping Use   • Vaping Use: Never used   Substance and Sexual Activity   • Alcohol use: Yes     Comment: Varies, drink on occasion wine, beer or vodka 3 x month   • Drug use: Not Currently   • Sexual activity: Yes     Partners: Male     Birth control/protection: Abstinence, Coitus interruptus/Pulling Out, Condom Male     Comment: Unable to take pill due to side effects.   Social History Narrative    Children- 2 sons (16, 10), 1 daughter (13). 2021    Caffeine - iced tea    Exercise- yoga - will get back to it    Diet-     Pets-     Orthodoxy affiliation-            Review of Systems   Constitutional: Negative for fever and unexpected weight change.   HENT: Positive for congestion and ear pain.    Respiratory: Negative for shortness of breath.    Cardiovascular: Negative  "for chest pain.   Gastrointestinal: Negative for abdominal pain.   Genitourinary: Negative for difficulty urinating.   Neurological: Negative for headaches.   Psychiatric/Behavioral: Positive for sleep disturbance. Negative for dysphoric mood. The patient is not nervous/anxious.         Stress       Allergies   Allergen Reactions   • Neomycin Hives   • Neomycin-Bacitracnzn-Polymyxnb Itching   • Penicillins    • Latex Rash   • Miconazole Rash     Current Outpatient Medications   Medication Sig Dispense Refill   • azelastine (ASTELIN) 137 mcg (0.1 %) nasal spray Administer 1 spray into each nostril 2 (two) times a day. Use in each nostril as directed. 180 mL 3   • buPROPion XL (FORFIVO XL) 450 mg 24 hr tablet Take 1 tablet (450 mg total) by mouth daily. 90 tablet 1   • crisaborole (EUCRISA) 2 % ointment Eucrisa 2 % topical ointment     • hydrochlorothiazide (HYDRODIURIL) 25 mg tablet Take 0.5 tablets (12.5 mg total) by mouth daily. 90 tablet 3   • loratadine (CLARITIN) 10 mg tablet Take 10 mg by mouth daily.     • losartan (COZAAR) 50 mg tablet Take 1 tablet (50 mg total) by mouth daily. 90 tablet 3   • montelukast (SINGULAIR) 10 mg tablet Take 1 tablet (10 mg total) by mouth once daily. 90 tablet 3   • rosuvastatin (CRESTOR) 5 mg tablet Take 1 tablet (5 mg total) by mouth daily. 90 tablet 3   • spironolactone (ALDACTONE) 100 mg tablet Take 1 tablet (100 mg total) by mouth daily. 90 tablet 3   • fluticasone propionate (FLONASE) 50 mcg/actuation nasal spray Administer 1 spray into each nostril 2 (two) times a day. With Astelin 16 g 3     No current facility-administered medications for this visit.       Objective   Vitals:    05/26/22 1330   BP: 112/80   BP Location: Right upper arm   Patient Position: Sitting   Pulse: 89   Temp: 37.1 °C (98.7 °F)   SpO2: 98%   Weight: 76.7 kg (169 lb)   Height: 1.575 m (5' 2\")     Body mass index is 30.91 kg/m².    Physical Exam  Constitutional:       Appearance: Normal appearance. " She is well-developed.   HENT:      Head: Normocephalic and atraumatic.      Right Ear: Hearing, ear canal and external ear normal. A middle ear effusion is present.      Left Ear: Hearing, ear canal and external ear normal. A middle ear effusion is present.      Nose: Nose normal.      Mouth/Throat:      Pharynx: Uvula midline.   Eyes:      General: Lids are normal.   Cardiovascular:      Rate and Rhythm: Normal rate and regular rhythm.      Heart sounds: Normal heart sounds, S1 normal and S2 normal. No murmur heard.  Pulmonary:      Effort: Pulmonary effort is normal. No respiratory distress.      Breath sounds: Normal breath sounds. No decreased breath sounds, rhonchi or rales.   Abdominal:      General: Bowel sounds are normal.      Palpations: Abdomen is soft.      Tenderness: There is no abdominal tenderness.   Musculoskeletal:      Cervical back: Normal range of motion and neck supple.   Skin:     General: Skin is warm and dry.   Neurological:      Mental Status: She is alert and oriented to person, place, and time.      Cranial Nerves: No cranial nerve deficit.      Gait: Gait normal.   Psychiatric:         Behavior: Behavior normal.         Thought Content: Thought content normal.         Judgment: Judgment normal.         Assessment/Plan   Problem List Items Addressed This Visit        Nervous    Dysfunction of both eustachian tubes     Continue the Astelin, Flonase, Singulair, Claritin.  Can add plenty of saline nasal spray.  Increase hydration significantly           Relevant Medications    loratadine (CLARITIN) 10 mg tablet       Other    Depression - Primary     Patient's symptoms are overall improved but still under significant stress.  Feels she may benefit from medication adjustment.  Continue counseling.  Discussed switching medication versus increasing dose.  We will give a trial with increasing the Wellbutrin XL to 450 mg daily.  If no significant benefit, will decrease back to 300 mg and  consider the addition of venlafaxine           Relevant Medications    loratadine (CLARITIN) 10 mg tablet    buPROPion XL (FORFIVO XL) 450 mg 24 hr tablet          Caren Guardado MD    5/28/2022

## 2022-05-28 ASSESSMENT — ENCOUNTER SYMPTOMS
NERVOUS/ANXIOUS: 0
FEVER: 0
DIFFICULTY URINATING: 0
SLEEP DISTURBANCE: 1
UNEXPECTED WEIGHT CHANGE: 0
SHORTNESS OF BREATH: 0
ABDOMINAL PAIN: 0
HEADACHES: 0
DYSPHORIC MOOD: 0

## 2022-05-28 NOTE — ASSESSMENT & PLAN NOTE
Continue the Astelin, Flonase, Singulair, Claritin.  Can add plenty of saline nasal spray.  Increase hydration significantly

## 2022-05-28 NOTE — ASSESSMENT & PLAN NOTE
Patient's symptoms are overall improved but still under significant stress.  Feels she may benefit from medication adjustment.  Continue counseling.  Discussed switching medication versus increasing dose.  We will give a trial with increasing the Wellbutrin XL to 450 mg daily.  If no significant benefit, will decrease back to 300 mg and consider the addition of venlafaxine

## 2022-06-07 ENCOUNTER — TELEPHONE (OUTPATIENT)
Dept: INTERNAL MEDICINE | Facility: CLINIC | Age: 52
End: 2022-06-07
Payer: COMMERCIAL

## 2022-06-07 RX ORDER — BUPROPION HYDROCHLORIDE 300 MG/1
300 TABLET ORAL DAILY
Qty: 90 TABLET | Refills: 3 | Status: SHIPPED | OUTPATIENT
Start: 2022-06-07 | End: 2022-06-13 | Stop reason: SDUPTHER

## 2022-06-07 RX ORDER — BUPROPION HYDROCHLORIDE 150 MG/1
150 TABLET ORAL DAILY
Qty: 90 TABLET | Refills: 3 | Status: SHIPPED | OUTPATIENT
Start: 2022-06-07 | End: 2022-06-13 | Stop reason: SDUPTHER

## 2022-06-07 NOTE — TELEPHONE ENCOUNTER
----- Message from Treesa Gregory MA sent at 6/6/2022 12:23 PM EDT -----  Regarding: FW: 450 mg Bupropion     ----- Message -----  From: Aurora Cedillo  Sent: 6/6/2022  11:46 AM EDT  To: [facility] Clinical Support P  Subject: 450 mg Bupropion                                 On my last visit my Bupropion was upped from 300 to 450. My pharmacy said insurance wasn’t covering the 450. Would you need to submitted a preauthorization for it to be approved? Can it be prescribed in two pills a 300 and a 150? Should I contact the insurance company? Let me know how to proceed.   Thank you.  JORDAN Cedillo

## 2022-06-13 ENCOUNTER — TELEPHONE (OUTPATIENT)
Dept: INTERNAL MEDICINE | Facility: CLINIC | Age: 52
End: 2022-06-13
Payer: COMMERCIAL

## 2022-06-13 RX ORDER — BUPROPION HYDROCHLORIDE 150 MG/1
150 TABLET ORAL DAILY
Qty: 90 TABLET | Refills: 3 | Status: SHIPPED | OUTPATIENT
Start: 2022-06-13 | End: 2023-09-18

## 2022-06-13 RX ORDER — BUPROPION HYDROCHLORIDE 300 MG/1
300 TABLET ORAL DAILY
Qty: 90 TABLET | Refills: 3 | Status: SHIPPED | OUTPATIENT
Start: 2022-06-13 | End: 2023-09-18

## 2022-06-13 NOTE — TELEPHONE ENCOUNTER
----- Message from Mayra Galeas MA sent at 6/13/2022  2:30 PM EDT -----  Regarding: FW: 450 mg Bupropion     ----- Message -----  From: Aurora May Mamadou  Sent: 6/13/2022   1:56 PM EDT  To: [facility] Clinical Support P  Subject: 450 mg Bupropion                                 I was unable to get the 450 xr Buproprion approved at [facility]. So, they filled a 150 and 300 instead, which meant a double monthly copay. ISo, I wanted to see if you would be able to send in the bupropion prescription to mail order, so this doesn’t continue. I am starting the new dosage today, since it took so long to get approved. Please let me know if that is okay. Thanks!

## 2022-07-28 ENCOUNTER — OFFICE VISIT (OUTPATIENT)
Dept: INTERNAL MEDICINE | Facility: CLINIC | Age: 52
End: 2022-07-28
Payer: COMMERCIAL

## 2022-07-28 VITALS
DIASTOLIC BLOOD PRESSURE: 72 MMHG | RESPIRATION RATE: 16 BRPM | HEIGHT: 62 IN | SYSTOLIC BLOOD PRESSURE: 114 MMHG | HEART RATE: 77 BPM | TEMPERATURE: 98.5 F | OXYGEN SATURATION: 98 % | WEIGHT: 169 LBS | BODY MASS INDEX: 31.1 KG/M2

## 2022-07-28 DIAGNOSIS — F32.89 OTHER DEPRESSION: ICD-10-CM

## 2022-07-28 DIAGNOSIS — H90.5 SENSORINEURAL HEARING LOSS (SNHL) OF RIGHT EAR, UNSPECIFIED HEARING STATUS ON CONTRALATERAL SIDE: Primary | ICD-10-CM

## 2022-07-28 DIAGNOSIS — H69.93 DYSFUNCTION OF BOTH EUSTACHIAN TUBES: ICD-10-CM

## 2022-07-28 PROCEDURE — 99213 OFFICE O/P EST LOW 20 MIN: CPT | Performed by: INTERNAL MEDICINE

## 2022-07-28 PROCEDURE — 3008F BODY MASS INDEX DOCD: CPT | Performed by: INTERNAL MEDICINE

## 2022-07-28 RX ORDER — PREDNISONE 20 MG/1
TABLET ORAL 3 TIMES DAILY
COMMUNITY
Start: 2022-07-26 | End: 2022-08-23 | Stop reason: ALTCHOICE

## 2022-07-28 ASSESSMENT — ENCOUNTER SYMPTOMS
NERVOUS/ANXIOUS: 0
DIFFICULTY URINATING: 0
SHORTNESS OF BREATH: 0
ABDOMINAL PAIN: 0
UNEXPECTED WEIGHT CHANGE: 0
FEVER: 0
DYSPHORIC MOOD: 0

## 2022-07-28 NOTE — ASSESSMENT & PLAN NOTE
Sudden onset.  Has labs and MRI ordered.  Currently on high-dose steroids which she will taper after 10 days.  Symptoms are improved.  Continue follow-up with ENT

## 2022-07-28 NOTE — PROGRESS NOTES
Subjective      Patient ID: Aurora Cedillo is a 51 y.o. female.    HPI    Patient presents for 4-week follow-up.  Her Wellbutrin dose was increased from 300 to 450 mg.  Feels better on it.  Able to manage stress better.  Saw ENT on 7/25/2022 and was diagnosed with sensorineural hearing loss and sudden onset hearing loss and eustachian tube dysfunction.  Was started on high-dose prednisone for 10 days then quick taper over the following 6 days.  The tinnitus symptoms have improved within a day or so.  Wondering whether she needs to continue the prednisone.  Has MRI and an inner ear antibody to schedule.  We will follow-up afterward.  [place]  Was able to move up her appointment with Dr. Yasemin Macdonald to next month.  No other new/acute concerns.    The following have been reviewed and updated as appropriate in this visit:     Allergies  Meds  Problems           Past Medical History:   Diagnosis Date   • Allergic rhinitis    • Anxiety 5 years ago   • Back pain    • BMI 40.0-44.9, adult (CMS/[facility])    • Depression 06/28/2012   • Dysfunction of both eustachian tubes    • Essential hypertension, benign 06/28/2012   • GERD (gastroesophageal reflux disease) After lap band   • Gout 15-20 years ago    No longer have   • High cholesterol 20 years ago   • Hyperlipidemia    • Plantar fasciitis 20 years ago   • Right bundle branch block 06/28/2012   • Sensorineural hearing loss, bilateral    • Shingles     on head, age 20's   • Skin abnormalities All my life    Several skin issues some related to allergies, stress   • Varicose veins of lower extremity    • Vitamin D deficiency      Past Surgical History:   Procedure Laterality Date   • CARPAL TUNNEL RELEASE  25 years ago    Corrected with surgery   • KNEE ARTHROSCOPY  15 years ago   • KNEE SURGERY  7 years ago   • LAPAROSCOPIC GASTRIC BANDING  15 years ago?    Unsure of exact timeframe   • PARTIAL KNEE ARTHROPLASTY Left      Family  History   Problem Relation Age of Onset   • Atrial fibrillation Biological Mother    • Mental illness Biological Mother    • Atrial fibrillation Biological Father    • Stroke Biological Father    • Heart disease Biological Father    • Hypertension Biological Father    • Mental illness Biological Father    • Obesity Biological Father    • Atrial fibrillation Biological Brother    • Stroke Biological Brother          of CVA age 52   • ADD / ADHD Biological Daughter    • Anxiety disorder Biological Daughter    • No Known Problems Biological Son    • Autism spectrum disorder Biological Son         high functioning   • ADD / ADHD Biological Son         oldest, declines meds     Social History     Socioeconomic History   • Marital status:      Spouse name: None   • Number of children: 3   • Years of education: None   • Highest education level: None   Occupational History   • Occupation: [employer]   Tobacco Use   • Smoking status: Former Smoker     Packs/day: 0.50     Years: 5.00     Pack years: 2.50     Start date: 1988     Quit date: 1993     Years since quittin.5   • Smokeless tobacco: Never Used   Vaping Use   • Vaping Use: Never used   Substance and Sexual Activity   • Alcohol use: Yes     Comment: Varies, drink on occasion wine, beer or vodka 3 x month   • Drug use: Not Currently   • Sexual activity: Yes     Partners: Male     Birth control/protection: Abstinence, Coitus interruptus/Pulling Out, Condom Male     Comment: Unable to take pill due to side effects.   Social History Narrative    Children- 2 sons (16, 10), 1 daughter (13). 2021    Caffeine - iced tea    Exercise- yoga - will get back to it    Diet-     Pets-     Catholic affiliation-            Review of Systems   Constitutional: Negative for fever and unexpected weight change.   HENT: Positive for hearing loss (right side) and tinnitus.    Respiratory: Negative for shortness of breath.    Cardiovascular: Negative for chest pain.    Gastrointestinal: Negative for abdominal pain.   Genitourinary: Negative for difficulty urinating.   Psychiatric/Behavioral: Negative for dysphoric mood. The patient is not nervous/anxious.         Stress, lack of motivation       Allergies   Allergen Reactions   • Neomycin Hives   • Neomycin-Bacitracnzn-Polymyxnb Itching   • Penicillins    • Latex Rash   • Miconazole Rash     Current Outpatient Medications   Medication Sig Dispense Refill   • azelastine (ASTELIN) 137 mcg (0.1 %) nasal spray Administer 1 spray into each nostril 2 (two) times a day. Use in each nostril as directed. 180 mL 3   • buPROPion XL (WELLBUTRIN XL) 150 mg 24 hr tablet Take 1 tablet (150 mg total) by mouth daily. With 300mg tabs (total 450mg) 90 tablet 3   • buPROPion XL (WELLBUTRIN XL) 300 mg 24 hr tablet Take 1 tablet (300 mg total) by mouth daily. With 150mg tabs (total 450mg) 90 tablet 3   • crisaborole (EUCRISA) 2 % ointment Eucrisa 2 % topical ointment     • hydrochlorothiazide (HYDRODIURIL) 25 mg tablet Take 0.5 tablets (12.5 mg total) by mouth daily. 90 tablet 3   • loratadine (CLARITIN) 10 mg tablet Take 10 mg by mouth daily.     • losartan (COZAAR) 50 mg tablet Take 1 tablet (50 mg total) by mouth daily. 90 tablet 3   • montelukast (SINGULAIR) 10 mg tablet Take 1 tablet (10 mg total) by mouth once daily. 90 tablet 3   • predniSONE (DELTASONE) 20 mg tablet 3 (three) times a day.     • rosuvastatin (CRESTOR) 5 mg tablet Take 1 tablet (5 mg total) by mouth daily. 90 tablet 3   • spironolactone (ALDACTONE) 100 mg tablet Take 1 tablet (100 mg total) by mouth daily. 90 tablet 3   • fluticasone propionate (FLONASE) 50 mcg/actuation nasal spray Administer 1 spray into each nostril 2 (two) times a day. With Astelin 16 g 3     No current facility-administered medications for this visit.       Objective   Vitals:    07/28/22 1135   BP: 114/72   Pulse: 77   Resp: 16   Temp: 36.9 °C (98.5 °F)   SpO2: 98%   Weight: 76.7 kg (169 lb)   Height:  "1.575 m (5' 2\")     Body mass index is 30.91 kg/m².    Physical Exam  Constitutional:       Appearance: Normal appearance. She is well-developed.   HENT:      Head: Normocephalic and atraumatic.      Right Ear: Ear canal and external ear normal. A middle ear effusion is present.      Left Ear: Ear canal and external ear normal. A middle ear effusion is present.      Nose: Nose normal.      Mouth/Throat:      Pharynx: Uvula midline.   Eyes:      General: Lids are normal.   Cardiovascular:      Rate and Rhythm: Normal rate and regular rhythm.      Heart sounds: Normal heart sounds, S1 normal and S2 normal. No murmur heard.  Pulmonary:      Effort: Pulmonary effort is normal. No respiratory distress.      Breath sounds: Normal breath sounds. No decreased breath sounds, rhonchi or rales.   Musculoskeletal:      Cervical back: Normal range of motion and neck supple.   Skin:     General: Skin is warm and dry.   Neurological:      Mental Status: She is alert and oriented to person, place, and time.      Cranial Nerves: No cranial nerve deficit.      Gait: Gait normal.   Psychiatric:         Behavior: Behavior normal.         Thought Content: Thought content normal.         Judgment: Judgment normal.         Assessment/Plan   Problem List Items Addressed This Visit        Nervous    Dysfunction of both eustachian tubes     Continue all medications.  Follow-up closely with ENT           Sensorineural hearing loss (SNHL) of right ear - Primary     Sudden onset.  Has labs and MRI ordered.  Currently on high-dose steroids which she will taper after 10 days.  Symptoms are improved.  Continue follow-up with ENT              Other    Depression     Doing better on the combination of Wellbutrin XL 150mg and 300mg.  Continue counseling as needed                 Caren Guardado MD    7/28/2022  "

## 2022-08-17 RX ORDER — CHROMIUM PICOLINATE 200 MCG
TABLET ORAL
COMMUNITY
End: 2025-03-27

## 2022-08-17 RX ORDER — ATORVASTATIN CALCIUM 20 MG/1
20 TABLET, FILM COATED ORAL DAILY
COMMUNITY
End: 2022-08-23 | Stop reason: ALTCHOICE

## 2022-08-17 ASSESSMENT — ENCOUNTER SYMPTOMS
VOICE CHANGE: 0
NUMBNESS: 0
DIZZINESS: 1
BRUISES/BLEEDS EASILY: 0
DECREASED CONCENTRATION: 0
BACK PAIN: 1
TREMORS: 0
APNEA: 0
DIFFICULTY URINATING: 0
CHEST TIGHTNESS: 0
WHEEZING: 0
COLOR CHANGE: 0
TROUBLE SWALLOWING: 0
BLOOD IN STOOL: 0
ABDOMINAL DISTENTION: 0
WEAKNESS: 0
DYSURIA: 0
DYSPHORIC MOOD: 1
FLANK PAIN: 0
ARTHRALGIAS: 0
SHORTNESS OF BREATH: 0
HEADACHES: 0
SORE THROAT: 0
CONSTIPATION: 1
COUGH: 0
WOUND: 0
DIARRHEA: 0
JOINT SWELLING: 0
SEIZURES: 0
EYE ITCHING: 1
UNEXPECTED WEIGHT CHANGE: 0
FATIGUE: 1
POLYDIPSIA: 0
LIGHT-HEADEDNESS: 1
PALPITATIONS: 0
ACTIVITY CHANGE: 0
ABDOMINAL PAIN: 0
MYALGIAS: 0
SLEEP DISTURBANCE: 0
ADENOPATHY: 0
APPETITE CHANGE: 0
NERVOUS/ANXIOUS: 0

## 2022-08-17 NOTE — PROGRESS NOTES
Questions YES NO   1 During the last 3 months, did you have any episodes of excessive overeating (i.e. eating significantly more than what most people would eat in a similar period)?    x   NOTE: IF YOU ANSWERED “NO” TO QUESTION 1, YOU MAY STOP. THE REMAINING QUESTIONS DO NOT APPLY TO YOU

## 2022-08-17 NOTE — PROGRESS NOTES
"DETAILS OF CONSULTATION     Consulting Service:  [facility]    Referring Physician: Caren Guardado MD    Reason for Consultation: No chief complaint on file.       HISTORY OF PRESENT ILLNESS        Aurora Cedillo is a 51 y.o. female with hypertension, dyslipidemia, back pain, snoring, varicose veins, depression here to discuss how weight loss through optimized nutrition, physical activity, and behavior modification can improve weight related conditions.      Aurora Cedillo identifies a personal healthy weight as 150 pounds.     Motivation for appointment: Long term history of weight struggles.      Status of weight related conditions, relevant history, and new concerns:    Cardiovascular: Treated for blood pressure and cholesterol, after loosing 40 pounds, medications did not change.     Acne: On spironolactone, well controlled acne.    History of lap band: Avoid certain foods, especially grain products.    Musculoskeletal: Reports back pain, joint pain, and varicose veins    Psychiatric: History of depression, currently on bupropion 450 mg daily. This helps her with weight loss.     Asthma/allergy: Currently on montelukast, Astelin, and Claritin.  Also uses Flonase.    Potentially weight positive medications: Possibly spironolactone.    Lifetime weight trends:     Weight has been a problem her whole life, runs in the family on her dad's side. She \"looks\" like her aunt -- same build,larger in the butt an thighs.    Weight gain started before puberty -- in the hips and butt.   Prior to pregnancies, she was \"big but not in her stomach.      She had a larger weight gain in 1st pregnancy -- delivered at 260 pounds, with minimal success in weight loss after that pregnancy and had a knee issue and also low back pain.     She was always in gymnastics and dance.       She also had a partial knee replacement a few years ago.     Historically, but not recently - had heavy cycles "     Lifetime Weight History and Trends:   Previous weight loss strategies:  Optifast/Medifast and Weight Watchers  Previous weight loss medications:  None  Previous surgical interventions: lap band     Program Interests:  Nutrition counseling, weight loss medication, surgery, meal replacements and weight loss supplements    Goal/Healthy weight: 150 lbs  Onset of weight difficulty: childhood  Age and weight at first weight loss attempt: doctor prescribed a diet pill in adolescence  Weight trend in the past year: increasing  Lowest adult weight: 125 lb  Highest adult weight:250 lb  Childhood weight category: overweight  Most significant weight loss: 50 lbs due to medifast in 1990  Number of yo-yo dieting episodes: 6-9  Life events related to weight gained: pregnancy    Daily Routine and Food/Beverage Patterns:   Eating interval: 8 am to bedtime  Meal structure (planned meals/snack per day): 3 meals and 2 snacks per day  After dinner eating: every day  Night eating: no  Frequent snacking (>2): No   Number of meals per week not prepared at home: 5    Food tracking method: no  Current eating plan: none  Food Allergies/Intolerances: acidic foods  Person who does grocery shopping:self  Person who does meal preparation:self    24 hour recall:  Breakfast: scrambled eggs, 2 pieces, fruit salad  Lunch: san salad leftovers  Dinner: stuart wrapped chicken, mashed potatoes  Snacks: slushie, grapes    Daily water intake: 1 gallon  Daily coffee or tea intake: 1 cup of coffee, 2 cups of daily iced tea  Sweetened beverages:   Diet or artificial sweetened beverages:   Alcohol: occasional use    Number of vegetable servings per day: 2  Number of fruit servings per day: 4  Number of protein servings per day: 4  Number of starches/grains per day: 3  Number of processed foods per day: 2     Non Hunger and Disordered Eating:  Emotional eating: bored, irritable, tired, sad, stressed and celebrating  Mindless eating: stay awake,  procrastinate, socialize (parties/work events) and refuse not behaviors (available, free, offered, convenient)  Overeating:  No   Distracted eating while: cooking, reading, watching tv and computer/tablet  Hunger Patterns: Never hungry  Food cravings: yes chocolate, soup, water ice times per twice daily  Food Addiction: no  Binge Eating Screen: negative    Comments: see paperwork    Stressors:  Current level of life stress: moderate  Significant stressors: parent health / living situation, finances, children / spouse, weight gain  Rejuvenating activities: tv, swimming, yoga, walking  Rejuvenating activities frequency: weekly  Relaxation techniques: tv / movies, dates out with , vacations, naps    Physical Activity:  Activity tracker: no  Number of steps per day:   Activity level at work:  Activity level at home: moderately active  Hours of work screen:    Hours of leisure screen: 10-12 times / day   Current exercise: no  How often and type of exercise:   Barriers to physical activity: mental block to get back yoga    Sleep:  Average hours of sleep per night: 8 hours  Sleep is restful         I have reviewed the patient's past medical and surgical history, family history, psychiatric, and social history.     Pertinent negative history:   Patient denies history of anorexia, bulimia, addiction, ADHD, seizures, pancreatitis,  Coronary Artery Disease, valvular disease, cardiomyopathy, arrhythmias, chronic renal disease, nephrolithiasis, disorders of electrolyte imbalance    Pertinent positive history not already mentioned: None    See additional details from intake questionnaire in scanned documents under the media tab.     PAST MEDICAL AND SURGICAL HISTORY        Past Medical History:   Diagnosis Date   • Allergic rhinitis    • Anxiety 5 years ago   • Arthritis    • Back pain    • BMI 40.0-44.9, adult (CMS/[facility])    • Cellulitis    • Depression 06/28/2012   • Dysfunction of both eustachian tubes    • Essential  hypertension, benign 06/28/2012   • GERD (gastroesophageal reflux disease) After lap band   • Gout 15-20 years ago    No longer have   • High cholesterol 20 years ago   • Hyperlipidemia    • Irregular menses    • Plantar fasciitis 20 years ago   • Right bundle branch block 06/28/2012   • Sensorineural hearing loss, bilateral    • Shingles     on head, age 20's   • Sinusitis    • Skin abnormalities All my life    Several skin issues some related to allergies, stress   • Skin rash    • Sleep apnea    • Snoring    • UTI (urinary tract infection)    • Varicose veins of lower extremity    • Vitamin D deficiency    • Yeast infection        Past Surgical History:   Procedure Laterality Date   • CARPAL TUNNEL RELEASE  25 years ago    Corrected with surgery   • COLONOSCOPY     • KNEE ARTHROSCOPY  15 years ago   • KNEE SURGERY  7 years ago   • LAPAROSCOPIC GASTRIC BANDING  15 years ago?    Unsure of exact timeframe   • PARTIAL KNEE ARTHROPLASTY Left    • SINUS SURGERY     • VAGINAL DELIVERY         PCP: Caren Guardado MD    MEDICATIONS          Current Outpatient Medications:   •  azelastine (ASTELIN) 137 mcg (0.1 %) nasal spray, Administer 1 spray into each nostril 2 (two) times a day. Use in each nostril as directed., Disp: 180 mL, Rfl: 3  •  buPROPion XL (WELLBUTRIN XL) 150 mg 24 hr tablet, Take 1 tablet (150 mg total) by mouth daily. With 300mg tabs (total 450mg), Disp: 90 tablet, Rfl: 3  •  buPROPion XL (WELLBUTRIN XL) 300 mg 24 hr tablet, Take 1 tablet (300 mg total) by mouth daily. With 150mg tabs (total 450mg), Disp: 90 tablet, Rfl: 3  •  chromium picolinate 200 mcg tablet, Take by mouth., Disp: , Rfl:   •  crisaborole (EUCRISA) 2 % ointment, Eucrisa 2 % topical ointment, Disp: , Rfl:   •  diosmin complex no.1 (VASCULERA) 630 mg tablet, Take 1 tablet by mouth daily., Disp: 90 tablet, Rfl: 0  •  fluticasone propionate (FLONASE) 50 mcg/actuation nasal spray, Administer 1 spray into each nostril 2 (two) times a day.  With Astelin, Disp: 16 g, Rfl: 3  •  hydrochlorothiazide (HYDRODIURIL) 25 mg tablet, Take 0.5 tablets (12.5 mg total) by mouth daily., Disp: 90 tablet, Rfl: 3  •  ipratropium (ATROVENT) 42 mcg (0.06 %) nasal spray, SPRAY 1-2 SQUIRTS IN THE NOSTRILS THREE TIMES A DAY, Disp: , Rfl:   •  L. acidophilus/pectin, citrus (ACIDOPHILUS PROBIOTIC ORAL), Take 0.5 mg by mouth., Disp: , Rfl:   •  loratadine (CLARITIN) 10 mg tablet, Take 10 mg by mouth daily., Disp: , Rfl:   •  losartan (COZAAR) 50 mg tablet, Take 1 tablet (50 mg total) by mouth daily., Disp: 90 tablet, Rfl: 3  •  montelukast (SINGULAIR) 10 mg tablet, Take 1 tablet (10 mg total) by mouth once daily., Disp: 90 tablet, Rfl: 3  •  rosuvastatin (CRESTOR) 5 mg tablet, Take 1 tablet (5 mg total) by mouth daily., Disp: 90 tablet, Rfl: 3  •  spironolactone (ALDACTONE) 100 mg tablet, Take 1 tablet (100 mg total) by mouth daily., Disp: 90 tablet, Rfl: 3    ALLERGIES        Neomycin, Neomycin-bacitracnzn-polymyxnb, Penicillins, Latex, and Miconazole    FAMILY HISTORY        Family History   Problem Relation Age of Onset   • Atrial fibrillation Biological Mother    • Mental illness Biological Mother    • Atrial fibrillation Biological Father    • Stroke Biological Father    • Heart disease Biological Father    • Hypertension Biological Father    • Mental illness Biological Father    • Obesity Biological Father    • Atrial fibrillation Biological Brother    • Stroke Biological Brother          of CVA age 52   • ADD / ADHD Biological Daughter    • Anxiety disorder Biological Daughter    • No Known Problems Biological Son    • Autism spectrum disorder Biological Son         high functioning   • ADD / ADHD Biological Son         oldest, declines meds       SOCIAL/ TOBACCO HISTORY        Social History     Tobacco Use   • Smoking status: Former Smoker     Packs/day: 0.50     Years: 5.00     Pack years: 2.50     Start date: 1988     Quit date: 1993     Years since  quittin.6   • Smokeless tobacco: Never Used   Vaping Use   • Vaping Use: Never used   Substance Use Topics   • Alcohol use: Yes     Comment: Varies, drink on occasion wine, beer or vodka 3 x month   • Drug use: Not Currently     Social History     Social History Narrative    Children- 2 sons (16, 10), 1 daughter (13). 2021    Caffeine - iced tea    Exercise- yoga - will get back to it    Diet-     Pets-     Confucianism affiliation-     Lives with spouse and three children       REVIEW OF SYSTEMS      Review of Systems    Review of Systems   Constitutional: Positive for fatigue. Negative for activity change, appetite change and unexpected weight change.   HENT: Negative for dental problem, hearing loss, sore throat, trouble swallowing and voice change.    Eyes: Positive for itching. Negative for visual disturbance.   Respiratory: Negative for apnea, cough, chest tightness, shortness of breath and wheezing.    Cardiovascular: Negative for chest pain, palpitations and leg swelling.   Gastrointestinal: Positive for constipation. Negative for abdominal distention, abdominal pain, blood in stool and diarrhea.   Endocrine: Negative for cold intolerance, heat intolerance, polydipsia and polyuria.   Genitourinary: Negative for decreased urine volume, difficulty urinating, dysuria, flank pain, menstrual problem and urgency.   Musculoskeletal: Positive for back pain. Negative for arthralgias, gait problem, joint swelling and myalgias.   Skin: Positive for rash. Negative for color change and wound.        Varicose veins, hair loss   Allergic/Immunologic: Positive for environmental allergies and food allergies. Negative for immunocompromised state.        Frequent antibiotics use   Neurological: Positive for dizziness and light-headedness. Negative for tremors, seizures, syncope, weakness, numbness and headaches.   Hematological: Negative for adenopathy. Does not bruise/bleed easily.   Psychiatric/Behavioral: Positive for  "dysphoric mood. Negative for decreased concentration and sleep disturbance. The patient is not nervous/anxious.         Memory loss        PHYSICAL EXAMINATION      Visit Vitals  /60 (BP Location: Right upper arm, Patient Position: Sitting)   Pulse 86   Resp 18   Ht 1.575 m (5' 2\")   Wt 78.1 kg (172 lb 3.2 oz)   SpO2 99%   BMI 31.50 kg/m²        Physical Exam      LABS / IMAGING / EKG        Labs  I have reviewed the patient's pertinent labs.  No results found for: HGBA1C  Lab Results   Component Value Date    CHOL 157 01/26/2022     Lab Results   Component Value Date    HDL 58 01/26/2022     Lab Results   Component Value Date    LDLCALC 79 01/26/2022     Lab Results   Component Value Date    TRIG 121 01/26/2022     No results found for: CHOLHDL  Lab Results   Component Value Date    TSH 2.87 01/26/2022     Lab Results   Component Value Date    WBC 9.7 01/26/2022    HGB 15.2 01/26/2022    HCT 45.8 (H) 01/26/2022    MCV 91.8 01/26/2022     01/26/2022     Lab Results   Component Value Date     01/26/2022    K 4.6 01/26/2022     01/26/2022    CO2 29 01/26/2022    BUN 10 01/26/2022    CREATININE 0.74 01/26/2022    GLUCOSE 85 01/26/2022    AST 14 01/26/2022    ALT 13 01/26/2022    PROTEIN 7.3 01/26/2022    ALBUMIN 4.7 01/26/2022    BILITOT 0.8 01/26/2022    EGFR 94 01/26/2022    EGFR 109 01/26/2022      ASSESSMENT AND PLAN   Lipedema  Lipedema is a congenital enlargement of the of loose connective tissue (fat) on the legs, hips, buttocks, and sometimes upper arms.   The waist is generally spared as are the wrists,ankles, hands and feet.  This condition occurs almost exclusively in women and is thought to have a genetic component, but all details of how and why Lipedema develops are still under investigation. The current thinking is that changes in estrogen such as puberty, pregnancy, and menopause play a major role in lipedema progression.     Lipedema often creates easy bruising and pain in the " "effected areas due to swelling of the fat organ and eventual lymphatic compression, but some women experience no pain or discomfort. There is an overlap for many women with Lipedema to other structural issues like varicose veins, spider veins, Kvng-danlos syndrome, and being \"hyper flexible.\"    There are 5 subtypes of lipedema based on where the fat is preferentially stored.   Type I: Buttocks and hips   Type II: Buttocks to knees and inner knee   Type III: Buttocks to ankles   Type IV: Arms   Type V: Legs     Women with lipedema often have great difficulty losing weight despite great effort to get appropriate nutrition, routine exercise, and excellent self-care and stress management.  When weight loss does occur it is often significantly slower than would be expected based on a traditional calories and calories out model.      Treatment options for Lipedema:  Lipedema cannot be cured, but it can be managed.     Water pills and diuretics should be avoided whenever possible, as this makes clearing lymphatic tissue more difficult.    Steroids should be avoided as they encourage fat deposition once discontinued and also cause weakening of lymphatics.    Supplements: Vitamin D 2000 IU per day with food, Diosmin (micronized) 600 mg or more per day (Vasculera is a FDA approved form and can be prescribed through [facility] for $50 a month), and magnesium glycinate 250-500 mg a day can be helpful     Manual Techniques: Manual Lymphatic drainage, deep tissue massage, compression stockings, belly breathing, vibration plates (look up [employer]), and rebounding (mini-trampoline) can help move the stuck lymph tissue out of the lower body.     Nutrition: Eating a \"clean\" diet that avoids or greatly reduces toxins such as sugars, artificial sweeteners, dyes, preservatives, and pesticides is best.  It is also wise to avoid estrogen mimics in foods.  This includes all soy, flax, milk products (unless grass fed and organic) " "and the mycotoxins that are found in grains such as wheat, oats, rice, quinoa, barley, teff, and millet.  There is recent evidence that a ketogenic diet can be an effective strategy for lipedema management.     Physical activity:  Weight lifting and water activities are most effective strategies for lipedema as the muscle contraction with lifting helps the lymphatic flow.  The pressure from being in water also acts to compress and mobilize lymph.     Medications: Phentermine and metformin can help reduce pain associated with lipedema.  It is thought that metformin also acts as an \"anti-inflammatory\" for this condition.  No medications are FDA approved for Lipedema to date.     There is recent evidence that the GLP-1 family of medication (Ozempic, Victoza, Trulicity, Wegovy, Saxenda, Byetta) are effective treatment for lipedema. Please note that these medications are FDA approved for weight loss and diabetes, but not for lipedema specifically.    There is more information available at the Lipedema foundation.           Depression  Having good control over mood and managing stress is an important part of successful weight management.    Please continue to follow the  plan as outlined by your treating provider.      Consider using a relaxation technique daily to improve resiliency and mood.     Relaxation techniques are important in order to let your body recover from living in the fight and flight branch of our body's nervous system.  \"Relaxing\" is not the same as practicing a relaxation technique.  Current science on relaxation techniques suggests that practicing a relaxation 10 minutes a day can help to repair the body's overtaxed stress system.      Controlling something that is normally automatic forces us out of our sympathetic (fight and flight) nervous system and into our parasympathetic (rest and recover) nervous system. The body cannot be in both sections of the nervous system at the same time!  Relaxation " "techniques require controlling something that is normally automatic such as breathing, heart rate, posture, and thoughts.  Common relaxation techniques include : various meditations (mindful, transcendental, guided, concentrated body scan, loving kindness), deep breathing, yoga, Keenan Chi,  progressive muscle relaxation, heart math, and binaural beats.         Obesity  Weight loss through a multifaceted approach addressing metabolic factors, nutrition, physical activity, and mental well being will help this patient improve or resolve the following conditions related to mechanical forces of weight: Back pain, snoring, joint pain, varicose veins, as well as the following conditions related to metabolic and inflammatory processes of excess and dysfunctional adipose tissue or \"adiposopathy\": Blood pressure, cholesterol.     Understand that weight loss through sustainable changes will probably not provide quick results, but will provide lasting results.  Regaining rapidly lost weight is more damaging than never loosing it at all.     Medically significant and beneficial weight loss starts with a 5% weight reduction.      Today we discussed that weight balance is a complex process with many possible risk factors that far exceed the traditional concept of caloric balance. We identified your specific risk factors and began a plan to make sustainable life changes.  Lasting change come from new habit formation.  This takes time and consistent effort.      After today's visit, we determined that contributing factors to the above weight related conditions include:   Lipedema, insulin resistance, childhood history, history of bariatric surgery, weight cycling, cortisol (stress), inflammation (allergies, eczema), micro biome, nutrition (processed foods, artificial sweeteners, inadequate water), not hunger eating (emotional, fatigue, refuse not, distracted, sugar cravings), low nonexercise activity, no current dedicated " "exercise.    We discussed the concept of Obesity as a chronic disease requiring a long term treatment plan.     The plan we recommend includes the \"SAME\" core strategy over time:  Structure, Accountability, Metabolic Advantage, Environment Control.     S: Structure: Specific prescriptions for nutrition, water, physical activity, and mental wellbeing  A: Accountability: Monthly visits with your team (Physician, Nurse Practitioner, Registered Dietitian, Exercise physiologist, or Psychologist) to evaluate and change the plan as needed based on successes ad challenges that arise.   M:Metabolic Advantage: Nutritional supplements, Medications for appetite or food craving/addiction control, and medical foods when needed.   E: Environmental control: Increasing awareness of food and activity behaviors, eliminating old habits and creating new ones.     Today we outlined the following beginning steps to put goals into action:    Nutrition: Discussed low insulin and low estrogen food plan.  Also advised eating real food, food can be used as medicine, processed food is poison.  I provided a lipedema nutrition plan today, and she will begin visiting with registered dietitian.    The most successful patients keep a food journal to increase awareness, not to count calories. Use provided paper journal, your own notebook or an tiff, and bring it to each visit.     Activity: I have encouraged initiating yoga, she has history of doing this well and feeling well.  We discussed how yoga can help support the lymphatic system as well as improve muscle strength, which can help improve metabolism.    Be intentional about hourly movement --- walking or standing for 5 minutes out of every hour.     Wellbeing: 10 minutes of dedicated relaxation daily is important for emotional wellbeing.    Step 1 in awareness is knowing why you want to reduce your weight.  Please complete your goal sheet in your intake folder.     Medical management: Already on " Wellbutrin at 450 mg which is helping to control appetite and impulse control around food.  We discussed the possibility of adding in metformin to help with inflammation with lipedema, as well as the possibility of using naltrexone or topiramate for sugar cravings if needed.  For now we will have her continue on the Wellbutrin, and explore other options only if needed.    You are required to meet with your team monthly in order to receive prescriptions for weight loss medications.  Medication without sustainable change will not lead to long term weight loss.  I will not be part of a weight loss strategy that has short term gains at the expense of long term harm.     Probiotics: Consume foods with probiotics or take a probiotic daily (like Udo's Adult Probiotic, VSL-3, Florastor, or Culturelle).   Omega-3: Take a good quality omega -3 supplement (ex:[employer] - sold here, [employer], [employer], [employer]) or eat 2 servings of fatty fish weekly.   Vitamin D 3: 2,000 IU daily (or more if deficient) with a meal containing fat for best absorption    Initiate and maintain food journal: Topic Handout provided today.     Commit to follow up --- loose, gain or stay the same.  Studies show that the most successful patients meet with their providers every 2-4 weeks. Change will happen if you continue to manage and modify your plan.  We are here to help you do that no matter what happens in the interim. When you are executing your plan well, we will add to your plan.  When you are struggling, that is when you need your appointments the most. Cancelling or rescheduling appointments to give yourself time to make changes is counterproductive.      In Conclusion:     Our plan of action includes frequent visits over the next year to focus on education regarding the appropriate nutrition and physical activity best suited to our patient's individual needs, SMART goal setting and other strategies for lifestyle change,  mindfulness, stress reduction,  accountability, and review of any initiated medical interventions.  Visits will space out over time with formation of new life habits and stabilization of the treatment plan. We will enlist the services of the nutritionist, and may in the future consult with exercise physiology or emotional wellness.      Pre-chart/Chart Prep: 5 minutes  Face to Face time: 60 minutes  Chart Completion: 5 minutes    I spent 75 minutes on this date of service performing the following activities: obtaining history, performing examination, entering orders, documenting, preparing for visit, obtaining / reviewing records and providing counseling and education.          Yasemin Rm MD  8/23/2022     Thank you very much for allowing us to participate in the care of your patient. Please do not hesitate to call or e-mail if there are any questions.

## 2022-08-23 ENCOUNTER — OFFICE VISIT (OUTPATIENT)
Dept: BARIATRICS/WEIGHT MGMT | Facility: CLINIC | Age: 52
End: 2022-08-23
Payer: COMMERCIAL

## 2022-08-23 VITALS
SYSTOLIC BLOOD PRESSURE: 112 MMHG | WEIGHT: 172.2 LBS | HEIGHT: 62 IN | BODY MASS INDEX: 31.69 KG/M2 | OXYGEN SATURATION: 99 % | DIASTOLIC BLOOD PRESSURE: 60 MMHG | HEART RATE: 86 BPM | RESPIRATION RATE: 18 BRPM

## 2022-08-23 DIAGNOSIS — E66.09 CLASS 1 OBESITY DUE TO EXCESS CALORIES WITH SERIOUS COMORBIDITY AND BODY MASS INDEX (BMI) OF 31.0 TO 31.9 IN ADULT: ICD-10-CM

## 2022-08-23 DIAGNOSIS — F32.89 OTHER DEPRESSION: ICD-10-CM

## 2022-08-23 DIAGNOSIS — L70.8 OTHER ACNE: ICD-10-CM

## 2022-08-23 DIAGNOSIS — R60.9 LIPEDEMA: Primary | ICD-10-CM

## 2022-08-23 DIAGNOSIS — E66.811 CLASS 1 OBESITY DUE TO EXCESS CALORIES WITH SERIOUS COMORBIDITY AND BODY MASS INDEX (BMI) OF 31.0 TO 31.9 IN ADULT: ICD-10-CM

## 2022-08-23 PROCEDURE — 3008F BODY MASS INDEX DOCD: CPT | Performed by: FAMILY MEDICINE

## 2022-08-23 PROCEDURE — 99205 OFFICE O/P NEW HI 60 MIN: CPT | Performed by: FAMILY MEDICINE

## 2022-08-23 RX ORDER — IPRATROPIUM BROMIDE 42 UG/1
SPRAY, METERED NASAL
COMMUNITY
Start: 2022-08-08 | End: 2022-09-01 | Stop reason: SDUPTHER

## 2022-08-23 RX ORDER — DIOSMIN COMPLEX NO.1 630 MG
1 TABLET ORAL DAILY
Qty: 90 TABLET | Refills: 0 | Status: SHIPPED | OUTPATIENT
Start: 2022-08-23 | End: 2022-08-24

## 2022-08-23 NOTE — ASSESSMENT & PLAN NOTE
"Weight loss through a multifaceted approach addressing metabolic factors, nutrition, physical activity, and mental well being will help this patient improve or resolve the following conditions related to mechanical forces of weight: Back pain, snoring, joint pain, varicose veins, as well as the following conditions related to metabolic and inflammatory processes of excess and dysfunctional adipose tissue or \"adiposopathy\": Blood pressure, cholesterol.     Understand that weight loss through sustainable changes will probably not provide quick results, but will provide lasting results.  Regaining rapidly lost weight is more damaging than never loosing it at all.     Medically significant and beneficial weight loss starts with a 5% weight reduction.      Today we discussed that weight balance is a complex process with many possible risk factors that far exceed the traditional concept of caloric balance. We identified your specific risk factors and began a plan to make sustainable life changes.  Lasting change come from new habit formation.  This takes time and consistent effort.      After today's visit, we determined that contributing factors to the above weight related conditions include:   Lipedema, insulin resistance, childhood history, history of bariatric surgery, weight cycling, cortisol (stress), inflammation (allergies, eczema), micro biome, nutrition (processed foods, artificial sweeteners, inadequate water), not hunger eating (emotional, fatigue, refuse not, distracted, sugar cravings), low nonexercise activity, no current dedicated exercise.    We discussed the concept of Obesity as a chronic disease requiring a long term treatment plan.     The plan we recommend includes the \"SAME\" core strategy over time:  Structure, Accountability, Metabolic Advantage, Environment Control.     S: Structure: Specific prescriptions for nutrition, water, physical activity, and mental wellbeing  A: Accountability: Monthly " visits with your team (Physician, Nurse Practitioner, Registered Dietitian, Exercise physiologist, or Psychologist) to evaluate and change the plan as needed based on successes ad challenges that arise.   M:Metabolic Advantage: Nutritional supplements, Medications for appetite or food craving/addiction control, and medical foods when needed.   E: Environmental control: Increasing awareness of food and activity behaviors, eliminating old habits and creating new ones.     Today we outlined the following beginning steps to put goals into action:    Nutrition: Discussed low insulin and low estrogen food plan.  Also advised eating real food, food can be used as medicine, processed food is poison.  I provided a lipedema nutrition plan today, and she will begin visiting with registered dietitian.    The most successful patients keep a food journal to increase awareness, not to count calories. Use provided paper journal, your own notebook or an tiff, and bring it to each visit.     Activity: I have encouraged initiating yoga, she has history of doing this well and feeling well.  We discussed how yoga can help support the lymphatic system as well as improve muscle strength, which can help improve metabolism.    Be intentional about hourly movement --- walking or standing for 5 minutes out of every hour.     Wellbeing: 10 minutes of dedicated relaxation daily is important for emotional wellbeing.    Step 1 in awareness is knowing why you want to reduce your weight.  Please complete your goal sheet in your intake folder.     Medical management: Already on Wellbutrin at 450 mg which is helping to control appetite and impulse control around food.  We discussed the possibility of adding in metformin to help with inflammation with lipedema, as well as the possibility of using naltrexone or topiramate for sugar cravings if needed.  For now we will have her continue on the Wellbutrin, and explore other options only if needed.    You  are required to meet with your team monthly in order to receive prescriptions for weight loss medications.  Medication without sustainable change will not lead to long term weight loss.  I will not be part of a weight loss strategy that has short term gains at the expense of long term harm.     Probiotics: Consume foods with probiotics or take a probiotic daily (like [employer]'s Adult Probiotic, VSL-3, Florastor, or Culturelle).   Omega-3: Take a good quality omega -3 supplement (ex:Polimetrix health - sold here, [employer], [employer], [employer]) or eat 2 servings of fatty fish weekly.   Vitamin D 3: 2,000 IU daily (or more if deficient) with a meal containing fat for best absorption    Initiate and maintain food journal: Topic Handout provided today.     Commit to follow up --- loose, gain or stay the same.  Studies show that the most successful patients meet with their providers every 2-4 weeks. Change will happen if you continue to manage and modify your plan.  We are here to help you do that no matter what happens in the interim. When you are executing your plan well, we will add to your plan.  When you are struggling, that is when you need your appointments the most. Cancelling or rescheduling appointments to give yourself time to make changes is counterproductive.      In Conclusion:     Our plan of action includes frequent visits over the next year to focus on education regarding the appropriate nutrition and physical activity best suited to our patient's individual needs, SMART goal setting and other strategies for lifestyle change, mindfulness, stress reduction,  accountability, and review of any initiated medical interventions.  Visits will space out over time with formation of new life habits and stabilization of the treatment plan. We will enlist the services of the nutritionist, and may in the future consult with exercise physiology or emotional wellness.      Pre-chart/Chart Prep: 5 minutes  Face to Face  time: 60 minutes  Chart Completion: 5 minutes    I spent 75 minutes on this date of service performing the following activities: obtaining history, performing examination, entering orders, documenting, preparing for visit, obtaining / reviewing records and providing counseling and education.

## 2022-08-23 NOTE — ASSESSMENT & PLAN NOTE
"Having good control over mood and managing stress is an important part of successful weight management.    Please continue to follow the  plan as outlined by your treating provider.      Consider using a relaxation technique daily to improve resiliency and mood.     Relaxation techniques are important in order to let your body recover from living in the fight and flight branch of our body's nervous system.  \"Relaxing\" is not the same as practicing a relaxation technique.  Current science on relaxation techniques suggests that practicing a relaxation 10 minutes a day can help to repair the body's overtaxed stress system.      Controlling something that is normally automatic forces us out of our sympathetic (fight and flight) nervous system and into our parasympathetic (rest and recover) nervous system. The body cannot be in both sections of the nervous system at the same time!  Relaxation techniques require controlling something that is normally automatic such as breathing, heart rate, posture, and thoughts.  Common relaxation techniques include : various meditations (mindful, transcendental, guided, concentrated body scan, loving kindness), deep breathing, yoga, Keenan Chi,  progressive muscle relaxation, heart math, and binaural beats.       "

## 2022-08-23 NOTE — ASSESSMENT & PLAN NOTE
"Lipedema is a congenital enlargement of the of loose connective tissue (fat) on the legs, hips, buttocks, and sometimes upper arms.   The waist is generally spared as are the wrists,ankles, hands and feet.  This condition occurs almost exclusively in women and is thought to have a genetic component, but all details of how and why Lipedema develops are still under investigation. The current thinking is that changes in estrogen such as puberty, pregnancy, and menopause play a major role in lipedema progression.     Lipedema often creates easy bruising and pain in the effected areas due to swelling of the fat organ and eventual lymphatic compression, but some women experience no pain or discomfort. There is an overlap for many women with Lipedema to other structural issues like varicose veins, spider veins, Kvng-danlos syndrome, and being \"hyper flexible.\"    There are 5 subtypes of lipedema based on where the fat is preferentially stored.   Type I: Buttocks and hips   Type II: Buttocks to knees and inner knee   Type III: Buttocks to ankles   Type IV: Arms   Type V: Legs     Women with lipedema often have great difficulty losing weight despite great effort to get appropriate nutrition, routine exercise, and excellent self-care and stress management.  When weight loss does occur it is often significantly slower than would be expected based on a traditional calories and calories out model.      Treatment options for Lipedema:  Lipedema cannot be cured, but it can be managed.     Water pills and diuretics should be avoided whenever possible, as this makes clearing lymphatic tissue more difficult.    Steroids should be avoided as they encourage fat deposition once discontinued and also cause weakening of lymphatics.    Supplements: Vitamin D 2000 IU per day with food, Diosmin (micronized) 600 mg or more per day (Vasculera is a FDA approved form and can be prescribed through [facility] for $50 a month), and magnesium " "glycinate 250-500 mg a day can be helpful     Manual Techniques: Manual Lymphatic drainage, deep tissue massage, compression stockings, belly breathing, vibration plates (look up [email or web address]), and rebounding (mini-trampoline) can help move the stuck lymph tissue out of the lower body.     Nutrition: Eating a \"clean\" diet that avoids or greatly reduces toxins such as sugars, artificial sweeteners, dyes, preservatives, and pesticides is best.  It is also wise to avoid estrogen mimics in foods.  This includes all soy, flax, milk products (unless grass fed and organic) and the mycotoxins that are found in grains such as wheat, oats, rice, quinoa, barley, teff, and millet.  There is recent evidence that a ketogenic diet can be an effective strategy for lipedema management.     Physical activity:  Weight lifting and water activities are most effective strategies for lipedema as the muscle contraction with lifting helps the lymphatic flow.  The pressure from being in water also acts to compress and mobilize lymph.     Medications: Phentermine and metformin can help reduce pain associated with lipedema.  It is thought that metformin also acts as an \"anti-inflammatory\" for this condition.  No medications are FDA approved for Lipedema to date.     There is recent evidence that the GLP-1 family of medication (Ozempic, Victoza, Trulicity, Wegovy, Saxenda, Byetta) are effective treatment for lipedema. Please note that these medications are FDA approved for weight loss and diabetes, but not for lipedema specifically.    There is more information available at the Lipedema foundation.         "

## 2022-08-23 NOTE — PATIENT INSTRUCTIONS
"Lipedema is a congenital enlargement of the of loose connective tissue (fat) on the legs, hips, buttocks, and sometimes upper arms.   The waist is generally spared as are the wrists,ankles, hands and feet.  This condition occurs almost exclusively in women and is thought to have a genetic component, but all details of how and why Lipedema develops are still under investigation. The current thinking is that changes in estrogen such as puberty, pregnancy, and menopause play a major role in lipedema progression.     Lipedema often creates easy bruising and pain in the effected areas due to swelling of the fat organ and eventual lymphatic compression, but some women experience no pain or discomfort. There is an overlap for many women with Lipedema to other structural issues like varicose veins, spider veins, Kvng-danlos syndrome, and being \"hyper flexible.\"    There are 5 subtypes of lipedema based on where the fat is preferentially stored.   Type I: Buttocks and hips   Type II: Buttocks to knees and inner knee   Type III: Buttocks to ankles   Type IV: Arms   Type V: Legs     Women with lipedema often have great difficulty losing weight despite great effort to get appropriate nutrition, routine exercise, and excellent self-care and stress management.  When weight loss does occur it is often significantly slower than would be expected based on a traditional calories and calories out model.      Treatment options for Lipedema:  Lipedema cannot be cured, but it can be managed.     Water pills and diuretics should be avoided whenever possible, as this makes clearing lymphatic tissue more difficult.    Steroids should be avoided as they encourage fat deposition once discontinued and also cause weakening of lymphatics.    Supplements: Vitamin D 2000 IU per day with food, Diosmin (micronized) 600 mg or more per day (Vasculera is a FDA approved form and can be prescribed through [facility] for $50 a month), and magnesium " "glycinate 250-500 mg a day can be helpful     Manual Techniques: Manual Lymphatic drainage, deep tissue massage, compression stockings, belly breathing, vibration plates (look up [email or web address]), and rebounding (mini-trampoline) can help move the stuck lymph tissue out of the lower body.     Nutrition: Eating a \"clean\" diet that avoids or greatly reduces toxins such as sugars, artificial sweeteners, dyes, preservatives, and pesticides is best.  It is also wise to avoid estrogen mimics in foods.  This includes all soy, flax, milk products (unless grass fed and organic) and the mycotoxins that are found in grains such as wheat, oats, rice, quinoa, barley, teff, and millet.  There is recent evidence that a ketogenic diet can be an effective strategy for lipedema management.     Physical activity:  Weight lifting and water activities are most effective strategies for lipedema as the muscle contraction with lifting helps the lymphatic flow.  The pressure from being in water also acts to compress and mobilize lymph.     Medications: Phentermine and metformin can help reduce pain associated with lipedema.  It is thought that metformin also acts as an \"anti-inflammatory\" for this condition.  No medications are FDA approved for Lipedema to date.     There is recent evidence that the GLP-1 family of medication (Ozempic, Victoza, Trulicity, Wegovy, Saxenda, Byetta) are effective treatment for lipedema. Please note that these medications are FDA approved for weight loss and diabetes, but not for lipedema specifically.    There is more information available at the Lipedema foundation.       "

## 2022-08-24 RX ORDER — DIOSMIN COMPLEX NO.1 630 MG
TABLET ORAL
Qty: 90 TABLET | Refills: 0 | Status: SHIPPED | OUTPATIENT
Start: 2022-08-24 | End: 2022-11-26 | Stop reason: SDUPTHER

## 2022-08-31 ENCOUNTER — CLINICAL SUPPORT (OUTPATIENT)
Dept: INTERNAL MEDICINE | Facility: CLINIC | Age: 52
End: 2022-08-31
Payer: COMMERCIAL

## 2022-08-31 DIAGNOSIS — R09.81 SINUS CONGESTION: Primary | ICD-10-CM

## 2022-08-31 LAB
FLUAV RNA SPEC QL NAA+PROBE: NEGATIVE
FLUBV RNA SPEC QL NAA+PROBE: NEGATIVE
RSV RNA SPEC QL NAA+PROBE: NEGATIVE
SARS-COV-2 RNA RESP QL NAA+PROBE: NEGATIVE

## 2022-08-31 PROCEDURE — 87637 SARSCOV2&INF A&B&RSV AMP PRB: CPT | Performed by: FAMILY MEDICINE

## 2022-08-31 PROCEDURE — 200200 PR NO CHARGE: Performed by: FAMILY MEDICINE

## 2022-09-01 ENCOUNTER — OFFICE VISIT (OUTPATIENT)
Dept: INTERNAL MEDICINE | Facility: CLINIC | Age: 52
End: 2022-09-01
Payer: COMMERCIAL

## 2022-09-01 VITALS
RESPIRATION RATE: 17 BRPM | HEART RATE: 89 BPM | TEMPERATURE: 98 F | WEIGHT: 169 LBS | BODY MASS INDEX: 31.1 KG/M2 | HEIGHT: 62 IN | DIASTOLIC BLOOD PRESSURE: 76 MMHG | SYSTOLIC BLOOD PRESSURE: 120 MMHG | OXYGEN SATURATION: 98 %

## 2022-09-01 DIAGNOSIS — R60.9 LIPEDEMA: ICD-10-CM

## 2022-09-01 DIAGNOSIS — I10 ESSENTIAL HYPERTENSION, BENIGN: ICD-10-CM

## 2022-09-01 DIAGNOSIS — J34.89 SINUS PRESSURE: Primary | ICD-10-CM

## 2022-09-01 PROCEDURE — 99214 OFFICE O/P EST MOD 30 MIN: CPT | Performed by: INTERNAL MEDICINE

## 2022-09-01 PROCEDURE — 3008F BODY MASS INDEX DOCD: CPT | Performed by: INTERNAL MEDICINE

## 2022-09-01 RX ORDER — FAMOTIDINE 40 MG/1
40 TABLET, FILM COATED ORAL 2 TIMES DAILY
Qty: 60 TABLET | Refills: 2 | Status: SHIPPED | OUTPATIENT
Start: 2022-09-01 | End: 2022-09-07 | Stop reason: SDUPTHER

## 2022-09-01 RX ORDER — SPIRONOLACTONE 100 MG/1
100 TABLET, FILM COATED ORAL DAILY
Qty: 90 TABLET | Refills: 3 | COMMUNITY
Start: 2022-09-01 | End: 2023-03-30

## 2022-09-01 RX ORDER — IPRATROPIUM BROMIDE 42 UG/1
1-2 SPRAY, METERED NASAL 3 TIMES DAILY
Qty: 15 ML | Refills: 3 | Status: SHIPPED | OUTPATIENT
Start: 2022-09-01 | End: 2022-09-07 | Stop reason: SDUPTHER

## 2022-09-01 ASSESSMENT — ENCOUNTER SYMPTOMS
SLEEP DISTURBANCE: 0
DIFFICULTY URINATING: 0
NERVOUS/ANXIOUS: 0
FEVER: 0
ABDOMINAL PAIN: 0
SHORTNESS OF BREATH: 0
SINUS PRESSURE: 0
UNEXPECTED WEIGHT CHANGE: 0

## 2022-09-01 NOTE — ASSESSMENT & PLAN NOTE
Well-controlled on losartan 50 mg daily and HCTZ 12.5 mg daily.  Also take spironolactone 100 mg for adult acne.  Was recommended to decrease one of the diuretics.  We will try decreasing spironolactone to 50 mg and see if it still provides skin benefits.  Provided BP parameters

## 2022-09-01 NOTE — ASSESSMENT & PLAN NOTE
Improved with the addition of Atrovent.  Can slowly wean off of other allergy medications as tolerated

## 2022-09-01 NOTE — PROGRESS NOTES
Subjective      Patient ID: Aurora Cedillo is a 51 y.o. female.    HPI    Patient presents with c/o sinus pressure about 2.5 wks ago.  Took Sudafed and Ibuprofen which made it better.  had similar symptoms first. Tested neg for covid.  Had hoarseness and postnasal drainage and tiredness.  Takes allergy meds and nasal spray regularly.  Saw Dr. Johnson, allergist and diagnosed with vasomotor rhinitis. No allergies. Was prescribed Atrovent.  Was advised she could probably stop her allergy meds, but decided to continue for skin allergy issues.  Saw Dr. Mayen, ENT about a month ago for sudden hearing loss and tinnitus and treated with a course of steroids. Feeling better overall.  Saw Dr. Macdonald with [facility] and diagnosed with lipidema.  Prescribed the Whole 30 diet and vasculera supplements.  Also thinking about adding naltrexone.  Taking Wellbutrin 450 mg daily.  No other new/acute concerns.    The following have been reviewed and updated as appropriate in this visit:     Allergies  Meds  Problems           Past Medical History:   Diagnosis Date    Allergic rhinitis     Anxiety 5 years ago    Arthritis     Back pain     BMI 40.0-44.9, adult (CMS/[facility])     Cellulitis     Depression 06/28/2012    Dysfunction of both eustachian tubes     Essential hypertension, benign 06/28/2012    GERD (gastroesophageal reflux disease) After lap band    Gout 15-20 years ago    No longer have    High cholesterol 20 years ago    Hyperlipidemia     Irregular menses     Plantar fasciitis 20 years ago    Right bundle branch block 06/28/2012    Sensorineural hearing loss, bilateral     Shingles     on head, age 20's    Sinusitis     Skin abnormalities All my life    Several skin issues some related to allergies, stress    Skin rash     Sleep apnea     Snoring     UTI (urinary tract infection)     Varicose veins of lower extremity     Vitamin D deficiency     Yeast infection      Past Surgical History:    Procedure Laterality Date    CARPAL TUNNEL RELEASE  25 years ago    Corrected with surgery    COLONOSCOPY      KNEE ARTHROSCOPY  15 years ago    KNEE SURGERY  7 years ago    LAPAROSCOPIC GASTRIC BANDING  15 years ago?    Unsure of exact timeframe    PARTIAL KNEE ARTHROPLASTY Left     SINUS SURGERY      VAGINAL DELIVERY       Family History   Problem Relation Age of Onset    Atrial fibrillation Biological Mother     Mental illness Biological Mother     Atrial fibrillation Biological Father     Stroke Biological Father     Heart disease Biological Father     Hypertension Biological Father     Mental illness Biological Father     Obesity Biological Father     Atrial fibrillation Biological Brother     Stroke Biological Brother          of CVA age 52    ADD / ADHD Biological Daughter     Anxiety disorder Biological Daughter     No Known Problems Biological Son     Autism spectrum disorder Biological Son         high functioning    ADD / ADHD Biological Son         oldest, declines meds     Social History     Socioeconomic History    Marital status:      Spouse name: None    Number of children: 3    Years of education: None    Highest education level: None   Occupational History    Occupation: [employer]   Tobacco Use    Smoking status: Former Smoker     Packs/day: 0.50     Years: 5.00     Pack years: 2.50     Start date: 1988     Quit date: 1993     Years since quittin.6    Smokeless tobacco: Never Used   Vaping Use    Vaping Use: Never used   Substance and Sexual Activity    Alcohol use: Yes     Comment: Varies, drink on occasion wine, beer or vodka 3 x month    Drug use: Not Currently    Sexual activity: Yes     Partners: Male     Birth control/protection: Abstinence, Coitus interruptus/Pulling Out, Condom Male     Comment: Unable to take pill due to side effects.   Social History Narrative    Children- 2 sons (16, 10), 1 daughter (13). 2021    Caffeine - iced  tea    Exercise- yoga - will get back to it    Diet-     Pets-     [employer] affiliation-     Lives with spouse and three children       Review of Systems   Constitutional: Negative for fever and unexpected weight change.   HENT: Negative for hearing loss, sinus pressure (Improved) and tinnitus.    Respiratory: Negative for shortness of breath.    Cardiovascular: Negative for chest pain.   Gastrointestinal: Negative for abdominal pain.   Genitourinary: Negative for difficulty urinating.   Psychiatric/Behavioral: Negative for sleep disturbance. The patient is not nervous/anxious.        Allergies   Allergen Reactions    Neomycin Hives    Neomycin-Bacitracnzn-Polymyxnb Itching    Penicillins     Latex Rash    Miconazole Rash     Current Outpatient Medications   Medication Sig Dispense Refill    azelastine (ASTELIN) 137 mcg (0.1 %) nasal spray Administer 1 spray into each nostril 2 (two) times a day. Use in each nostril as directed. 180 mL 3    buPROPion XL (WELLBUTRIN XL) 150 mg 24 hr tablet Take 1 tablet (150 mg total) by mouth daily. With 300mg tabs (total 450mg) 90 tablet 3    buPROPion XL (WELLBUTRIN XL) 300 mg 24 hr tablet Take 1 tablet (300 mg total) by mouth daily. With 150mg tabs (total 450mg) 90 tablet 3    chromium picolinate 200 mcg tablet Take by mouth.      crisaborole (EUCRISA) 2 % ointment Eucrisa 2 % topical ointment      famotidine (PEPCID) 40 mg tablet Take 1 tablet (40 mg total) by mouth 2 (two) times a day. 60 tablet 2    fluticasone propionate (FLONASE) 50 mcg/actuation nasal spray Administer 1 spray into each nostril 2 (two) times a day. With Astelin 16 g 3    hydrochlorothiazide (HYDRODIURIL) 25 mg tablet Take 0.5 tablets (12.5 mg total) by mouth daily. 90 tablet 3    ipratropium (ATROVENT) 42 mcg (0.06 %) nasal spray Administer 1-2 sprays into each nostril 3 (three) times a day. 15 mL 3    L. acidophilus/pectin, citrus (ACIDOPHILUS PROBIOTIC ORAL) Take 0.5 mg by mouth.       "loratadine (CLARITIN) 10 mg tablet Take 10 mg by mouth daily.      losartan (COZAAR) 50 mg tablet Take 1 tablet (50 mg total) by mouth daily. 90 tablet 3    montelukast (SINGULAIR) 10 mg tablet Take 1 tablet (10 mg total) by mouth once daily. 90 tablet 3    rosuvastatin (CRESTOR) 5 mg tablet Take 1 tablet (5 mg total) by mouth daily. 90 tablet 3    spironolactone (ALDACTONE) 100 mg tablet Take 0.5 tablets (50 mg total) by mouth daily. 90 tablet 3    VASCULERA 630 mg tablet Take one tablet by mouth daily 90 tablet 0     No current facility-administered medications for this visit.       Objective   Vitals:    09/01/22 1306   BP: 120/76   Pulse: 89   Resp: 17   Temp: 36.7 °C (98 °F)   SpO2: 98%   Weight: 76.7 kg (169 lb)   Height: 1.575 m (5' 2\")     Body mass index is 30.91 kg/m².    Physical Exam  Constitutional:       Appearance: Normal appearance. She is well-developed.   HENT:      Head: Normocephalic and atraumatic.      Right Ear: Tympanic membrane, ear canal and external ear normal.      Left Ear: Tympanic membrane, ear canal and external ear normal.      Nose: Nose normal.      Mouth/Throat:      Mouth: Mucous membranes are moist.      Pharynx: Oropharynx is clear. Uvula midline.   Eyes:      General: Lids are normal.   Cardiovascular:      Rate and Rhythm: Normal rate and regular rhythm.      Heart sounds: Normal heart sounds, S1 normal and S2 normal. No murmur heard.  Pulmonary:      Effort: Pulmonary effort is normal. No respiratory distress.      Breath sounds: Normal breath sounds. No decreased breath sounds, rhonchi or rales.   Abdominal:      General: Bowel sounds are normal.      Palpations: Abdomen is soft.      Tenderness: There is no abdominal tenderness.   Musculoskeletal:      Cervical back: Normal range of motion and neck supple.   Skin:     General: Skin is warm and dry.   Neurological:      Mental Status: She is alert and oriented to person, place, and time.      Cranial Nerves: No cranial " nerve deficit.      Gait: Gait normal.   Psychiatric:         Behavior: Behavior normal.         Thought Content: Thought content normal.         Judgment: Judgment normal.         Assessment/Plan   Problem List Items Addressed This Visit        Circulatory    Essential hypertension, benign     Well-controlled on losartan 50 mg daily and HCTZ 12.5 mg daily.  Also take spironolactone 100 mg for adult acne.  Was recommended to decrease one of the diuretics.  We will try decreasing spironolactone to 50 mg and see if it still provides skin benefits.  Provided BP parameters           Relevant Medications    spironolactone (ALDACTONE) 100 mg tablet       Dermatologic    Lipedema     Diagnosed at the [facility].  Will work on whole 30 diet and vascular supplements.  Continue regular follow-up              Ears/Nose/Throat    Sinus pressure - Primary     Improved with the addition of Atrovent.  Can slowly wean off of other allergy medications as tolerated                 Caren Guardado MD    9/1/2022

## 2022-09-02 NOTE — ASSESSMENT & PLAN NOTE
Diagnosed at the [facility].  Will work on whole 30 diet and vascular supplements.  Continue regular follow-up

## 2022-09-07 NOTE — TELEPHONE ENCOUNTER
Pt's insurance requires 90 day supply. Please send to [facility].    Medicine Refill Request    Last Office: 9/1/2022   Last Consult Visit: Visit date not found  Last Telemedicine Visit: Visit date not found    Next Appointment: 11/1/2022      Current Outpatient Medications:     azelastine (ASTELIN) 137 mcg (0.1 %) nasal spray, Administer 1 spray into each nostril 2 (two) times a day. Use in each nostril as directed., Disp: 180 mL, Rfl: 3    buPROPion XL (WELLBUTRIN XL) 150 mg 24 hr tablet, Take 1 tablet (150 mg total) by mouth daily. With 300mg tabs (total 450mg), Disp: 90 tablet, Rfl: 3    buPROPion XL (WELLBUTRIN XL) 300 mg 24 hr tablet, Take 1 tablet (300 mg total) by mouth daily. With 150mg tabs (total 450mg), Disp: 90 tablet, Rfl: 3    chromium picolinate 200 mcg tablet, Take by mouth., Disp: , Rfl:     crisaborole (EUCRISA) 2 % ointment, Eucrisa 2 % topical ointment, Disp: , Rfl:     famotidine (PEPCID) 40 mg tablet, Take 1 tablet (40 mg total) by mouth 2 (two) times a day., Disp: 60 tablet, Rfl: 2    fluticasone propionate (FLONASE) 50 mcg/actuation nasal spray, Administer 1 spray into each nostril 2 (two) times a day. With Astelin, Disp: 16 g, Rfl: 3    hydrochlorothiazide (HYDRODIURIL) 25 mg tablet, Take 0.5 tablets (12.5 mg total) by mouth daily., Disp: 90 tablet, Rfl: 3    ipratropium (ATROVENT) 42 mcg (0.06 %) nasal spray, Administer 1-2 sprays into each nostril 3 (three) times a day., Disp: 15 mL, Rfl: 3    L. acidophilus/pectin, citrus (ACIDOPHILUS PROBIOTIC ORAL), Take 0.5 mg by mouth., Disp: , Rfl:     loratadine (CLARITIN) 10 mg tablet, Take 10 mg by mouth daily., Disp: , Rfl:     losartan (COZAAR) 50 mg tablet, Take 1 tablet (50 mg total) by mouth daily., Disp: 90 tablet, Rfl: 3    montelukast (SINGULAIR) 10 mg tablet, Take 1 tablet (10 mg total) by mouth once daily., Disp: 90 tablet, Rfl: 3    rosuvastatin (CRESTOR) 5 mg tablet, Take 1 tablet (5 mg total) by mouth daily., Disp:  90 tablet, Rfl: 3    spironolactone (ALDACTONE) 100 mg tablet, Take 0.5 tablets (50 mg total) by mouth daily., Disp: 90 tablet, Rfl: 3    VASCULERA 630 mg tablet, Take one tablet by mouth daily, Disp: 90 tablet, Rfl: 0      BP Readings from Last 3 Encounters:   09/01/22 120/76   08/23/22 112/60   07/28/22 114/72       Recent Lab results:  Lab Results   Component Value Date    CHOL 157 01/26/2022   ,   Lab Results   Component Value Date    HDL 58 01/26/2022   ,   Lab Results   Component Value Date    LDLCALC 79 01/26/2022   ,   Lab Results   Component Value Date    TRIG 121 01/26/2022        Lab Results   Component Value Date    GLUCOSE 85 01/26/2022   , No results found for: HGBA1C      Lab Results   Component Value Date    CREATININE 0.74 01/26/2022       Lab Results   Component Value Date    TSH 2.87 01/26/2022

## 2022-09-09 RX ORDER — IPRATROPIUM BROMIDE 42 UG/1
1-2 SPRAY, METERED NASAL 3 TIMES DAILY
Qty: 45 ML | Refills: 1 | Status: SHIPPED | OUTPATIENT
Start: 2022-09-09 | End: 2023-03-06

## 2022-09-09 RX ORDER — FAMOTIDINE 40 MG/1
40 TABLET, FILM COATED ORAL 2 TIMES DAILY
Qty: 180 TABLET | Refills: 1 | Status: SHIPPED | OUTPATIENT
Start: 2022-09-09 | End: 2025-01-03

## 2022-09-19 ENCOUNTER — TELEMEDICINE (OUTPATIENT)
Dept: INTERNAL MEDICINE | Facility: CLINIC | Age: 52
End: 2022-09-19
Payer: COMMERCIAL

## 2022-09-19 DIAGNOSIS — U07.1 COVID-19: Primary | ICD-10-CM

## 2022-09-19 PROCEDURE — 99213 OFFICE O/P EST LOW 20 MIN: CPT | Mod: 95 | Performed by: FAMILY MEDICINE

## 2022-09-19 RX ORDER — BENZONATATE 100 MG/1
100 CAPSULE ORAL 3 TIMES DAILY PRN
Qty: 30 CAPSULE | Refills: 0 | Status: SHIPPED | OUTPATIENT
Start: 2022-09-19 | End: 2022-09-29

## 2022-09-19 ASSESSMENT — ENCOUNTER SYMPTOMS
SINUS PRESSURE: 1
SHORTNESS OF BREATH: 0
CHILLS: 1
ACTIVITY CHANGE: 1
SORE THROAT: 1
RHINORRHEA: 0
PALPITATIONS: 0
FATIGUE: 1
COUGH: 1
FEVER: 1

## 2022-09-19 NOTE — PROGRESS NOTES
Verification of Patient Location:  The patient affirms they are currently located in the following state: [place]    Request for Consent:    I asked the patient for consent to conduct the visit in the televideo format.     Patient Response to Request for Consent:  Patient consented to televideo visit.       Visit Documentation:  Subjective     Patient ID: JORDAN Cedillo is a 52 y.o. female.  1970      Patient's son had COVID 2 weeks ago then JORDAN began feeling symptoms 9/15/22.  She felt ear fullness/pressure, subjective fevers/chills, sore throat, drainage and fatigue.  Her temp was not elevated.  Her first positive COVID test was today, 9/19/22.  She would like guidance on what to take to relieve her sinus and ear pressure and when she can safely end isolation.            The following have been reviewed and updated as appropriate in this visit:          Review of Systems   Constitutional: Positive for activity change, chills, fatigue and fever.   HENT: Positive for congestion, ear pain, sinus pressure and sore throat. Negative for rhinorrhea.    Respiratory: Positive for cough. Negative for shortness of breath.    Cardiovascular: Negative for chest pain and palpitations.     Exam:    No acute distress  EYES:  EOMI, sclera -white  MOUTH: oropharynx- moist  PULMONARY:  No audible wheezing,  Coughing throughout exam, was able to finish sentences without distress.       Assessment/Plan   Diagnoses and all orders for this visit:    COVID-19 (Primary)    Other orders  -     nirmatrelvir-ritonavir (PAXLOVID) tablets,dose pack dose package; Take 3 tablets by mouth 2 (two) times a day for 5 days. Take nirmatrelvir 300 mg (TWO 150mg tablets) PO PLUS ritonavir 100 mg (ONE 100mg tablet) by mouth, with all three tablets taken together twice daily  -     benzonatate (TESSALON PERLES) 100 mg capsule; Take 1 capsule (100 mg total) by mouth 3 (three) times a day as needed for cough for up to 10 days.    Continue taking daily  antihistamine, double current dose of flonase to 2 puffs each nostril BID x 3 days then back down to 2 puffs each nostril daily.  If she develops symptoms of sinus infection after above treatments, call office. Stop statin while taking Paxlovid and for 5 days after.   MJ has normal GFR.    Attached [facility]/CDC guidance concerning isolation rules on AVS.    Time Spent:  I spent 25 minutes on this date of service performing the following activities: talking with patient, reviewing her chart and labs, discussing plan and ordering meds.

## 2022-09-19 NOTE — PATIENT INSTRUCTIONS
The CDC states you can retest yourself after your 5 day isolation period but it is not necessary and the result may continue to be positive even after you are no longer contagious.  So, I would follow the advice listed on the information linked on the summary which was taken from the CDC site.     Please stop your rosuvastatin and resume 5 days after your Paxlovid course is finished.

## 2022-09-29 ENCOUNTER — CLINICAL SUPPORT (OUTPATIENT)
Dept: BARIATRICS/WEIGHT MGMT | Facility: CLINIC | Age: 52
End: 2022-09-29
Payer: COMMERCIAL

## 2022-09-29 VITALS — BODY MASS INDEX: 30.73 KG/M2 | WEIGHT: 168 LBS

## 2022-09-29 DIAGNOSIS — Z71.3 DIETARY COUNSELING: ICD-10-CM

## 2022-09-29 DIAGNOSIS — E66.09 CLASS 1 OBESITY DUE TO EXCESS CALORIES WITH SERIOUS COMORBIDITY AND BODY MASS INDEX (BMI) OF 31.0 TO 31.9 IN ADULT: ICD-10-CM

## 2022-09-29 DIAGNOSIS — E66.811 CLASS 1 OBESITY DUE TO EXCESS CALORIES WITH SERIOUS COMORBIDITY AND BODY MASS INDEX (BMI) OF 31.0 TO 31.9 IN ADULT: ICD-10-CM

## 2022-09-29 PROCEDURE — 97802 MEDICAL NUTRITION INDIV IN: CPT | Performed by: DIETITIAN, REGISTERED

## 2022-09-29 NOTE — ASSESSMENT & PLAN NOTE
1. Utilize our PFF [Protein-Fat-Fiber] Framework to ensure proper fueling throughout the day. This meal planning framework helps to provide you with stable blood sugars, sustained energy, and well-controlled appetite when utilized every 3-5 hours within a 12 hour or less eating interval.     Each meal should have 1 protein choice, 1 fat choice, and 1 fiber choice to ensure appetite and blood sugar control.    Prioritize protein with each meal and snack. Protein is needed to keep you feeling full and satisfied between meals, so it tends to help you snack less and avoid cravings for carbohydrate foods. This nutrient also helps with muscle recovery after exercise which can help increase your metabolic rate over time.     Below is a list of recommended plant-based protein sources.     Plant-based powders:  - Tone it Up   - Knowles  - Garden of Life  - Orgain (Vegan or Simple varieties)  - Evolve  - Epicure  - Innosupps  - Aloha  - Ripple    Plant-based ready-to-drink beverages:  - Evolve  - Apres  - Tone it Up  - Orgain  - Aloha  - Garden of Life  - OWYN  - Ripple  - Koia  - Dnomzrn1M (plant-based clear protein drink)    Protein bars:  - RX bar  - IQ bar  - Perfect bar  - RawRev Tiffanie  - Simply Protein (Crispy variety)  - Quest bar (Geronimo variety from [employer] also approved)  - Garden of Life High Protein Weight Loss bar  - [place] Brs   - Sofia Welli Bars  - Square Organics  - Zing Bars      Low-sugar Greek yogurt:  - Siggi's Plant-Based    Pasta / Rice / Snacks:  - Banza Pasta or Rice   - Barilla Chickpea pasta   - RightRice  - Hippies snacks     Other supplements:  - Collagen peptides  - Bone broth  - Devotion Nutrition baking powder  - Simply Protein chips  - Quest Nutrition chips  - [place] or Shrewd Food protein puffs  - Magic Spoon or Schoolyard Snacks protein cereal    2. Commit to daily food journaling on the [employer] tiff. Our program code is 327571 [[facility]; select  "\"Surgical\" or \"Medical/Non-Surgical\" as it applies to your individual weight management journey. Electronic food journaling is used for accountability and to identify patterns in intake that may contribute to your appetite, energy level, and mood. With [employer], your dietitian may view your entries for food, fluid, exercise, and weight from a home scale between visits. They may also provide feedback through a push notification on the tiff.      "

## 2022-09-29 NOTE — PROGRESS NOTES
"DETAILS OF VISIT     Consulting Service:  [facility] Comprehensive Weight and Wellness Program - Dietitian    Referring Physician: Caren Guardado MD    PCP: Caren Guardado MD    Reason for Consultation:   Chief Complaint   Patient presents with    Nutrition Counseling       I met with Aurora Cedillo today for Nutrition Counseling [Z71.3] and dietary education related to the following:    E66.3 - Overweight and E78.4 - Other hyperlipidemia        VISIT DESCRIPTION         Aurora Cedillo is a 52 y.o. female here for initial nutrition evaluation.    Current Weight:   Wt Readings from Last 1 Encounters:   09/29/22 76.2 kg (168 lb)     Height:   Ht Readings from Last 1 Encounters:   09/01/22 1.575 m (5' 2\")        BMI: Body mass index is 30.73 kg/m².  Ideal Body Weight: 110#  Goal/Desired Weight: 150#    Weight Trends:    Wt Readings from Last 3 Encounters:   09/29/22 76.2 kg (168 lb)   09/01/22 76.7 kg (169 lb)   08/23/22 78.1 kg (172 lb 3.2 oz)       BMI Readings from Last 3 Encounters:   09/29/22 30.73 kg/m²   09/01/22 30.91 kg/m²   08/23/22 31.50 kg/m²       Patient seen by this RD for evaluation in the Comprehensive Weight & Wellness Program. During today's visit, we discussed the following:    Nutrition:   Initial CWWP Provider  Torres     Recommended Nutrition Plan  1350 HLD diet + Wellbutrin     Meal Structure  5 or less eating events recommended    Eating Interval  Less than 12 hours recommended   Intake Logging  Healthy Lifestyles paper food journal or electronic (Baritastic, MFP, LoseIt, Carb Manager, etc)    Successes  None to discuss -did not instill any changes after meeting with MD prior to RD visit     Discussed AI PFF framework in detail and encouraged increasing protein intake    Challenges Naked carbs     Grazing     Eating outside of 12 hour window -encouraged focusing on food timing and allowing a 12 hour fasting window. Discussed ways to upgrade coffee to not breakfast (1/2 and 1/2 and stevia) " to have prior to 8am.     Food quality -Sugary beverages, microwave-able potatoes, frozen proteins/lurdes balls, encouraged AI nutrition plan     Cooking -does not like to cook      Food logging -encouraged to log food on Baritastic [place]       Food Allergies  Latex (apple, avocado, banana, carrot, celery, chestnut, kiwi, melons, papaya, raw potato, tomato)    Food Aversions  N/A    Grocery Shopping  Patient    Meal Preparation  Patient (does not like to cook)    Recall 6:30 -coffee with creamer and sugar   8am - Greek yogur t  12pm -grilled cheese, tomato soup   8pm -pasta and meatballs      Physical Activity: No dedicated exercise     Emotional Wellness: Low stress, 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     The following hand-outs and/or educational materials were given: SHENG PFF framework, SHENG welcome packet.     A patient-centered approach using motivational interviewing was used to develop the plan and recommendations outlined below.     Time Spent with Patient for face to face office visit: 1 hour      MEDICATIONS, LABS, AND ALLERGIES     Current Outpatient Medications on File Prior to Visit   Medication Sig Dispense Refill    azelastine (ASTELIN) 137 mcg (0.1 %) nasal spray Administer 1 spray into each nostril 2 (two) times a day. Use in each nostril as directed. 180 mL 3    benzonatate (TESSALON PERLES) 100 mg capsule Take 1 capsule (100 mg total) by mouth 3 (three) times a day as needed for cough for up to 10 days. 30 capsule 0    buPROPion XL (WELLBUTRIN XL) 150 mg 24 hr tablet Take 1 tablet (150 mg total) by mouth daily. With 300mg tabs (total 450mg) 90 tablet 3    buPROPion XL (WELLBUTRIN XL) 300 mg 24 hr tablet Take 1 tablet (300 mg total) by mouth daily. With 150mg tabs (total 450mg) 90 tablet 3    chromium picolinate 200 mcg tablet Take by mouth.      crisaborole (EUCRISA) 2 % ointment Eucrisa 2 % topical ointment      famotidine (PEPCID) 40 mg tablet Take 1 tablet (40 mg total) by mouth 2 (two) times  a day. 180 tablet 1    fluticasone propionate (FLONASE) 50 mcg/actuation nasal spray Administer 1 spray into each nostril 2 (two) times a day. With Astelin 16 g 3    hydrochlorothiazide (HYDRODIURIL) 25 mg tablet Take 0.5 tablets (12.5 mg total) by mouth daily. 90 tablet 3    ipratropium (ATROVENT) 42 mcg (0.06 %) nasal spray Administer 1-2 sprays into each nostril 3 (three) times a day. 45 mL 1    L. acidophilus/pectin, citrus (ACIDOPHILUS PROBIOTIC ORAL) Take 0.5 mg by mouth.      loratadine (CLARITIN) 10 mg tablet Take 10 mg by mouth daily.      losartan (COZAAR) 50 mg tablet Take 1 tablet (50 mg total) by mouth daily. 90 tablet 3    montelukast (SINGULAIR) 10 mg tablet Take 1 tablet (10 mg total) by mouth once daily. 90 tablet 3    rosuvastatin (CRESTOR) 5 mg tablet Take 1 tablet (5 mg total) by mouth daily. 90 tablet 3    spironolactone (ALDACTONE) 100 mg tablet Take 0.5 tablets (50 mg total) by mouth daily. 90 tablet 3    VASCULERA 630 mg tablet Take one tablet by mouth daily 90 tablet 0     No current facility-administered medications on file prior to visit.         Neomycin, Neomycin-bacitracnzn-polymyxnb, Penicillins, Latex, and Miconazole       PATIENT-LED GOALS     Obesity  1. Utilize our PFF [Protein-Fat-Fiber] Framework to ensure proper fueling throughout the day. This meal planning framework helps to provide you with stable blood sugars, sustained energy, and well-controlled appetite when utilized every 3-5 hours within a 12 hour or less eating interval.     Each meal should have 1 protein choice, 1 fat choice, and 1 fiber choice to ensure appetite and blood sugar control.    Prioritize protein with each meal and snack. Protein is needed to keep you feeling full and satisfied between meals, so it tends to help you snack less and avoid cravings for carbohydrate foods. This nutrient also helps with muscle recovery after exercise which can help increase your metabolic rate over time.     Below  "is a list of recommended plant-based protein sources.     Plant-based powders:  - Tone it Up   - Knowles  - Garden of Life  - Orgain (Vegan or Simple varieties)  - Evolve  - Epicure  - Innosupps  - Aloha  - Ripple    Plant-based ready-to-drink beverages:  - Evolve  - Apres  - Tone it Up  - Orgain  - Aloha  - Garden of Life  - OWYN  - Ripple  - Koia  - [id number] (plant-based clear protein drink)    Protein bars:  - RX bar  - IQ bar  - Perfect bar  - RawRev Tiffanie  - Simply Protein (Crispy variety)  - Quest bar (Geronimo variety from [employer] also approved)  - Garden of Life High Protein Weight Loss bar  - [place] Brs   - Sofia Welli Bars  - Square Organics  - Zing Bars      Low-sugar Greek yogurt:  - Siggi's Plant-Based    Pasta / Rice / Snacks:  - Banza Pasta or Rice   - Barilla Chickpea pasta   - RightRice  - Hippies snacks     Other supplements:  - Collagen peptides  - Bone broth  - Devotion Nutrition baking powder  - Simply Protein chips  - Quest Nutrition chips  - Twin Peaks or Shrewd Food protein puffs  - Magic Spoon or Schoolyard Snacks protein cereal    2. Commit to daily food journaling on the [employer] tiff. Our program code is 339073 [[facility] Bariatric Surgery Program; select \"Surgical\" or \"Medical/Non-Surgical\" as it applies to your individual weight management journey. Electronic food journaling is used for accountability and to identify patterns in intake that may contribute to your appetite, energy level, and mood. With [employer], your dietitian may view your entries for food, fluid, exercise, and weight from a home scale between visits. They may also provide feedback through a push notification on the tiff.            Yasemin Pereyra RD, LDN  Registered Dietitian for [facility] Comprehensive Weight and Wellness Program  9/29/2022     Thank you very much for allowing us to participate in the care of your patient. Please do not hesitate to call or email if there are any questions.     "

## 2022-09-29 NOTE — PATIENT INSTRUCTIONS
Problem List Items Addressed This Visit          Endocrine/Metabolic    Obesity     1. Utilize our PFF [Protein-Fat-Fiber] Framework to ensure proper fueling throughout the day. This meal planning framework helps to provide you with stable blood sugars, sustained energy, and well-controlled appetite when utilized every 3-5 hours within a 12 hour or less eating interval.     Each meal should have 1 protein choice, 1 fat choice, and 1 fiber choice to ensure appetite and blood sugar control.    Prioritize protein with each meal and snack. Protein is needed to keep you feeling full and satisfied between meals, so it tends to help you snack less and avoid cravings for carbohydrate foods. This nutrient also helps with muscle recovery after exercise which can help increase your metabolic rate over time.     Below is a list of recommended plant-based protein sources.     Plant-based powders:  - Tone it Up   - Knowles  - Garden of Life  - Orgain (Vegan or Simple varieties)  - Evolve  - Epicure  - Innosupps  - Aloha  - Ripple    Plant-based ready-to-drink beverages:  - Evolve  - Apres  - Tone it Up  - Orgain  - Aloha  - Garden of Life  - OWYN  - Ripple  - Koia  - [id number] (plant-based clear protein drink)    Protein bars:  - RX bar  - IQ bar  - Perfect bar  - RawRev Tiffanie  - Simply Protein (Crispy variety)  - Quest bar (Geronimo variety from [place] also approved)  - Garden of Life High Protein Weight Loss bar  - [place] Brs   - Sofia Welli Bars  - Square Organics  - Zing Bars      Low-sugar Greek yogurt:  - Siggi's Plant-Based    Pasta / Rice / Snacks:  - Banza Pasta or Rice   - Barilla Chickpea pasta   - RightRice  - Hippies snacks     Other supplements:  - Collagen peptides  - Bone broth  - Devotion Nutrition baking powder  - Simply Protein chips  - Quest Nutrition chips  - Twin Peaks or Shrewd Food protein puffs  - Magic Spoon or Schoolyard Snacks protein cereal    2. Commit to daily food journaling on the [employer] tiff. Our  "program code is 768561 [[facility] Bariatric Surgery Program; select \"Surgical\" or \"Medical/Non-Surgical\" as it applies to your individual weight management journey. Electronic food journaling is used for accountability and to identify patterns in intake that may contribute to your appetite, energy level, and mood. With [employer], your dietitian may view your entries for food, fluid, exercise, and weight from a home scale between visits. They may also provide feedback through a push notification on the tiff.               BMI 29.0-29.9,adult - Primary       Other    Dietary counseling            "

## 2022-11-01 ENCOUNTER — OFFICE VISIT (OUTPATIENT)
Dept: INTERNAL MEDICINE | Facility: CLINIC | Age: 52
End: 2022-11-01
Payer: COMMERCIAL

## 2022-11-01 VITALS
DIASTOLIC BLOOD PRESSURE: 76 MMHG | TEMPERATURE: 98.5 F | HEART RATE: 79 BPM | WEIGHT: 170 LBS | SYSTOLIC BLOOD PRESSURE: 110 MMHG | BODY MASS INDEX: 31.28 KG/M2 | RESPIRATION RATE: 17 BRPM | HEIGHT: 62 IN | OXYGEN SATURATION: 99 %

## 2022-11-01 DIAGNOSIS — N93.8 DUB (DYSFUNCTIONAL UTERINE BLEEDING): Primary | ICD-10-CM

## 2022-11-01 DIAGNOSIS — G89.29 CHRONIC LEFT-SIDED LOW BACK PAIN WITHOUT SCIATICA: ICD-10-CM

## 2022-11-01 DIAGNOSIS — I10 ESSENTIAL HYPERTENSION, BENIGN: ICD-10-CM

## 2022-11-01 DIAGNOSIS — R51.9 NONINTRACTABLE EPISODIC HEADACHE, UNSPECIFIED HEADACHE TYPE: ICD-10-CM

## 2022-11-01 DIAGNOSIS — M54.2 NECK PAIN ON LEFT SIDE: ICD-10-CM

## 2022-11-01 DIAGNOSIS — Z01.89 ROUTINE LAB DRAW: ICD-10-CM

## 2022-11-01 DIAGNOSIS — M54.50 CHRONIC LEFT-SIDED LOW BACK PAIN WITHOUT SCIATICA: ICD-10-CM

## 2022-11-01 PROBLEM — U07.1 COVID-19 VIRUS INFECTION: Status: ACTIVE | Noted: 2022-09-19

## 2022-11-01 PROCEDURE — 3008F BODY MASS INDEX DOCD: CPT | Performed by: INTERNAL MEDICINE

## 2022-11-01 PROCEDURE — 99214 OFFICE O/P EST MOD 30 MIN: CPT | Performed by: INTERNAL MEDICINE

## 2022-11-01 RX ORDER — MISOPROSTOL 100 UG/1
TABLET ORAL
COMMUNITY
Start: 2022-10-03 | End: 2023-01-05

## 2022-11-01 RX ORDER — MEDROXYPROGESTERONE ACETATE 10 MG/1
TABLET ORAL
COMMUNITY
Start: 2022-10-06 | End: 2023-01-05

## 2022-11-01 RX ORDER — NORETHINDRONE ACETATE AND ETHINYL ESTRADIOL, ETHINYL ESTRADIOL AND FERROUS FUMARATE 1MG-10(24)
KIT ORAL
COMMUNITY
End: 2022-12-01

## 2022-11-01 ASSESSMENT — ENCOUNTER SYMPTOMS
WEAKNESS: 0
MYALGIAS: 1
UNEXPECTED WEIGHT CHANGE: 0
ABDOMINAL PAIN: 0
LIGHT-HEADEDNESS: 0
HEADACHES: 1
NUMBNESS: 0
RHINORRHEA: 0
FEVER: 0
DIZZINESS: 0
SHORTNESS OF BREATH: 0
ARTHRALGIAS: 1
DIFFICULTY URINATING: 0
SPEECH DIFFICULTY: 0
DYSPHORIC MOOD: 0
SINUS PRESSURE: 1
NERVOUS/ANXIOUS: 1

## 2022-11-01 NOTE — ASSESSMENT & PLAN NOTE
Saw GYN, Dr. Rand.  Work-up thus far negative.  Started low dose OCPs.  Considering higher dose.  May be perimenopausal.  Possibly related to recent COVID infection

## 2022-11-01 NOTE — ASSESSMENT & PLAN NOTE
Intermittent pain and tenderness in the right temporoparietal region.  Likely muscular but will check ESR to rule out possible giant cell arteritis symptoms.  Continue working on exercises.

## 2022-11-01 NOTE — PROGRESS NOTES
Subjective      Patient ID: Aurora Cedillo is a 52 y.o. female.    HPI      Patient presents for 2 mo follow up.  Had COVID-19 infection 6 weeks ago.  Improved with Paxlovid.  A week or two later, started her menstrual period as usual but the bleeding lasted for 2 weeks. Saw GYN and had full work up including pelvic ultrasound and biopsy which were negative. Was prescribed Provera for a week then low-dose OCPs.  Continues to have breakthrough bleeding with the OCPs.  Discussed possible higher dose but has not opted for it yet.  Next follow-up appointment is on 11/22/2022.    Tried taking spironolactone 50 mg but her acne came back as did her eczema.  Resumed the full tablet.  Also takes HCTZ.  Her dermatologist and previous PCP recommended taking both the spironolactone and HCTZ to balance out her potassium.  Denies frequent urination.  Tries to keep up with fluid intake.  Has been noticing intermittent pain and tenderness of her right temporoparietal area.  No associated neurologic deficits or headache.  Having left neck pain and flank pain that comes and goes.  Taking ibuprofen helps. Also takes sudafed which helps with sinus symptoms.  Has been seeing the dietitian at the [facility].  Was started on intermittent fasting.  Has not lost any weight yet.  Next follow-up is tomorrow.  Will be starting yoga.  Whenever she gets into a regular pattern with yoga, her body aches generally improved.  Has not been exercising regularly for a while. No other new/acute concerns          The following have been reviewed and updated as appropriate in this visit:     Allergies  Meds  Problems         Past Medical History:   Diagnosis Date    Allergic rhinitis     Anxiety 5 years ago    Arthritis     Back pain     BMI 40.0-44.9, adult (CMS/[facility])     Cellulitis     COVID-19 virus infection 9/19/2022    Depression 06/28/2012    Dysfunction of both eustachian tubes     Essential hypertension, benign 06/28/2012    GERD  (gastroesophageal reflux disease) After lap band    Gout 15-20 years ago    No longer have    High cholesterol 20 years ago    Hyperlipidemia     Irregular menses     Plantar fasciitis 20 years ago    Right bundle branch block 2012    Sensorineural hearing loss, bilateral     Shingles     on head, age 20's    Sinusitis     Skin abnormalities All my life    Several skin issues some related to allergies, stress    Skin rash     Sleep apnea     Snoring     UTI (urinary tract infection)     Varicose veins of lower extremity     Vitamin D deficiency     Yeast infection      Past Surgical History:   Procedure Laterality Date    CARPAL TUNNEL RELEASE  25 years ago    Corrected with surgery    COLONOSCOPY      KNEE ARTHROSCOPY  15 years ago    KNEE SURGERY  7 years ago    LAPAROSCOPIC GASTRIC BANDING  15 years ago?    Unsure of exact timeframe    PARTIAL KNEE ARTHROPLASTY Left     SINUS SURGERY      VAGINAL DELIVERY       Family History   Problem Relation Age of Onset    Atrial fibrillation Biological Mother     Mental illness Biological Mother     Atrial fibrillation Biological Father     Stroke Biological Father     Heart disease Biological Father     Hypertension Biological Father     Mental illness Biological Father     Obesity Biological Father     Atrial fibrillation Biological Brother     Stroke Biological Brother          of CVA age 52    ADD / ADHD Biological Daughter     Anxiety disorder Biological Daughter     No Known Problems Biological Son     Autism spectrum disorder Biological Son         high functioning    ADD / ADHD Biological Son         oldest, declines meds     Social History     Socioeconomic History    Marital status:      Spouse name: None    Number of children: 3    Years of education: None    Highest education level: None   Occupational History    Occupation: [employer]   Tobacco Use    Smoking status: Former     Packs/day: 0.50     Years:  5.00     Pack years: 2.50     Types: Cigarettes     Start date: 1988     Quit date: 1993     Years since quittin.8    Smokeless tobacco: Never   Vaping Use    Vaping Use: Never used   Substance and Sexual Activity    Alcohol use: Yes     Comment: Varies, drink on occasion wine, beer or vodka 3 x month    Drug use: Not Currently    Sexual activity: Yes     Partners: Male     Birth control/protection: Abstinence, Coitus interruptus/Pulling Out, Condom Male     Comment: Unable to take pill due to side effects.   Social History Narrative    Children- 2 sons (16, 10), 1 daughter (13). 2021    Caffeine - iced tea    Exercise- yoga - will get back to it    Diet-     Pets-     [employer] affiliation-     Lives with spouse and three children       Review of Systems   Constitutional: Negative for fever and unexpected weight change.   HENT: Positive for sinus pressure. Negative for congestion, postnasal drip and rhinorrhea.    Respiratory: Negative for shortness of breath.    Cardiovascular: Negative for chest pain.   Gastrointestinal: Negative for abdominal pain.   Genitourinary: Positive for vaginal bleeding. Negative for difficulty urinating and pelvic pain.   Musculoskeletal: Positive for arthralgias and myalgias.   Allergic/Immunologic: Positive for environmental allergies.   Neurological: Positive for headaches. Negative for dizziness, speech difficulty, weakness, light-headedness and numbness.   Psychiatric/Behavioral: Negative for dysphoric mood. The patient is nervous/anxious.        Allergies   Allergen Reactions    Neomycin Hives    Neomycin-Bacitracnzn-Polymyxnb Itching    Penicillins     Latex Rash    Miconazole Rash     Current Outpatient Medications   Medication Sig Dispense Refill    azelastine (ASTELIN) 137 mcg (0.1 %) nasal spray Administer 1 spray into each nostril 2 (two) times a day. Use in each nostril as directed. 180 mL 3    buPROPion XL (WELLBUTRIN XL) 150 mg 24 hr tablet Take  1 tablet (150 mg total) by mouth daily. With 300mg tabs (total 450mg) 90 tablet 3    buPROPion XL (WELLBUTRIN XL) 300 mg 24 hr tablet Take 1 tablet (300 mg total) by mouth daily. With 150mg tabs (total 450mg) 90 tablet 3    chromium picolinate 200 mcg tablet Take by mouth.      crisaborole (EUCRISA) 2 % ointment Eucrisa 2 % topical ointment      famotidine (PEPCID) 40 mg tablet Take 1 tablet (40 mg total) by mouth 2 (two) times a day. 180 tablet 1    fluticasone propionate (FLONASE) 50 mcg/actuation nasal spray Administer 1 spray into each nostril 2 (two) times a day. With Astelin 16 g 3    hydrochlorothiazide (HYDRODIURIL) 25 mg tablet Take 0.5 tablets (12.5 mg total) by mouth daily. 90 tablet 3    ipratropium (ATROVENT) 42 mcg (0.06 %) nasal spray Administer 1-2 sprays into each nostril 3 (three) times a day. 45 mL 1    L. acidophilus/pectin, citrus (ACIDOPHILUS PROBIOTIC ORAL) Take 0.5 mg by mouth.      loratadine (CLARITIN) 10 mg tablet Take 10 mg by mouth daily.      losartan (COZAAR) 50 mg tablet Take 1 tablet (50 mg total) by mouth daily. 90 tablet 3    miSOPROStoL (CYTOTEC) 100 mcg tablet INSERT HALF TABLET VAGINALLY NIGHT BEFORE THE PROCEDURE      montelukast (SINGULAIR) 10 mg tablet Take 1 tablet (10 mg total) by mouth once daily. 90 tablet 3    rosuvastatin (CRESTOR) 5 mg tablet Take 1 tablet (5 mg total) by mouth daily. 90 tablet 3    spironolactone (ALDACTONE) 100 mg tablet Take 0.5 tablets (50 mg total) by mouth daily. 90 tablet 3    VASCULERA 630 mg tablet Take one tablet by mouth daily 90 tablet 0    medroxyPROGESTERone (PROVERA) 10 mg tablet TAKE 1 TABLET BY MOUTH EVERY DAY FOR 7 DAYS      norethindrone-e.estradioL-iron (LO LOESTRIN FE) 1 mg-10 mcg (24)/10 mcg (2) per tablet Lo Loestrin Fe 1 mg-10 mcg (24)/10 mcg (2) tablet   TAKE 1 TABLET BY MOUTH EVERY DAY       No current facility-administered medications for this visit.       Objective   Vitals:    11/01/22 1010   BP: 110/76  "  Pulse: 79   Resp: 17   Temp: 36.9 °C (98.5 °F)   SpO2: 99%   Weight: 77.1 kg (170 lb)   Height: 1.575 m (5' 2\")     Body mass index is 31.09 kg/m².    Physical Exam  Constitutional:       Appearance: Normal appearance. She is well-developed and well-nourished.   HENT:      Head: Normocephalic and atraumatic.   Eyes:      General: Lids are normal.      Extraocular Movements: EOM normal.   Cardiovascular:      Rate and Rhythm: Normal rate and regular rhythm.      Heart sounds: Normal heart sounds, S1 normal and S2 normal. No murmur heard.  Pulmonary:      Effort: Pulmonary effort is normal.      Breath sounds: Normal breath sounds. No decreased breath sounds, rhonchi or rales.   Abdominal:      General: Bowel sounds are normal.      Palpations: Abdomen is soft.      Tenderness: There is no abdominal tenderness.   Musculoskeletal:      Cervical back: Neck supple.   Skin:     General: Skin is warm and dry.   Neurological:      General: No focal deficit present.      Mental Status: She is alert and oriented to person, place, and time.      Cranial Nerves: No cranial nerve deficit.      Motor: Motor strength is normal.      Gait: Gait normal.   Psychiatric:         Mood and Affect: Mood and affect and mood normal.         Behavior: Behavior normal.         Thought Content: Thought content normal.         Cognition and Memory: Cognition and memory normal.         Judgment: Judgment normal.         Assessment/Plan   Problem List Items Addressed This Visit        Nervous    Nonintractable episodic headache     Intermittent pain and tenderness in the right temporoparietal region.  Likely muscular but will check ESR to rule out possible giant cell arteritis symptoms.  Continue working on exercises.         Relevant Orders    Sedimentation rate    Lyme Disease Abs, Immunoblot    Neck pain on left side     Will check x-ray and refer to physical therapy         Relevant Orders    X-RAY CERVICAL SPINE COMPLETE 4 OR 5 VIEWS    " Ambulatory referral to Physical Therapy       Circulatory    Essential hypertension, benign     Well-controlled on losartan 50 mg daily and HCTZ 12.5 mg daily.  Also take spironolactone 100 mg for adult acne.  States she has been on these medications chronically for a long time.  Labs stable            Genitourinary    DUB (dysfunctional uterine bleeding) - Primary     Saw GYN, Dr. Rand.  Work-up thus far negative.  Started low dose OCPs.  Considering higher dose.  May be perimenopausal.  Possibly related to recent COVID infection            Other    Routine lab draw    Relevant Orders    CBC and Differential    Comprehensive metabolic panel    Lipid panel    TSH w reflex FT4    Chronic left-sided low back pain without sciatica     Left lower back/flank.  Will check x-ray and refer to physical therapy         Relevant Orders    Ambulatory referral to Physical Therapy    X-RAY LUMBAR SPINE COMPLETE 4+ VIEWS       Caren Guardado MD    11/1/2022

## 2022-11-01 NOTE — ASSESSMENT & PLAN NOTE
Well-controlled on losartan 50 mg daily and HCTZ 12.5 mg daily.  Also take spironolactone 100 mg for adult acne.  States she has been on these medications chronically for a long time.  Labs stable

## 2022-11-02 ENCOUNTER — HOSPITAL ENCOUNTER (OUTPATIENT)
Dept: RADIOLOGY | Age: 52
Discharge: HOME | End: 2022-11-02
Attending: INTERNAL MEDICINE
Payer: COMMERCIAL

## 2022-11-02 ENCOUNTER — CLINICAL SUPPORT (OUTPATIENT)
Dept: BARIATRICS/WEIGHT MGMT | Facility: CLINIC | Age: 52
End: 2022-11-02
Payer: COMMERCIAL

## 2022-11-02 VITALS — BODY MASS INDEX: 31.18 KG/M2 | WEIGHT: 170.5 LBS

## 2022-11-02 DIAGNOSIS — G89.29 CHRONIC LEFT-SIDED LOW BACK PAIN WITHOUT SCIATICA: ICD-10-CM

## 2022-11-02 DIAGNOSIS — M54.50 CHRONIC LEFT-SIDED LOW BACK PAIN WITHOUT SCIATICA: ICD-10-CM

## 2022-11-02 DIAGNOSIS — R60.9 LIPEDEMA: ICD-10-CM

## 2022-11-02 DIAGNOSIS — M54.2 NECK PAIN ON LEFT SIDE: ICD-10-CM

## 2022-11-02 DIAGNOSIS — E66.811 CLASS 1 OBESITY DUE TO EXCESS CALORIES WITH SERIOUS COMORBIDITY AND BODY MASS INDEX (BMI) OF 31.0 TO 31.9 IN ADULT: Primary | ICD-10-CM

## 2022-11-02 DIAGNOSIS — E66.09 CLASS 1 OBESITY DUE TO EXCESS CALORIES WITH SERIOUS COMORBIDITY AND BODY MASS INDEX (BMI) OF 31.0 TO 31.9 IN ADULT: Primary | ICD-10-CM

## 2022-11-02 DIAGNOSIS — Z71.3 DIETARY COUNSELING: ICD-10-CM

## 2022-11-02 DIAGNOSIS — E78.49 OTHER HYPERLIPIDEMIA: ICD-10-CM

## 2022-11-02 PROCEDURE — 72110 X-RAY EXAM L-2 SPINE 4/>VWS: CPT

## 2022-11-02 PROCEDURE — 97803 MED NUTRITION INDIV SUBSEQ: CPT | Performed by: DIETITIAN, REGISTERED

## 2022-11-02 PROCEDURE — 72050 X-RAY EXAM NECK SPINE 4/5VWS: CPT

## 2022-11-02 NOTE — PATIENT INSTRUCTIONS
Problem List Items Addressed This Visit          Endocrine/Metabolic    Obesity - Primary     1. Dedicate one day per week to yoga   2. Orin with recipes and writing down dinners in monthly meal plan             Dermatologic    Lipedema       Other    Hyperlipidemia    Dietary counseling

## 2022-11-02 NOTE — PROGRESS NOTES
"DETAILS OF VISIT     Consulting Service:  [facility] Comprehensive Weight and Wellness Program - Dietitian    Referring Physician: Caren Guardado MD    PCP: Caren Guardado MD    Reason for Consultation:   Chief Complaint   Patient presents with    Nutrition Counseling       I met with Aurora Cedillo today for Nutrition Counseling [Z71.3] and dietary education related to the following:    E66.9 - Obesity, unspecified - obesity NOS and Z68.31 - BMI 31.0-31.9, adult       VISIT DESCRIPTION         Aurora Cedillo is a 52 y.o. female here for nutrition follow-up.    Current Weight:   Wt Readings from Last 1 Encounters:   11/02/22 77.3 kg (170 lb 8 oz)     Height:   Ht Readings from Last 1 Encounters:   11/01/22 1.575 m (5' 2\")        BMI: Body mass index is 31.18 kg/m².    Weight Trends:    Wt Readings from Last 3 Encounters:   11/02/22 77.3 kg (170 lb 8 oz)   11/01/22 77.1 kg (170 lb)   09/29/22 76.2 kg (168 lb)       BMI Readings from Last 3 Encounters:   11/02/22 31.18 kg/m²   11/01/22 31.09 kg/m²   09/29/22 30.73 kg/m²       Patient seen by this RD for evaluation in the Comprehensive Weight & Wellness Program. During today's visit, we discussed the following:    Nutrition, Physical Activity, & Emotional Wellness:   Recommended Nutrition Plan  1350 HLD diet + Wellbutrin    Meal Structure  5 or less eating events recommended   Eating Interval  Less than 12 hours recommended   Intake Logging  Healthy Lifestyles paper food journal or electronic (Baritastic, MFP, LoseIt, Carb Manager, etc)    Successes  None to discuss -very non-compliant to making changes, in pre-contemplation stage of making a change.     Food type-encouraged dairy and gluten free     Food timing -eating between 12pm-8pm, black tea with stevia is not breaking fast     Medication -taking Wellbutrin as prescribed    Challenges Emotional wellness -high stress, father is sick and out of state. Little time dedicated to herself. Discussed impact cortisol " has on weight and wellness.     Dedicated exercise -likes yoga, little motivation to start exercising routinely. Encouraged one day per week dedicated to yoga.      Cooking -does not like to cook, recommended monthly meal plain with recipes sent to assist with decreasing anxiety associated with cooking       Food logging -inconsistent with Baritastic [place]      Allergies:   Latex (apple, avocado, banana, carrot, celery, chestnut, kiwi, melons, papaya, raw potato, tomato)     Education Provided Navigating obstacles listed above and outlined in goals below     The following hand-outs and/or educational materials were given: Recipes and monthly meal calender     A patient-centered approach using motivational interviewing was used to develop the plan and recommendations outlined below.     Time Spent with Patient for face to face office visit: 30 minutes      MEDICATIONS, LABS, AND ALLERGIES     Current Outpatient Medications on File Prior to Visit   Medication Sig Dispense Refill    azelastine (ASTELIN) 137 mcg (0.1 %) nasal spray Administer 1 spray into each nostril 2 (two) times a day. Use in each nostril as directed. 180 mL 3    buPROPion XL (WELLBUTRIN XL) 150 mg 24 hr tablet Take 1 tablet (150 mg total) by mouth daily. With 300mg tabs (total 450mg) 90 tablet 3    buPROPion XL (WELLBUTRIN XL) 300 mg 24 hr tablet Take 1 tablet (300 mg total) by mouth daily. With 150mg tabs (total 450mg) 90 tablet 3    chromium picolinate 200 mcg tablet Take by mouth.      crisaborole (EUCRISA) 2 % ointment Eucrisa 2 % topical ointment      famotidine (PEPCID) 40 mg tablet Take 1 tablet (40 mg total) by mouth 2 (two) times a day. 180 tablet 1    fluticasone propionate (FLONASE) 50 mcg/actuation nasal spray Administer 1 spray into each nostril 2 (two) times a day. With Astelin 16 g 3    hydrochlorothiazide (HYDRODIURIL) 25 mg tablet Take 0.5 tablets (12.5 mg total) by mouth daily. 90 tablet 3    ipratropium (ATROVENT) 42 mcg  (0.06 %) nasal spray Administer 1-2 sprays into each nostril 3 (three) times a day. 45 mL 1    L. acidophilus/pectin, citrus (ACIDOPHILUS PROBIOTIC ORAL) Take 0.5 mg by mouth.      loratadine (CLARITIN) 10 mg tablet Take 10 mg by mouth daily.      losartan (COZAAR) 50 mg tablet Take 1 tablet (50 mg total) by mouth daily. 90 tablet 3    medroxyPROGESTERone (PROVERA) 10 mg tablet TAKE 1 TABLET BY MOUTH EVERY DAY FOR 7 DAYS      miSOPROStoL (CYTOTEC) 100 mcg tablet INSERT HALF TABLET VAGINALLY NIGHT BEFORE THE PROCEDURE      montelukast (SINGULAIR) 10 mg tablet Take 1 tablet (10 mg total) by mouth once daily. 90 tablet 3    norethindrone-e.estradioL-iron (LO LOESTRIN FE) 1 mg-10 mcg (24)/10 mcg (2) per tablet Lo Loestrin Fe 1 mg-10 mcg (24)/10 mcg (2) tablet   TAKE 1 TABLET BY MOUTH EVERY DAY      rosuvastatin (CRESTOR) 5 mg tablet Take 1 tablet (5 mg total) by mouth daily. 90 tablet 3    spironolactone (ALDACTONE) 100 mg tablet Take 0.5 tablets (50 mg total) by mouth daily. 90 tablet 3    VASCULERA 630 mg tablet Take one tablet by mouth daily 90 tablet 0     No current facility-administered medications on file prior to visit.         Neomycin, Neomycin-bacitracnzn-polymyxnb, Penicillins, Latex, and Miconazole       PATIENT-LED GOALS     Obesity  1. Dedicate one day per week to yoga   2. [place] with recipes and writing down dinners in monthly meal plan         Yasemin Pereyra, ISAEL, LDN  Registered Dietitian for [facility] Program  11/2/2022     Thank you very much for allowing us to participate in the care of your patient. Please do not hesitate to call or email if there are any questions.

## 2022-11-02 NOTE — ASSESSMENT & PLAN NOTE
1. Dedicate one day per week to yoga   2. [place] with recipes and writing down dinners in monthly meal plan    OPCM Case placed on PAUSE; CTT will be following.

## 2022-11-03 ENCOUNTER — TELEPHONE (OUTPATIENT)
Dept: INTERNAL MEDICINE | Facility: CLINIC | Age: 52
End: 2022-11-03

## 2022-11-03 DIAGNOSIS — Z12.31 BREAST CANCER SCREENING BY MAMMOGRAM: ICD-10-CM

## 2022-11-04 LAB
ALBUMIN SERPL-MCNC: 4.5 G/DL (ref 3.8–4.9)
ALBUMIN/GLOB SERPL: 1.9 {RATIO} (ref 1.2–2.2)
ALP SERPL-CCNC: 49 IU/L (ref 44–121)
ALT SERPL-CCNC: 6 IU/L (ref 0–32)
AST SERPL-CCNC: 12 IU/L (ref 0–40)
B BURGDOR IGG PATRN SER IB-IMP: NEGATIVE
B BURGDOR IGM PATRN SER IB-IMP: NEGATIVE
B BURGDOR18KD IGG SER QL IB: ABNORMAL
B BURGDOR23KD IGG SER QL IB: ABNORMAL
B BURGDOR23KD IGM SER QL IB: ABNORMAL
B BURGDOR28KD IGG SER QL IB: ABNORMAL
B BURGDOR30KD IGG SER QL IB: ABNORMAL
B BURGDOR39KD IGG SER QL IB: ABNORMAL
B BURGDOR39KD IGM SER QL IB: ABNORMAL
B BURGDOR41KD IGG SER QL IB: PRESENT
B BURGDOR41KD IGM SER QL IB: ABNORMAL
B BURGDOR45KD IGG SER QL IB: ABNORMAL
B BURGDOR58KD IGG SER QL IB: ABNORMAL
B BURGDOR66KD IGG SER QL IB: ABNORMAL
B BURGDOR93KD IGG SER QL IB: ABNORMAL
BASOPHILS # BLD AUTO: 0.1 X10E3/UL (ref 0–0.2)
BASOPHILS NFR BLD AUTO: 1 %
BILIRUB SERPL-MCNC: 0.5 MG/DL (ref 0–1.2)
BUN SERPL-MCNC: 11 MG/DL (ref 6–24)
BUN/CREAT SERPL: 15 (ref 9–23)
CALCIUM SERPL-MCNC: 9.1 MG/DL (ref 8.7–10.2)
CHLORIDE SERPL-SCNC: 99 MMOL/L (ref 96–106)
CHOLEST SERPL-MCNC: 161 MG/DL (ref 100–199)
CO2 SERPL-SCNC: 22 MMOL/L (ref 20–29)
CREAT SERPL-MCNC: 0.74 MG/DL (ref 0.57–1)
EGFRCR SERPLBLD CKD-EPI 2021: 97 ML/MIN/1.73
EOSINOPHIL # BLD AUTO: 0.1 X10E3/UL (ref 0–0.4)
EOSINOPHIL NFR BLD AUTO: 1 %
ERYTHROCYTE [DISTWIDTH] IN BLOOD BY AUTOMATED COUNT: 12.3 % (ref 11.7–15.4)
ERYTHROCYTE [SEDIMENTATION RATE] IN BLOOD BY WESTERGREN METHOD: 2 MM/HR (ref 0–40)
GLOBULIN SER CALC-MCNC: 2.4 G/DL (ref 1.5–4.5)
GLUCOSE SERPL-MCNC: 83 MG/DL (ref 70–99)
HCT VFR BLD AUTO: 42.1 % (ref 34–46.6)
HDLC SERPL-MCNC: 57 MG/DL
HGB BLD-MCNC: 13.9 G/DL (ref 11.1–15.9)
IMM GRANULOCYTES # BLD AUTO: 0 X10E3/UL (ref 0–0.1)
IMM GRANULOCYTES NFR BLD AUTO: 0 %
LDLC SERPL CALC-MCNC: 85 MG/DL (ref 0–99)
LYMPHOCYTES # BLD AUTO: 1.7 X10E3/UL (ref 0.7–3.1)
LYMPHOCYTES NFR BLD AUTO: 25 %
MCH RBC QN AUTO: 29.7 PG (ref 26.6–33)
MCHC RBC AUTO-ENTMCNC: 33 G/DL (ref 31.5–35.7)
MCV RBC AUTO: 90 FL (ref 79–97)
MONOCYTES # BLD AUTO: 0.5 X10E3/UL (ref 0.1–0.9)
MONOCYTES NFR BLD AUTO: 8 %
NEUTROPHILS # BLD AUTO: 4.5 X10E3/UL (ref 1.4–7)
NEUTROPHILS NFR BLD AUTO: 65 %
PLATELET # BLD AUTO: 295 X10E3/UL (ref 150–450)
POTASSIUM SERPL-SCNC: 4.4 MMOL/L (ref 3.5–5.2)
PROT SERPL-MCNC: 6.9 G/DL (ref 6–8.5)
RBC # BLD AUTO: 4.68 X10E6/UL (ref 3.77–5.28)
SODIUM SERPL-SCNC: 136 MMOL/L (ref 134–144)
T4 FREE SERPL-MCNC: 1.32 NG/DL (ref 0.82–1.77)
TRIGL SERPL-MCNC: 102 MG/DL (ref 0–149)
TSH SERPL DL<=0.005 MIU/L-ACNC: 2.3 UIU/ML (ref 0.45–4.5)
VLDLC SERPL CALC-MCNC: 19 MG/DL (ref 5–40)
WBC # BLD AUTO: 6.9 X10E3/UL (ref 3.4–10.8)

## 2022-11-04 NOTE — TELEPHONE ENCOUNTER
Please let pt know she has a lot of arthritis and disc disease in both her cervical and lumbar spine.  She has a very mild scoliosis of her lumbar spine.  She is recommended to start physical therapy.  If not better, I will refer her to ortho-spine specialist.  She should have the order for PT.   yes

## 2022-11-16 ENCOUNTER — TELEPHONE (OUTPATIENT)
Dept: BARIATRICS/WEIGHT MGMT | Facility: CLINIC | Age: 52
End: 2022-11-16

## 2022-11-16 NOTE — TELEPHONE ENCOUNTER
Patient would like a call back with recommendations. Patient states she is gaining weight instead of loosing weight. She is doing the intermittent fasting with 12/12

## 2022-11-28 RX ORDER — DIOSMIN COMPLEX NO.1 630 MG
1 TABLET ORAL DAILY
Qty: 90 TABLET | Refills: 0 | Status: SHIPPED | OUTPATIENT
Start: 2022-11-28 | End: 2022-11-30

## 2022-11-28 NOTE — TELEPHONE ENCOUNTER
Medicine Refill Request    Last Office: 8/23/2022   Last Consult Visit: Visit date not found  Last Telemedicine Visit: Visit date not found    Next Appointment: 12/1/2022      Current Outpatient Medications:     azelastine (ASTELIN) 137 mcg (0.1 %) nasal spray, Administer 1 spray into each nostril 2 (two) times a day. Use in each nostril as directed., Disp: 180 mL, Rfl: 3    buPROPion XL (WELLBUTRIN XL) 150 mg 24 hr tablet, Take 1 tablet (150 mg total) by mouth daily. With 300mg tabs (total 450mg), Disp: 90 tablet, Rfl: 3    buPROPion XL (WELLBUTRIN XL) 300 mg 24 hr tablet, Take 1 tablet (300 mg total) by mouth daily. With 150mg tabs (total 450mg), Disp: 90 tablet, Rfl: 3    chromium picolinate 200 mcg tablet, Take by mouth., Disp: , Rfl:     crisaborole (EUCRISA) 2 % ointment, Eucrisa 2 % topical ointment, Disp: , Rfl:     famotidine (PEPCID) 40 mg tablet, Take 1 tablet (40 mg total) by mouth 2 (two) times a day., Disp: 180 tablet, Rfl: 1    fluticasone propionate (FLONASE) 50 mcg/actuation nasal spray, Administer 1 spray into each nostril 2 (two) times a day. With Astelin, Disp: 16 g, Rfl: 3    hydrochlorothiazide (HYDRODIURIL) 25 mg tablet, Take 0.5 tablets (12.5 mg total) by mouth daily., Disp: 90 tablet, Rfl: 3    ipratropium (ATROVENT) 42 mcg (0.06 %) nasal spray, Administer 1-2 sprays into each nostril 3 (three) times a day., Disp: 45 mL, Rfl: 1    L. acidophilus/pectin, citrus (ACIDOPHILUS PROBIOTIC ORAL), Take 0.5 mg by mouth., Disp: , Rfl:     loratadine (CLARITIN) 10 mg tablet, Take 10 mg by mouth daily., Disp: , Rfl:     losartan (COZAAR) 50 mg tablet, Take 1 tablet (50 mg total) by mouth daily., Disp: 90 tablet, Rfl: 3    medroxyPROGESTERone (PROVERA) 10 mg tablet, TAKE 1 TABLET BY MOUTH EVERY DAY FOR 7 DAYS, Disp: , Rfl:     miSOPROStoL (CYTOTEC) 100 mcg tablet, INSERT HALF TABLET VAGINALLY NIGHT BEFORE THE PROCEDURE, Disp: , Rfl:     montelukast (SINGULAIR) 10 mg tablet, Take 1 tablet  (10 mg total) by mouth once daily., Disp: 90 tablet, Rfl: 3    norethindrone-e.estradioL-iron (LO LOESTRIN FE) 1 mg-10 mcg (24)/10 mcg (2) per tablet, Lo Loestrin Fe 1 mg-10 mcg (24)/10 mcg (2) tablet  TAKE 1 TABLET BY MOUTH EVERY DAY, Disp: , Rfl:     rosuvastatin (CRESTOR) 5 mg tablet, Take 1 tablet (5 mg total) by mouth daily., Disp: 90 tablet, Rfl: 3    spironolactone (ALDACTONE) 100 mg tablet, Take 0.5 tablets (50 mg total) by mouth daily., Disp: 90 tablet, Rfl: 3    VASCULERA 630 mg tablet, Take one tablet by mouth daily, Disp: 90 tablet, Rfl: 0      BP Readings from Last 3 Encounters:   11/01/22 110/76   09/01/22 120/76   08/23/22 112/60       Recent Lab results:  Lab Results   Component Value Date    CHOL 161 11/03/2022   ,   Lab Results   Component Value Date    HDL 57 11/03/2022   ,   Lab Results   Component Value Date    LDLCALC 85 11/03/2022   ,   Lab Results   Component Value Date    TRIG 102 11/03/2022        Lab Results   Component Value Date    GLUCOSE 83 11/03/2022   , No results found for: HGBA1C      Lab Results   Component Value Date    CREATININE 0.74 11/03/2022       Lab Results   Component Value Date    TSH 2.300 11/03/2022

## 2022-11-28 NOTE — TELEPHONE ENCOUNTER
Last Medical provider visit: 8/23/22SM          Last Dietitian or EP visit: 11/2/22SB   Next visit: 12/1/22SM  Comments:

## 2022-11-30 RX ORDER — DIOSMIN COMPLEX NO.1 630 MG
TABLET ORAL
Qty: 90 TABLET | Refills: 0 | Status: SHIPPED | OUTPATIENT
Start: 2022-11-30 | End: 2023-03-06 | Stop reason: ALTCHOICE

## 2022-12-01 ENCOUNTER — OFFICE VISIT (OUTPATIENT)
Dept: BARIATRICS/WEIGHT MGMT | Facility: CLINIC | Age: 52
End: 2022-12-01
Payer: COMMERCIAL

## 2022-12-01 VITALS
RESPIRATION RATE: 18 BRPM | BODY MASS INDEX: 31.04 KG/M2 | SYSTOLIC BLOOD PRESSURE: 122 MMHG | OXYGEN SATURATION: 98 % | HEART RATE: 80 BPM | HEIGHT: 62 IN | DIASTOLIC BLOOD PRESSURE: 80 MMHG | WEIGHT: 168.7 LBS

## 2022-12-01 DIAGNOSIS — R60.9 LIPEDEMA: Primary | ICD-10-CM

## 2022-12-01 DIAGNOSIS — E66.811 CLASS 1 OBESITY DUE TO EXCESS CALORIES WITH SERIOUS COMORBIDITY AND BODY MASS INDEX (BMI) OF 31.0 TO 31.9 IN ADULT: ICD-10-CM

## 2022-12-01 DIAGNOSIS — E66.09 CLASS 1 OBESITY DUE TO EXCESS CALORIES WITH SERIOUS COMORBIDITY AND BODY MASS INDEX (BMI) OF 31.0 TO 31.9 IN ADULT: ICD-10-CM

## 2022-12-01 PROCEDURE — 3008F BODY MASS INDEX DOCD: CPT | Performed by: FAMILY MEDICINE

## 2022-12-01 PROCEDURE — 99214 OFFICE O/P EST MOD 30 MIN: CPT | Performed by: FAMILY MEDICINE

## 2022-12-01 RX ORDER — METFORMIN HYDROCHLORIDE 500 MG/1
1500 TABLET, EXTENDED RELEASE ORAL
Qty: 90 TABLET | Refills: 0 | Status: SHIPPED | OUTPATIENT
Start: 2022-12-01 | End: 2023-03-06

## 2022-12-01 RX ORDER — SEMAGLUTIDE 1.34 MG/ML
0.5 INJECTION, SOLUTION SUBCUTANEOUS
Qty: 1.5 ML | Refills: 0 | Status: SHIPPED | OUTPATIENT
Start: 2022-12-01 | End: 2022-12-31

## 2022-12-01 NOTE — PATIENT INSTRUCTIONS
"Problem List Items Addressed This Visit          Endocrine/Metabolic    Obesity     Excellent improvement in activity, fasting, and water intake.     Begin to monitor nutrition vs health benefits.      Metformin is not a \"weight loss\" medication, but helps to manage the most common risk factor for weight gain --- \"insulin resistance.\"  When we are able to decrease the amouunt of insulin in our body, we can reverse the \"store fat\" command that insulin delivers to our liver.     Start with 1 tab daily for at least 2 weeks before the evening meal.  After 2 weeks, increase to 2 tabs daily with the evening meal if you are tolerating it well. If you tolerate 2 tabs once daily, please increase to 3 tabs once daily.  1500 mg is the dose we would like to achieve, but if your body tolerates a lower dose, and gets stomach upset with a higher dose for more than 1 week, please return to the lower dose.    Side Effects: Most common side effects are upset stomach or loose bowels (this happens to approximately 10% of patients).  If this side effect occurs, it will generally get better in 1 to 2 weeks, so if it is mild and tolerable, please continue the medication.  If you develop severe stomach upset or diarrhea, it is okay to discontinue the medication.    Metformin comes in 2 formulations, immediate release and extended release.  Most people tolerate extended release better, but this is not always the case.    If you have any side effects to the extended release, we can initiate immediate release to see if that is better tolerated.     In the event of a \"GI\" bug or if you need contrast dye for any reason, you will need to stop metformin for 3-4 days.                   Dermatologic    Lipedema - Primary     Continue vasculera and add metformin.      Continue with home exercise program -- yoga or pilates.      Consider adding in dry brushing.               Excellent candidate for GLP-1 as there are no pregnancy plans, no history of " pancreatitis, and no personal or family history of medullary thyroid tumors.  Educated on long-term nature of this medication, we do see weight regain with discontinuation of medication.  Successful weight loss with this medication begins at 5% body weight loss, but we can expect up to 20% body weight loss with this intervention.  These medications must be used in combination with proper nutrition, regular physical activity, and proper sleep and stress management.      Discussed most common side effects of nausea and constipation.  Generally nausea can be helped by having small frequent meals.  Additionally, krysta tea can be helpful in settling the stomach.    [facility] [place] is a [place] [facility] for nest prices for Branded medications that are not covered by insurance such as Saxenda and Ozempic.       May consider ozempic in the future.   Ozempic needs to be increased slowly over weeks to reduce the side effects of nausea and constipation.     Ozempic needs to be stored in the refrigerator.    Starting dose for Ozempic 0.25 mg once weekly for 4 weeks.  After the first 4 doses (1 month), if you are not experiencing any nausea, then you may increase to 0.5 mg weekly.   The Ozempic starter pen has enough medicine for 4 weeks of 0.25 mg, and 2 weeks of 0.5 mg weekly  Once you are on the 0.5 mg weekly dose, there will be 4 doses per pen.  You remain on this dose until you visit with your provider.    Please visit the Ozempic website for discount coupons, educational support, and reminders on how to properly dose your medication.    You can take Ozempic® with or without food.  You may change the day of the week you use Ozempic® as long as your last dose was taken 2 or more days before.   If you miss a dose of Ozempic®, take the missed dose as soon as possible within 5 days after the missed dose. If more than 5 days have passed, skip the missed dose and take your next dose on the regularly scheduled day.    Using the pen, Ozempic® is injected under the skin of your abdomen, thigh, or upper arm. Do not inject into a muscle or vein.   Change (rotate) your injection site with each injection. Do not use the same site for each injection. If you choose to inject in the same area, always use a different spot in that area.    Most common side effects are nausea and fullness --  This gets better with time. You could get a small lump at the injection site.  You should not use this medicine if you have a history of pancreatitis, MEN2 syndrome, or kidney problems.  This medicine should not be used in a woman of childbearing age who is planning on becoming pregnant or is nursing.  Ozempic needs to be discontinued at least 2 months and preferably three or more months prior to conception.

## 2022-12-01 NOTE — ASSESSMENT & PLAN NOTE
"Excellent improvement in activity, fasting, and water intake.     4 pound weight reduction (maximum weight 260 pounds-any weight loss at this point is excellent)    Begin to monitor nutrition vs health benefits.  This will allow behavior change to happen more seamlessly and less forced.    Educated on metformin and anti-inflammatory effects, has some at home from her daughter, will initiate what she has at home, written prescription provided.    Metformin is not a \"weight loss\" medication, but helps to manage the most common risk factor for weight gain --- \"insulin resistance.\"  When we are able to decrease the amouunt of insulin in our body, we can reverse the \"store fat\" command that insulin delivers to our liver.     Start with 1 tab daily for at least 2 weeks before the evening meal.  After 2 weeks, increase to 2 tabs daily with the evening meal if you are tolerating it well. If you tolerate 2 tabs once daily, please increase to 3 tabs once daily.  1500 mg is the dose we would like to achieve, but if your body tolerates a lower dose, and gets stomach upset with a higher dose for more than 1 week, please return to the lower dose.    Side Effects: Most common side effects are upset stomach or loose bowels (this happens to approximately 10% of patients).  If this side effect occurs, it will generally get better in 1 to 2 weeks, so if it is mild and tolerable, please continue the medication.  If you develop severe stomach upset or diarrhea, it is okay to discontinue the medication.    Metformin comes in 2 formulations, immediate release and extended release.  Most people tolerate extended release better, but this is not always the case.    If you have any side effects to the extended release, we can initiate immediate release to see if that is better tolerated.     In the event of a \"GI\" bug or if you need contrast dye for any reason, you will need to stop metformin for 3-4 days.     I spent 30 minutes on this date " of service performing the following activities: obtaining history, entering orders, documenting, preparing for visit, obtaining / reviewing records and providing counseling and education.

## 2022-12-01 NOTE — ASSESSMENT & PLAN NOTE
Education on the lymphatic system and how it plays with weight regulation, specifically inflammation.    Continue with intermittent fasting.    Continue vasculera and add metformin.      Continue with home exercise program -- yoga or pilates.      Consider adding in dry brushing.     Provided information on GLP-1 agents.     Excellent candidate for GLP-1 as there are no pregnancy plans, no history of pancreatitis, and no personal or family history of medullary thyroid tumors.  Educated on long-term nature of this medication, we do see weight regain with discontinuation of medication.  Successful weight loss with this medication begins at 5% body weight loss, but we can expect up to 20% body weight loss with this intervention.  These medications must be used in combination with proper nutrition, regular physical activity, and proper sleep and stress management.      Discussed most common side effects of nausea and constipation.  Generally nausea can be helped by having small frequent meals.  Additionally, krysta tea can be helpful in settling the stomach.

## 2022-12-01 NOTE — PROGRESS NOTES
HISTORY OF PRESENT ILLNESS        Aurora Cedillo is a 52 y.o. female following up on lifestyle management and weight loss as adjunctive treatment strategies for Lipedema and History of Bariatric surgery.      Sensitivity to touch still present, making improvements in caloric balance and nutrition, but not avoiding high estrogen or inflammatory foods.    Prior visit recap:  CWWP intake: 08/23/22  Weight : 172.2         BF%: 37.2   REE: 1340  Lipedema education-start Vasculera, insulin resistant education, PFF plan given, restart yoga    Medications used to support lifestyle modifications:None  Supplements used to support lifestyle modification:Diosmin    Interval history:   Chart reviewed since our last visit.    Reviewed most recent labs and Reviewed last Registered Dietitian's notes    Updates/Concerns: Has neck and back pain that is limiting her with Yoga and ability to do things.  She is doing physical therapy and she is noting better mobility.      Nutrition:   Fasting for 12 hours or more most days, Getting 64+ oz water daily and Hitting protein goals most days   She has not yet incorporated limited dairy or gluten in her plan yet.      Physical activity:  Currently in physical therapy and improving.      Wellness:   Sleeping 7 or more hours a night on average    Current stress levels are high and Contributing factors include aging parent.        Current Outpatient Medications on File Prior to Visit   Medication Sig Dispense Refill   • azelastine (ASTELIN) 137 mcg (0.1 %) nasal spray Administer 1 spray into each nostril 2 (two) times a day. Use in each nostril as directed. 180 mL 3   • buPROPion XL (WELLBUTRIN XL) 150 mg 24 hr tablet Take 1 tablet (150 mg total) by mouth daily. With 300mg tabs (total 450mg) 90 tablet 3   • buPROPion XL (WELLBUTRIN XL) 300 mg 24 hr tablet Take 1 tablet (300 mg total) by mouth daily. With 150mg tabs (total 450mg) 90 tablet 3   • chromium picolinate 200 mcg tablet Take by  "mouth.     • crisaborole (EUCRISA) 2 % ointment Eucrisa 2 % topical ointment     • famotidine (PEPCID) 40 mg tablet Take 1 tablet (40 mg total) by mouth 2 (two) times a day. 180 tablet 1   • fluticasone propionate (FLONASE) 50 mcg/actuation nasal spray Administer 1 spray into each nostril 2 (two) times a day. With Astelin 16 g 3   • hydrochlorothiazide (HYDRODIURIL) 25 mg tablet Take 0.5 tablets (12.5 mg total) by mouth daily. 90 tablet 3   • ipratropium (ATROVENT) 42 mcg (0.06 %) nasal spray Administer 1-2 sprays into each nostril 3 (three) times a day. 45 mL 1   • L. acidophilus/pectin, citrus (ACIDOPHILUS PROBIOTIC ORAL) Take 0.5 mg by mouth.     • loratadine (CLARITIN) 10 mg tablet Take 10 mg by mouth daily.     • losartan (COZAAR) 50 mg tablet Take 1 tablet (50 mg total) by mouth daily. 90 tablet 3   • medroxyPROGESTERone (PROVERA) 10 mg tablet TAKE 1 TABLET BY MOUTH EVERY DAY FOR 7 DAYS     • miSOPROStoL (CYTOTEC) 100 mcg tablet INSERT HALF TABLET VAGINALLY NIGHT BEFORE THE PROCEDURE     • montelukast (SINGULAIR) 10 mg tablet Take 1 tablet (10 mg total) by mouth once daily. 90 tablet 3   • norgestimate-ethinyl estradiol (NICK ORAL) Take by mouth.     • rosuvastatin (CRESTOR) 5 mg tablet Take 1 tablet (5 mg total) by mouth daily. 90 tablet 3   • spironolactone (ALDACTONE) 100 mg tablet Take 0.5 tablets (50 mg total) by mouth daily. 90 tablet 3   • VASCULERA 630 mg tablet Take one tablet by mouth daily 90 tablet 0     No current facility-administered medications on file prior to visit.          Neomycin, Neomycin-bacitracnzn-polymyxnb, Penicillins, Latex, and Miconazole         PHYSICAL EXAMINATION      Visit Vitals  /80 (BP Location: Left upper arm, Patient Position: Sitting)   Pulse 80   Resp 18   Ht 1.575 m (5' 2\")   Wt 76.5 kg (168 lb 11.2 oz)   SpO2 98%   BMI 30.86 kg/m²      Body mass index is 30.86 kg/m².    Physical Exam           ASSESSMENT AND PLAN         Obesity  Excellent improvement in " "activity, fasting, and water intake.     4 pound weight reduction (maximum weight 260 pounds-any weight loss at this point is excellent)    Begin to monitor nutrition vs health benefits.  This will allow behavior change to happen more seamlessly and less forced.    Educated on metformin and anti-inflammatory effects, has some at home from her daughter, will initiate what she has at home, written prescription provided.    Metformin is not a \"weight loss\" medication, but helps to manage the most common risk factor for weight gain --- \"insulin resistance.\"  When we are able to decrease the amouunt of insulin in our body, we can reverse the \"store fat\" command that insulin delivers to our liver.     Start with 1 tab daily for at least 2 weeks before the evening meal.  After 2 weeks, increase to 2 tabs daily with the evening meal if you are tolerating it well. If you tolerate 2 tabs once daily, please increase to 3 tabs once daily.  1500 mg is the dose we would like to achieve, but if your body tolerates a lower dose, and gets stomach upset with a higher dose for more than 1 week, please return to the lower dose.    Side Effects: Most common side effects are upset stomach or loose bowels (this happens to approximately 10% of patients).  If this side effect occurs, it will generally get better in 1 to 2 weeks, so if it is mild and tolerable, please continue the medication.  If you develop severe stomach upset or diarrhea, it is okay to discontinue the medication.    Metformin comes in 2 formulations, immediate release and extended release.  Most people tolerate extended release better, but this is not always the case.    If you have any side effects to the extended release, we can initiate immediate release to see if that is better tolerated.     In the event of a \"GI\" bug or if you need contrast dye for any reason, you will need to stop metformin for 3-4 days.     I spent 30 minutes on this date of service performing the " following activities: obtaining history, entering orders, documenting, preparing for visit, obtaining / reviewing records and providing counseling and education.        Lipedema  Education on the lymphatic system and how it plays with weight regulation, specifically inflammation.    Continue with intermittent fasting.    Continue vasculera and add metformin.      Continue with home exercise program -- yoga or pilates.      Consider adding in dry brushing.     Provided information on GLP-1 agents.     Excellent candidate for GLP-1 as there are no pregnancy plans, no history of pancreatitis, and no personal or family history of medullary thyroid tumors.  Educated on long-term nature of this medication, we do see weight regain with discontinuation of medication.  Successful weight loss with this medication begins at 5% body weight loss, but we can expect up to 20% body weight loss with this intervention.  These medications must be used in combination with proper nutrition, regular physical activity, and proper sleep and stress management.      Discussed most common side effects of nausea and constipation.  Generally nausea can be helped by having small frequent meals.  Additionally, krysta tea can be helpful in settling the stomach.             Thank you very much for allowing us to participate in the care of your patient. Please do not hesitate to call or email if there are any questions.

## 2022-12-08 NOTE — PATIENT INSTRUCTIONS
Problem List Items Addressed This Visit          Endocrine/Metabolic    Obesity - Primary     1. Continue with current nutrition plan   2. Focus on 12-8pm eating window or 12-10pm on weekends          Weight loss       Dermatologic    Lipedema       Other    Hyperlipidemia    Dietary counseling

## 2022-12-08 NOTE — PROGRESS NOTES
"DETAILS OF VISIT     Consulting Service:  [facility] Comprehensive [facility] and Wellness Program - Dietitian    Referring Physician: No ref. provider found    PCP: Caren Guardado MD    Reason for Consultation:   Chief Complaint   Patient presents with   • Nutrition Counseling       I met with Aurora Cedillo today for Nutrition Counseling [Z71.3] and dietary education related to the following:    E66.9 - Obesity, unspecified - obesity NOS and Z68.31 - BMI 31.0-31.9, adult       VISIT DESCRIPTION         Aurora Cedillo is a 52 y.o. female here for nutrition follow-up.    Current Weight:   Wt Readings from Last 1 Encounters:   12/01/22 76.5 kg (168 lb 11.2 oz)     Height:   Ht Readings from Last 1 Encounters:   12/01/22 1.575 m (5' 2\")        BMI: There is no height or weight on file to calculate BMI.    Weight Trends:    Wt Readings from Last 3 Encounters:   12/01/22 76.5 kg (168 lb 11.2 oz)   11/02/22 77.3 kg (170 lb 8 oz)   11/01/22 77.1 kg (170 lb)       BMI Readings from Last 3 Encounters:   12/01/22 30.86 kg/m²   11/02/22 31.18 kg/m²   11/01/22 31.09 kg/m²       Patient seen by this RD for evaluation in the Comprehensive Weight & Wellness Program. During today's visit, we discussed the following:    Nutrition, Physical Activity, & Emotional Wellness:   Recommended Nutrition Plan  1350 HLD diet + Wellbutrin + Metformin   Meal Structure  5 or less eating events recommended   Eating Interval  Less than 12 hours recommended   Intake Logging  Healthy Lifestyles paper food journal or electronic (Baritastic, MFP, LoseIt, Carb Manager, etc)    Successes   Food type-encouraged dairy and gluten free continued with food logging on paper     Food timing -eating between 12pm-8pm, black tea with stevia is not breaking fast-adding VCP to tea   Goal: 12-10pm on eating windows 2/2 etoh consumption     Food logged for 4 days   appropriate food pairing   -prioritizing protein     Medication -taking Wellbutrin as prescribed, started " taking metformin as prescribed    Challenges Emotional wellness -high stress, father is sick and out of state. Little time dedicated to herself. Discussed impact cortisol has on weight and wellness.     Dedicated exercise -likes yoga, little motivation to start exercising routinely. Encouraged one day per week dedicated to yoga.      Cooking -does not like to cook, recommended monthly meal plain with recipes sent to assist with decreasing anxiety associated with cooking        Increased alcohol intake around the holidays disrupting food timing       Allergies:   Latex (apple, avocado, banana, carrot, celery, chestnut, kiwi, melons, papaya, raw potato, tomato)     Education Provided Navigating obstacles listed above and outlined in goals below     The following hand-outs and/or educational materials were given: Recipes and monthly meal calender     A patient-centered approach using motivational interviewing was used to develop the plan and recommendations outlined below.     Time Spent with Patient for face to face office visit: 30 minutes      MEDICATIONS, LABS, AND ALLERGIES     Current Outpatient Medications on File Prior to Visit   Medication Sig Dispense Refill   • azelastine (ASTELIN) 137 mcg (0.1 %) nasal spray Administer 1 spray into each nostril 2 (two) times a day. Use in each nostril as directed. 180 mL 3   • buPROPion XL (WELLBUTRIN XL) 150 mg 24 hr tablet Take 1 tablet (150 mg total) by mouth daily. With 300mg tabs (total 450mg) 90 tablet 3   • buPROPion XL (WELLBUTRIN XL) 300 mg 24 hr tablet Take 1 tablet (300 mg total) by mouth daily. With 150mg tabs (total 450mg) 90 tablet 3   • chromium picolinate 200 mcg tablet Take by mouth.     • crisaborole (EUCRISA) 2 % ointment Eucrisa 2 % topical ointment     • famotidine (PEPCID) 40 mg tablet Take 1 tablet (40 mg total) by mouth 2 (two) times a day. 180 tablet 1   • fluticasone propionate (FLONASE) 50 mcg/actuation nasal spray Administer 1 spray into each  nostril 2 (two) times a day. With Astelin 16 g 3   • hydrochlorothiazide (HYDRODIURIL) 25 mg tablet Take 0.5 tablets (12.5 mg total) by mouth daily. 90 tablet 3   • ipratropium (ATROVENT) 42 mcg (0.06 %) nasal spray Administer 1-2 sprays into each nostril 3 (three) times a day. 45 mL 1   • L. acidophilus/pectin, citrus (ACIDOPHILUS PROBIOTIC ORAL) Take 0.5 mg by mouth.     • loratadine (CLARITIN) 10 mg tablet Take 10 mg by mouth daily.     • losartan (COZAAR) 50 mg tablet Take 1 tablet (50 mg total) by mouth daily. 90 tablet 3   • medroxyPROGESTERone (PROVERA) 10 mg tablet TAKE 1 TABLET BY MOUTH EVERY DAY FOR 7 DAYS     • metFORMIN XR (GLUCOPHAGE-XR) 500 mg 24 hr tablet Take 3 tablets (1,500 mg total) by mouth daily before dinner. Week 1 and 2 start with tab daily then week 3 and 4 take 2 tabs daily and then increase to 3 tabs daily 90 tablet 0   • miSOPROStoL (CYTOTEC) 100 mcg tablet INSERT HALF TABLET VAGINALLY NIGHT BEFORE THE PROCEDURE     • montelukast (SINGULAIR) 10 mg tablet Take 1 tablet (10 mg total) by mouth once daily. 90 tablet 3   • norgestimate-ethinyl estradiol (NICK ORAL) Take by mouth.     • rosuvastatin (CRESTOR) 5 mg tablet Take 1 tablet (5 mg total) by mouth daily. 90 tablet 3   • semaglutide (OZEMPIC) 0.25 mg or 0.5 mg(2 mg/1.5 mL) pen injector Inject 0.5 mg under the skin every (seven) 7 days. Weeks 1-4 start with 0.25 mg subcutaneous weekly then increase to 0.5 mg weekly 1.5 mL 0   • spironolactone (ALDACTONE) 100 mg tablet Take 0.5 tablets (50 mg total) by mouth daily. 90 tablet 3   • VASCULERA 630 mg tablet Take one tablet by mouth daily 90 tablet 0     No current facility-administered medications on file prior to visit.         Neomycin, Neomycin-bacitracnzn-polymyxnb, Penicillins, Latex, and Miconazole       PATIENT-LED GOALS     Obesity  1. Continue with current nutrition plan   2. Focus on 12-8pm eating window or 12-10pm on weekends         Yasemin Pereyra RD, LDN  Registered Dietitian  for [facility] and Well[facility] Program  12/9/2022     Thank you very much for allowing us to participate in the care of your patient. Please do not hesitate to call or email if there are any questions.

## 2022-12-09 ENCOUNTER — CLINICAL SUPPORT (OUTPATIENT)
Dept: BARIATRICS/WEIGHT MGMT | Facility: CLINIC | Age: 52
End: 2022-12-09
Payer: COMMERCIAL

## 2022-12-09 DIAGNOSIS — R63.4 WEIGHT LOSS: ICD-10-CM

## 2022-12-09 DIAGNOSIS — E66.09 CLASS 1 OBESITY DUE TO EXCESS CALORIES WITH SERIOUS COMORBIDITY AND BODY MASS INDEX (BMI) OF 31.0 TO 31.9 IN ADULT: Primary | ICD-10-CM

## 2022-12-09 DIAGNOSIS — Z71.3 DIETARY COUNSELING: ICD-10-CM

## 2022-12-09 DIAGNOSIS — E66.811 CLASS 1 OBESITY DUE TO EXCESS CALORIES WITH SERIOUS COMORBIDITY AND BODY MASS INDEX (BMI) OF 31.0 TO 31.9 IN ADULT: Primary | ICD-10-CM

## 2022-12-09 DIAGNOSIS — E78.49 OTHER HYPERLIPIDEMIA: ICD-10-CM

## 2022-12-09 DIAGNOSIS — R60.9 LIPEDEMA: ICD-10-CM

## 2022-12-09 PROCEDURE — 97803 MED NUTRITION INDIV SUBSEQ: CPT | Performed by: DIETITIAN, REGISTERED

## 2022-12-09 NOTE — ASSESSMENT & PLAN NOTE
1. Continue with current nutrition plan   2. Focus on 12-8pm eating window or 12-10pm on weekends

## 2022-12-27 ENCOUNTER — APPOINTMENT (RX ONLY)
Dept: URBAN - METROPOLITAN AREA CLINIC 23 | Facility: CLINIC | Age: 52
Setting detail: DERMATOLOGY
End: 2022-12-27

## 2022-12-27 DIAGNOSIS — L82.0 INFLAMED SEBORRHEIC KERATOSIS: ICD-10-CM

## 2022-12-27 PROCEDURE — ? COUNSELING

## 2022-12-27 PROCEDURE — ? LIQUID NITROGEN

## 2022-12-27 PROCEDURE — 17110 DESTRUCTION B9 LES UP TO 14: CPT

## 2022-12-27 ASSESSMENT — LOCATION SIMPLE DESCRIPTION DERM: LOCATION SIMPLE: ABDOMEN

## 2022-12-27 ASSESSMENT — LOCATION ZONE DERM: LOCATION ZONE: TRUNK

## 2022-12-27 ASSESSMENT — LOCATION DETAILED DESCRIPTION DERM: LOCATION DETAILED: PERIUMBILICAL SKIN

## 2022-12-27 NOTE — PROCEDURE: LIQUID NITROGEN
Number Of Freeze-Thaw Cycles: 3 freeze-thaw cycles
Show Aperture Variable?: Yes
Render Note In Bullet Format When Appropriate: No
Medical Necessity Information: It is in your best interest to select a reason for this procedure from the list below. All of these items fulfill various CMS LCD requirements except the new and changing color options.
Medical Necessity Clause: This procedure was medically necessary because the lesions that were treated were:
Detail Level: Simple
Post-Care Instructions: I reviewed with the patient in detail post-care instructions. Patient is to wear sunprotection, and avoid picking at any of the treated lesions. Pt may apply Vaseline to crusted or scabbing areas.
Duration Of Freeze Thaw-Cycle (Seconds): 5
Spray Paint Text: The liquid nitrogen was applied to the skin utilizing a spray paint frosting technique.
Consent: The patient's consent was obtained including but not limited to risks of crusting, scabbing, blistering, scarring, darker or lighter pigmentary change, recurrence, incomplete removal and infection.

## 2023-01-05 ENCOUNTER — OFFICE VISIT (OUTPATIENT)
Dept: INTERNAL MEDICINE | Facility: CLINIC | Age: 53
End: 2023-01-05
Payer: COMMERCIAL

## 2023-01-05 VITALS
DIASTOLIC BLOOD PRESSURE: 68 MMHG | OXYGEN SATURATION: 98 % | WEIGHT: 163 LBS | SYSTOLIC BLOOD PRESSURE: 118 MMHG | RESPIRATION RATE: 16 BRPM | BODY MASS INDEX: 30 KG/M2 | HEIGHT: 62 IN

## 2023-01-05 DIAGNOSIS — L70.8 OTHER ACNE: ICD-10-CM

## 2023-01-05 DIAGNOSIS — E78.49 OTHER HYPERLIPIDEMIA: ICD-10-CM

## 2023-01-05 DIAGNOSIS — Z00.00 ENCOUNTER FOR ANNUAL PHYSICAL EXAM: Primary | ICD-10-CM

## 2023-01-05 DIAGNOSIS — F32.89 OTHER DEPRESSION: ICD-10-CM

## 2023-01-05 DIAGNOSIS — I10 ESSENTIAL HYPERTENSION, BENIGN: ICD-10-CM

## 2023-01-05 PROCEDURE — 3008F BODY MASS INDEX DOCD: CPT | Performed by: INTERNAL MEDICINE

## 2023-01-05 PROCEDURE — 99396 PREV VISIT EST AGE 40-64: CPT | Performed by: INTERNAL MEDICINE

## 2023-01-05 RX ORDER — LOSARTAN POTASSIUM 50 MG/1
50 TABLET ORAL DAILY
Qty: 90 TABLET | Refills: 3 | Status: SHIPPED | OUTPATIENT
Start: 2023-01-05 | End: 2024-01-12

## 2023-01-05 ASSESSMENT — ENCOUNTER SYMPTOMS
FATIGUE: 0
SINUS PRESSURE: 0
WEAKNESS: 0
SEIZURES: 0
DIFFICULTY URINATING: 0
UNEXPECTED WEIGHT CHANGE: 0
NUMBNESS: 0
SHORTNESS OF BREATH: 0
HEADACHES: 0
ARTHRALGIAS: 0
FREQUENCY: 0
NERVOUS/ANXIOUS: 0
DIZZINESS: 0
SORE THROAT: 0
NECK PAIN: 0
CONSTIPATION: 0
DYSPHORIC MOOD: 0
JOINT SWELLING: 0
CHILLS: 0
DIARRHEA: 0
PALPITATIONS: 0
BACK PAIN: 0
ABDOMINAL PAIN: 0
DYSURIA: 0
BRUISES/BLEEDS EASILY: 0
COUGH: 0
SLEEP DISTURBANCE: 0
FEVER: 0

## 2023-01-05 NOTE — ASSESSMENT & PLAN NOTE
Stable on spironolactone, prescribed by Derm   [Verbal] : Verbal [Written] : Written [Demo] : Demo [Patient] : Patient [Fair - mild discomfort, physical impairment, low acceptance] : Fair - mild discomfort, physical impairment, low acceptance [Verbalizes knowledge/Understanding] : Verbalizes knowledge/understanding [Dressing changes] : dressing changes [Foot Care] : foot care [Skin Care] : skin care [Signs and symptoms of infection] : sign and symptoms of infection [Nutrition] : nutrition [How and When to Call] : how and when to call [Pain Management] : pain management [Off-loading] : off-loading [Patient responsibility to plan of care] : patient responsibility to plan of care [Glycemic Control] : glycemic control [Stable] : stable [Home] : Home [Ambulatory] : Ambulatory [Not Applicable - Long Term Care/Home Health Agency] : Long Term Care/Home Health Agency: Not Applicable [] : No [FreeTextEntry2] : Infection prevention\par Localized wound care \par Goal of remaining pain free regarding wounds.\par Promote optimal nutrition  [FreeTextEntry4] : Follow up in 1 week

## 2023-01-05 NOTE — PROGRESS NOTES
Subjective      Patient ID: Aurora Cedillo is a 52 y.o. female.    HPI     Patient presents for a physical.  Reviewed lab results from 2 months ago.  All normal.  Has been seeing Dr. Macdonald at the [facility] who started her on metformin bid. Also prescribed her Ozempic but hasn't filled it.  Hoping she won't need to start.  Feels improvement on the metformin already.  Losing weight.  Had a bad URI for a couple weeks but feels better the last couple days. Stopped the claritin for possible constipation.  Still using the nasal sprays.  Took claritin for skin issues also, but they are improved.  UTD with skin check.  Taking spironolactone for acne, now on 100 mg.  Sees GYN, Dr. Rand.  Recently had DUB and had an endometrial biopsy which was negative.  Was started on OCPs, Lindsey, which has been working.  Has follow-up and annual GYN appointment later this month.  Up-to-date with mammogram in November at [facility].  Up-to-date with colonoscopy in 11/2020.  Due again in 5 years.  Did PT for neck and low back pain and symptoms improved significantly, including the right head pain.  Under stress related to family.  Her father's health has been declining and needs to move to an assisted living facility.  Doing well on Wellbutrin  mg daily.  Up-to-date with vaccines.  Up-to-date with eye exam.  No other new/acute concerns.      The following have been reviewed and updated as appropriate in this visit:     Allergies  Meds  Problems         Past Medical History:   Diagnosis Date   • Allergic rhinitis    • Anxiety 5 years ago   • Arthritis    • Back pain    • BMI 40.0-44.9, adult (CMS/HCC)    • Cellulitis    • COVID-19 virus infection 9/19/2022   • Depression 06/28/2012   • Dysfunction of both eustachian tubes    • Essential hypertension, benign 06/28/2012   • GERD (gastroesophageal reflux disease) After lap band   • Gout 15-20 years ago    No longer have   • High cholesterol 20 years ago   • Hyperlipidemia     • Irregular menses    • Plantar fasciitis 20 years ago   • Right bundle branch block 2012   • Sensorineural hearing loss, bilateral    • Shingles     on head, age 20's   • Sinusitis    • Skin abnormalities All my life    Several skin issues some related to allergies, stress   • Skin rash    • Sleep apnea    • Snoring    • UTI (urinary tract infection)    • Varicose veins of lower extremity    • Vitamin D deficiency    • Yeast infection      Past Surgical History:   Procedure Laterality Date   • CARPAL TUNNEL RELEASE  25 years ago    Corrected with surgery   • COLONOSCOPY     • ENDOMETRIAL BIOPSY     • KNEE ARTHROSCOPY  15 years ago   • KNEE SURGERY  7 years ago   • LAPAROSCOPIC GASTRIC BANDING  15 years ago?    Unsure of exact timeframe   • PARTIAL KNEE ARTHROPLASTY Left    • SINUS SURGERY     • VAGINAL DELIVERY       Family History   Problem Relation Age of Onset   • Atrial fibrillation Biological Mother    • Anxiety disorder Biological Mother    • Atrial fibrillation Biological Father    • Stroke Biological Father         with cognitive impairment   • Heart disease Biological Father    • Hypertension Biological Father    • Mental illness Biological Father    • Obesity Biological Father    • Atrial fibrillation Biological Brother    • Stroke Biological Brother          of CVA age 52   • ADD / ADHD Biological Daughter    • Anxiety disorder Biological Daughter    • No Known Problems Biological Son    • Autism spectrum disorder Biological Son         high functioning   • ADD / ADHD Biological Son         oldest, declines meds     Social History     Socioeconomic History   • Marital status:      Spouse name: None   • Number of children: 3   • Years of education: None   • Highest education level: None   Occupational History   • Occupation: [employer]   Tobacco Use   • Smoking status: Former     Packs/day: 0.50     Years: 5.00     Pack years: 2.50     Types: Cigarettes     Start date: 1988     Quit  date: 1993     Years since quittin.0   • Smokeless tobacco: Never   Vaping Use   • Vaping Use: Never used   Substance and Sexual Activity   • Alcohol use: Yes     Comment: once in a while   • Drug use: Not Currently   • Sexual activity: Yes     Partners: Male     Birth control/protection: Abstinence, Coitus interruptus/Pulling Out, Condom Male     Comment: Unable to take pill due to side effects.   Social History Narrative    Children- 2 sons (16, 10), 1 daughter (13). 2021    Caffeine - coffee 1 daily. Iced tea, unsweetened    Exercise- yoga     Diet- intermittent fasting    Pets- dog    Hinduism affiliation-     Lives with spouse and three children       Review of Systems   Constitutional: Negative for chills, fatigue, fever and unexpected weight change.   HENT: Negative for congestion, hearing loss, sinus pressure and sore throat.    Eyes: Negative for visual disturbance.   Respiratory: Negative for cough and shortness of breath.    Cardiovascular: Negative for chest pain and palpitations.   Gastrointestinal: Negative for abdominal pain, constipation and diarrhea.   Endocrine: Negative for cold intolerance and heat intolerance.   Genitourinary: Negative for difficulty urinating, dysuria, frequency, pelvic pain, vaginal bleeding and vaginal discharge.   Musculoskeletal: Negative for arthralgias, back pain, joint swelling and neck pain.   Skin: Negative for rash.   Allergic/Immunologic: Negative for environmental allergies and food allergies.   Neurological: Negative for dizziness, seizures, weakness, numbness and headaches.   Hematological: Does not bruise/bleed easily.   Psychiatric/Behavioral: Negative for dysphoric mood and sleep disturbance. The patient is not nervous/anxious.        Allergies   Allergen Reactions   • Neomycin Hives   • Neomycin-Bacitracnzn-Polymyxnb Itching   • Penicillins    • Latex Rash   • Miconazole Rash     Current Outpatient Medications   Medication Sig Dispense Refill   •  azelastine (ASTELIN) 137 mcg (0.1 %) nasal spray Administer 1 spray into each nostril 2 (two) times a day. Use in each nostril as directed. 180 mL 3   • buPROPion XL (WELLBUTRIN XL) 150 mg 24 hr tablet Take 1 tablet (150 mg total) by mouth daily. With 300mg tabs (total 450mg) 90 tablet 3   • buPROPion XL (WELLBUTRIN XL) 300 mg 24 hr tablet Take 1 tablet (300 mg total) by mouth daily. With 150mg tabs (total 450mg) 90 tablet 3   • crisaborole (EUCRISA) 2 % ointment Eucrisa 2 % topical ointment     • fluticasone propionate (FLONASE) 50 mcg/actuation nasal spray Administer 1 spray into each nostril 2 (two) times a day. With Astelin 16 g 3   • hydrochlorothiazide (HYDRODIURIL) 25 mg tablet Take 0.5 tablets (12.5 mg total) by mouth daily. 90 tablet 3   • ipratropium (ATROVENT) 42 mcg (0.06 %) nasal spray Administer 1-2 sprays into each nostril 3 (three) times a day. 45 mL 1   • L. acidophilus/pectin, citrus (ACIDOPHILUS PROBIOTIC ORAL) Take 0.5 mg by mouth.     • losartan (COZAAR) 50 mg tablet Take 1 tablet (50 mg total) by mouth daily. 90 tablet 3   • metFORMIN XR (GLUCOPHAGE-XR) 500 mg 24 hr tablet Take 3 tablets (1,500 mg total) by mouth daily before dinner. Week 1 and 2 start with tab daily then week 3 and 4 take 2 tabs daily and then increase to 3 tabs daily 90 tablet 0   • montelukast (SINGULAIR) 10 mg tablet Take 1 tablet (10 mg total) by mouth once daily. 90 tablet 3   • norgestimate-ethinyl estradiol (NICK ORAL) Take by mouth.     • rosuvastatin (CRESTOR) 5 mg tablet Take 1 tablet (5 mg total) by mouth daily. 90 tablet 3   • spironolactone (ALDACTONE) 100 mg tablet Take 100 mg by mouth daily.  90 tablet 3   • VASCULERA 630 mg tablet Take one tablet by mouth daily 90 tablet 0   • chromium picolinate 200 mcg tablet Take by mouth.     • famotidine (PEPCID) 40 mg tablet Take 1 tablet (40 mg total) by mouth 2 (two) times a day. (Patient not taking: Reported on 1/5/2023) 180 tablet 1   • loratadine (CLARITIN) 10 mg  "tablet Take 10 mg by mouth daily.       No current facility-administered medications for this visit.       Objective   Vitals:    01/05/23 0901   BP: 118/68   Resp: 16   SpO2: 98%   Weight: 73.9 kg (163 lb)   Height: 1.575 m (5' 2\")     Body mass index is 29.81 kg/m².    Physical Exam  Constitutional:       Appearance: She is well-developed.   HENT:      Head: Normocephalic and atraumatic.      Right Ear: Tympanic membrane, ear canal and external ear normal.      Left Ear: Tympanic membrane, ear canal and external ear normal.      Nose: Nose normal.      Mouth/Throat:      Mouth: Mucous membranes are moist.      Pharynx: Oropharynx is clear.   Eyes:      Conjunctiva/sclera: Conjunctivae normal.      Pupils: Pupils are equal, round, and reactive to light.   Neck:      Thyroid: No thyromegaly.   Cardiovascular:      Rate and Rhythm: Normal rate and regular rhythm.      Heart sounds: Normal heart sounds. No murmur heard.  Pulmonary:      Effort: Pulmonary effort is normal. No respiratory distress.      Breath sounds: Normal breath sounds. No wheezing or rales.   Abdominal:      General: Bowel sounds are normal. There is no distension.      Palpations: Abdomen is soft.      Tenderness: There is no abdominal tenderness. There is no guarding or rebound.   Musculoskeletal:         General: Normal range of motion.      Cervical back: Normal range of motion and neck supple.   Skin:     General: Skin is warm and dry.      Findings: No rash.   Neurological:      General: No focal deficit present.      Mental Status: She is alert and oriented to person, place, and time.      Cranial Nerves: No cranial nerve deficit.      Sensory: No sensory deficit.   Psychiatric:         Mood and Affect: Mood normal.         Behavior: Behavior normal.         Thought Content: Thought content normal.         Judgment: Judgment normal.         Assessment/Plan   Problem List Items Addressed This Visit        Circulatory    Essential hypertension, " benign     Well-controlled on losartan 50 mg and HCTZ 12.5 mg daily         Relevant Medications    losartan (COZAAR) 50 mg tablet       Dermatologic    Acne     Stable on spironolactone, prescribed by [facility]    Depression     Increased stress related to her father's declining health.  Stable on Wellbutrin  mg daily.            Other    Hyperlipidemia     At goal with rosuvastatin         Encounter for annual physical exam - Primary     Labs normal.  Up-to-date with all preventive screenings.  Planning to get the newest COVID booster in the next couple months.  Up-to-date with skin and eye exams.  Continue working with the [facility]            Caren Guardado MD    1/5/2023

## 2023-01-05 NOTE — ASSESSMENT & PLAN NOTE
Labs normal.  Up-to-date with all preventive screenings.  Planning to get the newest COVID booster in the next couple months.  Up-to-date with skin and eye exams.  Continue working with the [facility]

## 2023-01-06 ENCOUNTER — APPOINTMENT (RX ONLY)
Dept: URBAN - METROPOLITAN AREA CLINIC 23 | Facility: CLINIC | Age: 53
Setting detail: DERMATOLOGY
End: 2023-01-06

## 2023-01-06 DIAGNOSIS — L82.0 INFLAMED SEBORRHEIC KERATOSIS: ICD-10-CM

## 2023-01-06 PROBLEM — D48.5 NEOPLASM OF UNCERTAIN BEHAVIOR OF SKIN: Status: ACTIVE | Noted: 2023-01-06

## 2023-01-06 PROCEDURE — ? COUNSELING

## 2023-01-06 PROCEDURE — ? LIQUID NITROGEN

## 2023-01-06 PROCEDURE — 11102 TANGNTL BX SKIN SINGLE LES: CPT | Mod: 59,79

## 2023-01-06 PROCEDURE — 17110 DESTRUCTION B9 LES UP TO 14: CPT | Mod: 79

## 2023-01-06 PROCEDURE — ? BIOPSY BY SHAVE METHOD

## 2023-01-06 ASSESSMENT — LOCATION DETAILED DESCRIPTION DERM
LOCATION DETAILED: RIGHT LATERAL ABDOMEN
LOCATION DETAILED: LEFT LATERAL ABDOMEN

## 2023-01-06 ASSESSMENT — LOCATION SIMPLE DESCRIPTION DERM: LOCATION SIMPLE: ABDOMEN

## 2023-01-06 ASSESSMENT — LOCATION ZONE DERM: LOCATION ZONE: TRUNK

## 2023-01-06 NOTE — PROCEDURE: BIOPSY BY SHAVE METHOD
Detail Level: Detailed
Depth Of Biopsy: dermis
Was A Bandage Applied: Yes
Size Of Lesion In Cm: 0
Biopsy Type: H and E
Biopsy Method: Personna blade
Anesthesia Type: 1% Xylocaine without epinephrine
Anesthesia Volume In Cc (Will Not Render If 0): 0.5
Hemostasis: Drysol
Wound Care: Petrolatum
Dressing: Band-Aid
Destruction After The Procedure: No
Type Of Destruction Used: Curettage
Curettage Text: The wound bed was treated with curettage after the biopsy was performed.
Cryotherapy Text: The wound bed was treated with cryotherapy after the biopsy was performed.
Electrodesiccation Text: The wound bed was treated with electrodesiccation after the biopsy was performed.
Electrodesiccation And Curettage Text: The wound bed was treated with electrodesiccation and curettage after the biopsy was performed.
Silver Nitrate Text: The wound bed was treated with silver nitrate after the biopsy was performed.
Lab: -19
Consent: Written consent was obtained and risks were reviewed including but not limited to scarring, infection, bleeding, scabbing, incomplete removal, nerve damage and allergy to anesthesia.
Post-Care Instructions: I reviewed with the patient in detail post-care instructions. Patient is to wash biopsy site daily and apply Vaseline daily until healed.
Notification Instructions: Patient will be notified of biopsy results. However, patient instructed to call the office if not contacted within 2 weeks.
Billing Type: Third-Party Bill
Information: Selecting Yes will display possible errors in your note based on the variables you have selected. This validation is only offered as a suggestion for you. PLEASE NOTE THAT THE VALIDATION TEXT WILL BE REMOVED WHEN YOU FINALIZE YOUR NOTE. IF YOU WANT TO FAX A PRELIMINARY NOTE YOU WILL NEED TO TOGGLE THIS TO 'NO' IF YOU DO NOT WANT IT IN YOUR FAXED NOTE.

## 2023-01-06 NOTE — PROCEDURE: LIQUID NITROGEN
Add 52 Modifier (Optional): no
Spray Paint Text: The liquid nitrogen was applied to the skin utilizing a spray paint frosting technique.
Medical Necessity Clause: This procedure was medically necessary because the lesions that were treated were:
Show Aperture Variable?: Yes
Duration Of Freeze Thaw-Cycle (Seconds): 5
Number Of Freeze-Thaw Cycles: 3 freeze-thaw cycles
Medical Necessity Information: It is in your best interest to select a reason for this procedure from the list below. All of these items fulfill various CMS LCD requirements except the new and changing color options.
Detail Level: Simple
Post-Care Instructions: I reviewed with the patient in detail post-care instructions. Patient is to wear sunprotection, and avoid picking at any of the treated lesions. Pt may apply Vaseline to crusted or scabbing areas.
Consent: The patient's consent was obtained including but not limited to risks of crusting, scabbing, blistering, scarring, darker or lighter pigmentary change, recurrence, incomplete removal and infection.

## 2023-02-20 ENCOUNTER — APPOINTMENT (RX ONLY)
Dept: URBAN - METROPOLITAN AREA CLINIC 23 | Facility: CLINIC | Age: 53
Setting detail: DERMATOLOGY
End: 2023-02-20

## 2023-02-20 DIAGNOSIS — L50.1 IDIOPATHIC URTICARIA: ICD-10-CM

## 2023-02-20 PROCEDURE — ? PRESCRIPTION MEDICATION MANAGEMENT

## 2023-02-20 PROCEDURE — 99213 OFFICE O/P EST LOW 20 MIN: CPT

## 2023-02-20 PROCEDURE — ? COUNSELING

## 2023-02-20 PROCEDURE — ? PRESCRIPTION

## 2023-02-20 RX ORDER — LORATADINE 10 MG/1
TABLET ORAL
Qty: 60 | Refills: 2 | Status: ERX | COMMUNITY
Start: 2023-02-20

## 2023-02-20 RX ADMIN — LORATADINE 1: 10 TABLET ORAL at 00:00

## 2023-02-20 ASSESSMENT — LOCATION SIMPLE DESCRIPTION DERM
LOCATION SIMPLE: LEFT SHOULDER
LOCATION SIMPLE: LEFT HAND
LOCATION SIMPLE: RIGHT HAND
LOCATION SIMPLE: LEFT FOOT
LOCATION SIMPLE: RIGHT FOOT
LOCATION SIMPLE: LEFT LOWER BACK
LOCATION SIMPLE: LEFT SCALP
LOCATION SIMPLE: RIGHT SHOULDER

## 2023-02-20 ASSESSMENT — LOCATION DETAILED DESCRIPTION DERM
LOCATION DETAILED: LEFT INFERIOR LATERAL MIDBACK
LOCATION DETAILED: LEFT POSTERIOR SHOULDER
LOCATION DETAILED: RIGHT POSTERIOR SHOULDER
LOCATION DETAILED: RIGHT DORSAL FOOT
LOCATION DETAILED: LEFT RADIAL PALM
LOCATION DETAILED: LEFT DORSAL FOOT
LOCATION DETAILED: LEFT MEDIAL FRONTAL SCALP
LOCATION DETAILED: RIGHT THENAR EMINENCE

## 2023-02-20 ASSESSMENT — LOCATION ZONE DERM
LOCATION ZONE: HAND
LOCATION ZONE: SCALP
LOCATION ZONE: ARM
LOCATION ZONE: FEET
LOCATION ZONE: TRUNK

## 2023-02-20 NOTE — PROCEDURE: PRESCRIPTION MEDICATION MANAGEMENT
Detail Level: Zone
Plan: RTC in 5 weeks
Render In Strict Bullet Format?: No
Initiate Treatment: Claritin 10 mg tablet: Take 1 tablet in the morning and 1 tablet at nighttime. Can take up to 4 tablets a day if still having hives.

## 2023-02-20 NOTE — PROCEDURE: COUNSELING
Patient Specific Counseling (Will Not Stick From Patient To Patient): \\nNo new meds, recent illness or URI
Detail Level: Zone

## 2023-03-06 ENCOUNTER — OFFICE VISIT (OUTPATIENT)
Dept: BARIATRICS/WEIGHT MGMT | Facility: CLINIC | Age: 53
End: 2023-03-06
Payer: COMMERCIAL

## 2023-03-06 VITALS
BODY MASS INDEX: 29.85 KG/M2 | DIASTOLIC BLOOD PRESSURE: 62 MMHG | WEIGHT: 163.2 LBS | RESPIRATION RATE: 18 BRPM | SYSTOLIC BLOOD PRESSURE: 116 MMHG | HEART RATE: 88 BPM | OXYGEN SATURATION: 99 %

## 2023-03-06 DIAGNOSIS — R60.9 LIPEDEMA: Primary | ICD-10-CM

## 2023-03-06 DIAGNOSIS — E66.3 OVERWEIGHT: ICD-10-CM

## 2023-03-06 PROCEDURE — 99213 OFFICE O/P EST LOW 20 MIN: CPT | Performed by: FAMILY MEDICINE

## 2023-03-06 PROCEDURE — 3008F BODY MASS INDEX DOCD: CPT | Performed by: FAMILY MEDICINE

## 2023-03-06 RX ORDER — METFORMIN HYDROCHLORIDE 750 MG/1
1500 TABLET, EXTENDED RELEASE ORAL
Qty: 180 TABLET | Refills: 1 | Status: SHIPPED | OUTPATIENT
Start: 2023-03-06 | End: 2023-07-11 | Stop reason: SDUPTHER

## 2023-03-06 NOTE — ASSESSMENT & PLAN NOTE
Continue treatment with lifestyle modifications.  See plan as outlined in Obesity treatment section.

## 2023-03-06 NOTE — ASSESSMENT & PLAN NOTE
Progress: Weight change:9 pound loss        NSV:Improved inflammation    Currently meeting the following goals:12 hour fast daily, Drinking 64 oz or more of water daily, Eating recommended amount of protein , Meeting vegetable fiber intake recommendations, Keeping to nutrition structure/No spontaneous eating, Hourly movement    Nutrition Goal: Continue with the nutrition plan.      Activity Goal: Staying active. Start Yoga.      Wellness Goal:  Restart yoga.    Medication recommendations: Metformin 750 mg 2 tabs per day to increase to 1500 mg a day.  Stop Vasculera.

## 2023-03-06 NOTE — PROGRESS NOTES
HISTORY OF PRESENT ILLNESS        Aurora Cedillo is a 52 y.o. female following up on lifestyle management and weight loss as adjunctive treatment strategies for Varicose veins, Lipedema and History of Bariatric surgery.      Medication(s) used to support lifestyle modifications:Metformin  Taking medications as prescribed:no - not taking with food.   Side effects: none  Medication effect: decreasing inflamation  Supplements used to support lifestyle modification:Probiotic and carb blocker.     Interval history:   Chart reviewed since our last visit.    Reviewed last Registered Dietitian's notes    Patient concerns/questions: Weight loss is slow, but has come to terms with that.    Updates on weight related conditions: None    Nutrition:Keeping to a fast.        Current Outpatient Medications on File Prior to Visit   Medication Sig Dispense Refill   • buPROPion XL (WELLBUTRIN XL) 150 mg 24 hr tablet Take 1 tablet (150 mg total) by mouth daily. With 300mg tabs (total 450mg) 90 tablet 3   • buPROPion XL (WELLBUTRIN XL) 300 mg 24 hr tablet Take 1 tablet (300 mg total) by mouth daily. With 150mg tabs (total 450mg) 90 tablet 3   • chromium picolinate 200 mcg tablet Take by mouth.     • crisaborole (EUCRISA) 2 % ointment Eucrisa 2 % topical ointment     • hydrochlorothiazide (HYDRODIURIL) 25 mg tablet Take 0.5 tablets (12.5 mg total) by mouth daily. 90 tablet 3   • L. acidophilus/pectin, citrus (ACIDOPHILUS PROBIOTIC ORAL) Take 0.5 mg by mouth.     • loratadine (CLARITIN) 10 mg tablet Take 10 mg by mouth daily.     • losartan (COZAAR) 50 mg tablet Take 1 tablet (50 mg total) by mouth daily. 90 tablet 3   • norgestimate-ethinyl estradiol (NICK ORAL) Take by mouth.     • rosuvastatin (CRESTOR) 5 mg tablet Take 1 tablet (5 mg total) by mouth daily. 90 tablet 3   • spironolactone (ALDACTONE) 100 mg tablet Take 100 mg by mouth daily.  90 tablet 3   • famotidine (PEPCID) 40 mg tablet Take 1 tablet (40 mg total) by mouth 2  (two) times a day. (Patient not taking: Reported on 1/5/2023) 180 tablet 1     No current facility-administered medications on file prior to visit.          Neomycin, Neomycin-bacitracnzn-polymyxnb, Penicillins, Latex, and Miconazole         PHYSICAL EXAMINATION      Visit Vitals  /62 (BP Location: Left upper arm, Patient Position: Sitting)   Pulse 88   Resp 18   Wt 74 kg (163 lb 3.2 oz)   SpO2 99%   BMI 29.85 kg/m²      Body mass index is 29.85 kg/m².    Physical Exam           ASSESSMENT AND PLAN         Overweight  Progress: Weight change:9 pound loss        NSV:Improved inflammation    Currently meeting the following goals:12 hour fast daily, Drinking 64 oz or more of water daily, Eating recommended amount of protein , Meeting vegetable fiber intake recommendations, Keeping to nutrition structure/No spontaneous eating, Hourly movement    Nutrition Goal: Continue with the nutrition plan.      Activity Goal: Staying active. Start Yoga.      Wellness Goal:  Restart yoga.    Medication recommendations: Metformin 750 mg 2 tabs per day to increase to 1500 mg a day.  Stop Vasculera.      Lipedema  Continue treatment with lifestyle modifications.  See plan as outlined in Obesity treatment section.             Thank you very much for allowing us to participate in the care of your patient. Please do not hesitate to call or email if there are any questions.

## 2023-03-30 ENCOUNTER — TELEPHONE (OUTPATIENT)
Dept: INTERNAL MEDICINE | Facility: CLINIC | Age: 53
End: 2023-03-30
Payer: COMMERCIAL

## 2023-03-30 ENCOUNTER — TELEPHONE (OUTPATIENT)
Dept: INTERNAL MEDICINE | Facility: CLINIC | Age: 53
End: 2023-03-30

## 2023-03-30 DIAGNOSIS — J30.9 ALLERGIC RHINITIS, UNSPECIFIED SEASONALITY, UNSPECIFIED TRIGGER: ICD-10-CM

## 2023-03-30 DIAGNOSIS — I10 ESSENTIAL HYPERTENSION, BENIGN: ICD-10-CM

## 2023-03-30 RX ORDER — SPIRONOLACTONE 100 MG/1
TABLET, FILM COATED ORAL
Qty: 90 TABLET | Refills: 3 | Status: SHIPPED | OUTPATIENT
Start: 2023-03-30 | End: 2024-03-06

## 2023-03-30 RX ORDER — SPIRONOLACTONE 100 MG/1
100 TABLET, FILM COATED ORAL DAILY
Qty: 90 TABLET | Refills: 3 | Status: CANCELLED | OUTPATIENT
Start: 2023-03-30

## 2023-03-30 RX ORDER — HYDROCHLOROTHIAZIDE 25 MG/1
12.5 TABLET ORAL DAILY
Qty: 90 TABLET | Refills: 3 | Status: CANCELLED | OUTPATIENT
Start: 2023-03-30

## 2023-03-30 RX ORDER — MONTELUKAST SODIUM 10 MG/1
TABLET ORAL
Qty: 90 TABLET | Refills: 3 | OUTPATIENT
Start: 2023-03-30

## 2023-03-30 RX ORDER — HYDROCHLOROTHIAZIDE 25 MG/1
TABLET ORAL
Qty: 90 TABLET | Refills: 3 | Status: SHIPPED | OUTPATIENT
Start: 2023-03-30 | End: 2024-03-06

## 2023-03-30 RX ORDER — ROSUVASTATIN CALCIUM 5 MG/1
5 TABLET, COATED ORAL DAILY
Qty: 90 TABLET | Refills: 3 | Status: CANCELLED | OUTPATIENT
Start: 2023-03-30 | End: 2023-09-26

## 2023-03-30 RX ORDER — ROSUVASTATIN CALCIUM 5 MG/1
TABLET, COATED ORAL
Qty: 90 TABLET | Refills: 3 | Status: SHIPPED | OUTPATIENT
Start: 2023-03-30 | End: 2024-03-07 | Stop reason: SDUPTHER

## 2023-03-30 NOTE — TELEPHONE ENCOUNTER
From: Aurora May Senette  To: Office of Caren Guardado MD  Sent: 3/30/2023  8:33 AM EDT  Subject: Medication Renewal Request    Refills have been requested for the following medications:        Other - Monolukast    Preferred pharmacy: [facility] - [address]  Delivery method: Mail      Medication renewals requested in this message routed separately:        rosuvastatin (CRESTOR) 5 mg tablet [Caren Guardado]        hydrochlorothiazide (HYDRODIURIL) 25 mg tablet        spironolactone (ALDACTONE) 100 mg tablet          Medicine Refill Request    Last Office: 1/5/2023   Last Consult Visit: Visit date not found  Last Telemedicine Visit: 9/19/2022 Magda Church DO    Next Appointment: Visit date not found      Current Outpatient Medications:   •  buPROPion XL (WELLBUTRIN XL) 150 mg 24 hr tablet, Take 1 tablet (150 mg total) by mouth daily. With 300mg tabs (total 450mg), Disp: 90 tablet, Rfl: 3  •  buPROPion XL (WELLBUTRIN XL) 300 mg 24 hr tablet, Take 1 tablet (300 mg total) by mouth daily. With 150mg tabs (total 450mg), Disp: 90 tablet, Rfl: 3  •  chromium picolinate 200 mcg tablet, Take by mouth., Disp: , Rfl:   •  crisaborole (EUCRISA) 2 % ointment, Eucrisa 2 % topical ointment, Disp: , Rfl:   •  famotidine (PEPCID) 40 mg tablet, Take 1 tablet (40 mg total) by mouth 2 (two) times a day. (Patient not taking: Reported on 1/5/2023), Disp: 180 tablet, Rfl: 1  •  hydrochlorothiazide (HYDRODIURIL) 25 mg tablet, Take 0.5 tablets (12.5 mg total) by mouth daily., Disp: 90 tablet, Rfl: 3  •  L. acidophilus/pectin, citrus (ACIDOPHILUS PROBIOTIC ORAL), Take 0.5 mg by mouth., Disp: , Rfl:   •  loratadine (CLARITIN) 10 mg tablet, Take 10 mg by mouth daily., Disp: , Rfl:   •  losartan (COZAAR) 50 mg tablet, Take 1 tablet (50 mg total) by mouth daily., Disp: 90 tablet, Rfl: 3  •  metFORMIN XR (GLUCOPHAGE-XR) 750 mg 24 hr tablet, Take 2 tablets (1,500 mg total) by mouth daily  with dinner., Disp: 180 tablet, Rfl: 1  •  norgestimate-ethinyl estradiol (NICK ORAL), Take by mouth., Disp: , Rfl:   •  rosuvastatin (CRESTOR) 5 mg tablet, Take 1 tablet (5 mg total) by mouth daily., Disp: 90 tablet, Rfl: 3  •  spironolactone (ALDACTONE) 100 mg tablet, Take 100 mg by mouth daily. , Disp: 90 tablet, Rfl: 3      BP Readings from Last 3 Encounters:   03/06/23 116/62   01/05/23 118/68   12/01/22 122/80       Recent Lab results:  Lab Results   Component Value Date    CHOL 161 11/03/2022   ,   Lab Results   Component Value Date    HDL 57 11/03/2022   ,   Lab Results   Component Value Date    LDLCALC 85 11/03/2022   ,   Lab Results   Component Value Date    TRIG 102 11/03/2022        Lab Results   Component Value Date    GLUCOSE 83 11/03/2022   , No results found for: HGBA1C      Lab Results   Component Value Date    CREATININE 0.74 11/03/2022       Lab Results   Component Value Date    TSH 2.300 11/03/2022

## 2023-03-30 NOTE — TELEPHONE ENCOUNTER
Vianca Hodge 2 hours ago (11:02 AM)     MM  Pt called back stating she made a mistake and no longer needs Dr. Guardado to refill a prescription and does not need to come in for an appointment. Pt stated her other physician is the one to refill the medication.

## 2023-03-30 NOTE — TELEPHONE ENCOUNTER
Pt called back stating she made a mistake and no longer needs Dr. Guardado to refill a prescription and does not need to come in for an appointment. Pt stated her other physician is the one to refill the medication.

## 2023-06-06 DIAGNOSIS — R60.9 LIPEDEMA: Primary | ICD-10-CM

## 2023-06-06 RX ORDER — DIOSMIN COMPLEX NO.1 630 MG
1 TABLET ORAL DAILY
Qty: 90 TABLET | Refills: 0 | Status: SHIPPED | OUTPATIENT
Start: 2023-06-06 | End: 2023-09-18

## 2023-07-03 ENCOUNTER — OFFICE VISIT (OUTPATIENT)
Dept: INTERNAL MEDICINE | Facility: CLINIC | Age: 53
End: 2023-07-03
Payer: COMMERCIAL

## 2023-07-03 VITALS
BODY MASS INDEX: 29.7 KG/M2 | RESPIRATION RATE: 19 BRPM | HEART RATE: 84 BPM | HEIGHT: 62 IN | TEMPERATURE: 98 F | OXYGEN SATURATION: 99 % | DIASTOLIC BLOOD PRESSURE: 60 MMHG | WEIGHT: 161.4 LBS | SYSTOLIC BLOOD PRESSURE: 102 MMHG

## 2023-07-03 DIAGNOSIS — R35.0 URINARY FREQUENCY: Primary | ICD-10-CM

## 2023-07-03 LAB
BILIRUBIN, POC: NEGATIVE
BLOOD URINE, POC: ABNORMAL
CLARITY, POC: CLEAR
COLOR, POC: ABNORMAL
EXPIRATION DATE: ABNORMAL
GLUCOSE URINE, POC: NEGATIVE
KETONES, POC: NEGATIVE
LEUKOCYTE EST, POC: ABNORMAL
Lab: ABNORMAL
NITRITE, POC: NEGATIVE
PH, POC: 7
POCT MANUFACTURER: ABNORMAL
PROTEIN, POC: ABNORMAL
SPECIFIC GRAVITY, POC: 1.01
UROBILINOGEN, POC: 0.2

## 2023-07-03 PROCEDURE — 3008F BODY MASS INDEX DOCD: CPT | Performed by: NURSE PRACTITIONER

## 2023-07-03 PROCEDURE — 81002 URINALYSIS NONAUTO W/O SCOPE: CPT | Performed by: NURSE PRACTITIONER

## 2023-07-03 PROCEDURE — 3074F SYST BP LT 130 MM HG: CPT | Performed by: NURSE PRACTITIONER

## 2023-07-03 PROCEDURE — 3078F DIAST BP <80 MM HG: CPT | Performed by: NURSE PRACTITIONER

## 2023-07-03 PROCEDURE — 99213 OFFICE O/P EST LOW 20 MIN: CPT | Performed by: NURSE PRACTITIONER

## 2023-07-03 RX ORDER — MISOPROSTOL 100 UG/1
TABLET ORAL
COMMUNITY
Start: 2023-04-17 | End: 2023-07-11 | Stop reason: ALTCHOICE

## 2023-07-03 RX ORDER — NITROFURANTOIN 25; 75 MG/1; MG/1
100 CAPSULE ORAL 2 TIMES DAILY
Qty: 10 CAPSULE | Refills: 0 | Status: SHIPPED | OUTPATIENT
Start: 2023-07-03 | End: 2023-07-08

## 2023-07-03 RX ORDER — IPRATROPIUM BROMIDE 42 UG/1
SPRAY, METERED NASAL
COMMUNITY
Start: 2022-11-22 | End: 2023-07-11 | Stop reason: ALTCHOICE

## 2023-07-03 RX ORDER — DICLOFENAC SODIUM 75 MG/1
TABLET, DELAYED RELEASE ORAL
COMMUNITY
Start: 2023-04-23 | End: 2023-10-17 | Stop reason: ALTCHOICE

## 2023-07-03 RX ORDER — HYDROCORTISONE AND IODOCHLORHYDROXYQUIN 5; 30 MG/G; MG/G
CREAM TOPICAL
COMMUNITY
Start: 2022-11-22 | End: 2023-07-16 | Stop reason: SDUPTHER

## 2023-07-03 ASSESSMENT — ENCOUNTER SYMPTOMS
BACK PAIN: 0
ABDOMINAL DISTENTION: 0
COUGH: 0
CHILLS: 0
PALPITATIONS: 0
WHEEZING: 0
VOMITING: 0
FEVER: 0
DIARRHEA: 0
NAUSEA: 0
SHORTNESS OF BREATH: 0
ABDOMINAL PAIN: 0

## 2023-07-03 NOTE — ASSESSMENT & PLAN NOTE
Urine dip today was positive.  Patient will have a urine culture sent to the lab.  Patient will start Macrobid 100 mg p.o. twice daily and will obtain Azo at the pharmacy for symptom relief.  She will increase her fluids, rest and Tylenol  as needed.  Patient will notify the office if her symptoms are not resolving.

## 2023-07-03 NOTE — PROGRESS NOTES
Internal Medicine Note       Reason for visit: Follow-up (Possible UTI:Urinary Frequency )       HPI   Aurora Cedillo is a 52 y.o. female who presents with urgency and frequency that started in the middle of the night. She has never had UTI in the past. She has burning urgency and frequency. No flank pain or lower abdominal pain.         Past Medical History:   Diagnosis Date   • Allergic rhinitis    • Anxiety 5 years ago   • Arthritis    • Back pain    • BMI 40.0-44.9, adult (CMS/HCC)    • Cellulitis    • COVID-19 virus infection 9/19/2022   • Depression 06/28/2012   • Dysfunction of both eustachian tubes    • Essential hypertension, benign 06/28/2012   • GERD (gastroesophageal reflux disease) After lap band   • Gout 15-20 years ago    No longer have   • High cholesterol 20 years ago   • Hyperlipidemia    • Irregular menses    • Plantar fasciitis 20 years ago   • Right bundle branch block 06/28/2012   • Sensorineural hearing loss, bilateral    • Shingles     on head, age 20's   • Sinusitis    • Skin abnormalities All my life    Several skin issues some related to allergies, stress   • Skin rash    • Sleep apnea    • Snoring    • UTI (urinary tract infection)    • Varicose veins of lower extremity    • Vitamin D deficiency    • Yeast infection      Past Surgical History:   Procedure Laterality Date   • CARPAL TUNNEL RELEASE  25 years ago    Corrected with surgery   • COLONOSCOPY     • ENDOMETRIAL BIOPSY  2022   • KNEE ARTHROSCOPY  15 years ago   • KNEE SURGERY  7 years ago   • LAPAROSCOPIC GASTRIC BANDING  15 years ago?    Unsure of exact timeframe   • PARTIAL KNEE ARTHROPLASTY Left    • SINUS SURGERY     • VAGINAL DELIVERY       Social History     Social History Narrative    Children- 2 sons (16, 10), 1 daughter (13). 9/2021    Caffeine - coffee 1 daily. Iced tea, unsweetened    Exercise- yoga     Diet- intermittent fasting    Pets- dog    [employer] affiliation-     Lives with spouse and three children      Family History   Problem Relation Age of Onset   • Atrial fibrillation Biological Mother    • Anxiety disorder Biological Mother    • Atrial fibrillation Biological Father    • Stroke Biological Father         with cognitive impairment   • Heart disease Biological Father    • Hypertension Biological Father    • Mental illness Biological Father    • Obesity Biological Father    • Atrial fibrillation Biological Brother    • Stroke Biological Brother          of CVA age 52   • ADD / ADHD Biological Daughter    • Anxiety disorder Biological Daughter    • No Known Problems Biological Son    • Autism spectrum disorder Biological Son         high functioning   • ADD / ADHD Biological Son         oldest, declines meds     Neomycin, Neomycin-bacitracnzn-polymyxnb, Penicillins, Latex, and Miconazole  Current Outpatient Medications   Medication Sig Dispense Refill   • BIOTIN ORAL Take 5,000 mg by mouth daily.     • buPROPion XL (WELLBUTRIN XL) 150 mg 24 hr tablet Take 1 tablet (150 mg total) by mouth daily. With 300mg tabs (total 450mg) 90 tablet 3   • buPROPion XL (WELLBUTRIN XL) 300 mg 24 hr tablet Take 1 tablet (300 mg total) by mouth daily. With 150mg tabs (total 450mg) 90 tablet 3   • chromium picolinate 200 mcg tablet Take by mouth.     • crisaborole (EUCRISA) 2 % ointment Eucrisa 2 % topical ointment     • diclofenac (VOLTAREN) 75 mg EC tablet TAKE 1 TABLET BY MOUTH TWICE A DAY FOR 30 DAYS     • famotidine (PEPCID) 40 mg tablet Take 1 tablet (40 mg total) by mouth 2 (two) times a day. 180 tablet 1   • hydrochlorothiazide (HYDRODIURIL) 25 mg tablet TAKE 1 TABLET DAILY 90 tablet 3   • Lactobacillus acidophilus (PROBIOTIC ORAL) Take by mouth daily.     • levonorgestreL (LILETTA) 18.6 mcg/24 hrs (6 yrs) 52 mg intrauterine device intrauterine device 1 kit by intrauterine route once.     • loratadine (CLARITIN) 10 mg tablet Take 10 mg by mouth daily.     • losartan (COZAAR) 50 mg tablet Take 1 tablet (50 mg total) by  mouth daily. 90 tablet 3   • metFORMIN XR (GLUCOPHAGE-XR) 750 mg 24 hr tablet Take 2 tablets (1,500 mg total) by mouth daily with dinner. 180 tablet 1   • nitrofurantoin, macrocrystal-monohydrate, (MACROBID) 100 mg capsule Take 1 capsule (100 mg total) by mouth 2 (two) times a day for 5 days. 10 capsule 0   • rosuvastatin (CRESTOR) 5 mg tablet TAKE 1 TABLET DAILY 90 tablet 3   • spironolactone (ALDACTONE) 100 mg tablet TAKE 1 TABLET DAILY 90 tablet 3   • VASCULERA 630 mg tablet Take 1 tablet by mouth daily. 90 tablet 0   • clioquinol-hydrocortisone (ALA-GILLES) 3-0.5 % cream External     • ipratropium (ATROVENT) 42 mcg (0.06 %) nasal spray Nasal     • L. acidophilus/pectin, citrus (ACIDOPHILUS PROBIOTIC ORAL) Take 0.5 mg by mouth.     • miSOPROStoL (CYTOTEC) 100 mcg tablet half tablet vaginally night before the procedure for 1 dose     • norgestimate-ethinyl estradiol (NICK ORAL) Take by mouth.       No current facility-administered medications for this visit.       Review of Systems   Constitutional: Negative for chills and fever.   Respiratory: Negative for cough, shortness of breath and wheezing.    Cardiovascular: Negative for chest pain and palpitations.   Gastrointestinal: Negative for abdominal distention, abdominal pain, diarrhea, nausea and vomiting.   Musculoskeletal: Negative for back pain.       Objective   Vitals:    07/03/23 1003   BP: 102/60   Pulse: 84   Resp: 19   Temp: 36.7 °C (98 °F)   SpO2: 99%       Physical Exam  Constitutional:       Appearance: Normal appearance.   Eyes:      Conjunctiva/sclera: Conjunctivae normal.   Cardiovascular:      Rate and Rhythm: Normal rate and regular rhythm.      Heart sounds: Normal heart sounds.   Pulmonary:      Effort: Pulmonary effort is normal.      Breath sounds: Normal breath sounds.   Abdominal:      General: Bowel sounds are normal.      Palpations: Abdomen is soft.      Tenderness: There is no right CVA tenderness or left CVA tenderness.    Musculoskeletal:      Cervical back: Neck supple.   Skin:     General: Skin is warm and dry.   Neurological:      Mental Status: She is alert.         Lab Results   Component Value Date    WBC 6.9 11/03/2022    HGB 13.9 11/03/2022     11/03/2022    CHOL 161 11/03/2022    TRIG 102 11/03/2022    HDL 57 11/03/2022    ALT 6 11/03/2022    AST 12 11/03/2022     11/03/2022    K 4.4 11/03/2022    CREATININE 0.74 11/03/2022    TSH 2.300 11/03/2022          Current Outpatient Medications:   •  BIOTIN ORAL, Take 5,000 mg by mouth daily., Disp: , Rfl:   •  buPROPion XL (WELLBUTRIN XL) 150 mg 24 hr tablet, Take 1 tablet (150 mg total) by mouth daily. With 300mg tabs (total 450mg), Disp: 90 tablet, Rfl: 3  •  buPROPion XL (WELLBUTRIN XL) 300 mg 24 hr tablet, Take 1 tablet (300 mg total) by mouth daily. With 150mg tabs (total 450mg), Disp: 90 tablet, Rfl: 3  •  chromium picolinate 200 mcg tablet, Take by mouth., Disp: , Rfl:   •  crisaborole (EUCRISA) 2 % ointment, Eucrisa 2 % topical ointment, Disp: , Rfl:   •  diclofenac (VOLTAREN) 75 mg EC tablet, TAKE 1 TABLET BY MOUTH TWICE A DAY FOR 30 DAYS, Disp: , Rfl:   •  famotidine (PEPCID) 40 mg tablet, Take 1 tablet (40 mg total) by mouth 2 (two) times a day., Disp: 180 tablet, Rfl: 1  •  hydrochlorothiazide (HYDRODIURIL) 25 mg tablet, TAKE 1 TABLET DAILY, Disp: 90 tablet, Rfl: 3  •  Lactobacillus acidophilus (PROBIOTIC ORAL), Take by mouth daily., Disp: , Rfl:   •  levonorgestreL (LILETTA) 18.6 mcg/24 hrs (6 yrs) 52 mg intrauterine device intrauterine device, 1 kit by intrauterine route once., Disp: , Rfl:   •  loratadine (CLARITIN) 10 mg tablet, Take 10 mg by mouth daily., Disp: , Rfl:   •  losartan (COZAAR) 50 mg tablet, Take 1 tablet (50 mg total) by mouth daily., Disp: 90 tablet, Rfl: 3  •  metFORMIN XR (GLUCOPHAGE-XR) 750 mg 24 hr tablet, Take 2 tablets (1,500 mg total) by mouth daily with dinner., Disp: 180 tablet, Rfl: 1  •  nitrofurantoin,  macrocrystal-monohydrate, (MACROBID) 100 mg capsule, Take 1 capsule (100 mg total) by mouth 2 (two) times a day for 5 days., Disp: 10 capsule, Rfl: 0  •  rosuvastatin (CRESTOR) 5 mg tablet, TAKE 1 TABLET DAILY, Disp: 90 tablet, Rfl: 3  •  spironolactone (ALDACTONE) 100 mg tablet, TAKE 1 TABLET DAILY, Disp: 90 tablet, Rfl: 3  •  VASCULERA 630 mg tablet, Take 1 tablet by mouth daily., Disp: 90 tablet, Rfl: 0  •  clioquinol-hydrocortisone (ALA-GILLES) 3-0.5 % cream, External, Disp: , Rfl:   •  ipratropium (ATROVENT) 42 mcg (0.06 %) nasal spray, Nasal, Disp: , Rfl:   •  L. acidophilus/pectin, citrus (ACIDOPHILUS PROBIOTIC ORAL), Take 0.5 mg by mouth., Disp: , Rfl:   •  miSOPROStoL (CYTOTEC) 100 mcg tablet, half tablet vaginally night before the procedure for 1 dose, Disp: , Rfl:   •  norgestimate-ethinyl estradiol (NICK ORAL), Take by mouth., Disp: , Rfl:       Assessment   Diagnoses and all orders for this visit:    Urinary frequency (Primary)  Assessment & Plan:  Urine dip today was positive.  Patient will have a urine culture sent to the lab.  Patient will start Macrobid 100 mg p.o. twice daily and will obtain Azo at the pharmacy for symptom relief.  She will increase her fluids, rest and Tylenol  as needed.  Patient will notify the office if her symptoms are not resolving.    Orders:  -     POCT urinalysis dipstick    Other orders  -     nitrofurantoin, macrocrystal-monohydrate, (MACROBID) 100 mg capsule; Take 1 capsule (100 mg total) by mouth 2 (two) times a day for 5 days.          ISRRAEL Mcdaniel  7/3/2023  10:35 AM

## 2023-07-05 ENCOUNTER — TELEPHONE (OUTPATIENT)
Dept: INTERNAL MEDICINE | Facility: CLINIC | Age: 53
End: 2023-07-05
Payer: COMMERCIAL

## 2023-07-05 LAB
BACTERIA UR CULT: NO GROWTH
BACTERIA UR CULT: NORMAL

## 2023-07-05 NOTE — TELEPHONE ENCOUNTER
Telephone call to patient to let her know that her urine culture was negative.  She did take the Azo for 2 days and she continues with the Macrobid.  She will let us know if her symptoms are not fully resolved when she finishes the Macrobid.

## 2023-07-10 NOTE — TELEPHONE ENCOUNTER
The patient called stating that her UTI seems somewhat better but still feeling some urgency and still having frequency although less but still there. The patient did complete her course of Macrobid.  The patient did a culture before but did take Azo for a couple of days when did culture.  The patient wants to know if she should take another course of antibiotics or do you want her to provide another urine sample?

## 2023-07-11 ENCOUNTER — OFFICE VISIT (OUTPATIENT)
Dept: BARIATRICS/WEIGHT MGMT | Facility: CLINIC | Age: 53
End: 2023-07-11
Payer: COMMERCIAL

## 2023-07-11 VITALS
RESPIRATION RATE: 18 BRPM | WEIGHT: 162.5 LBS | BODY MASS INDEX: 29.9 KG/M2 | DIASTOLIC BLOOD PRESSURE: 60 MMHG | SYSTOLIC BLOOD PRESSURE: 110 MMHG | HEART RATE: 76 BPM | HEIGHT: 62 IN | OXYGEN SATURATION: 98 %

## 2023-07-11 DIAGNOSIS — E66.3 OVERWEIGHT: ICD-10-CM

## 2023-07-11 DIAGNOSIS — R60.9 LIPEDEMA: Primary | ICD-10-CM

## 2023-07-11 PROCEDURE — 3078F DIAST BP <80 MM HG: CPT | Performed by: FAMILY MEDICINE

## 2023-07-11 PROCEDURE — 99213 OFFICE O/P EST LOW 20 MIN: CPT | Performed by: FAMILY MEDICINE

## 2023-07-11 PROCEDURE — 3074F SYST BP LT 130 MM HG: CPT | Performed by: FAMILY MEDICINE

## 2023-07-11 PROCEDURE — 3008F BODY MASS INDEX DOCD: CPT | Performed by: FAMILY MEDICINE

## 2023-07-11 RX ORDER — HYDROCORTISONE AND IODOCHLORHYDROXYQUIN 5; 30 MG/G; MG/G
CREAM TOPICAL
COMMUNITY
Start: 2022-11-22

## 2023-07-11 RX ORDER — METFORMIN HYDROCHLORIDE 750 MG/1
1500 TABLET, EXTENDED RELEASE ORAL
Qty: 180 TABLET | Refills: 1 | Status: SHIPPED | OUTPATIENT
Start: 2023-07-11 | End: 2024-03-07 | Stop reason: SDUPTHER

## 2023-07-11 NOTE — PROGRESS NOTES
HISTORY OF PRESENT ILLNESS        Aurora Cedillo is a 52 y.o. female following up on lifestyle management and weight loss as adjunctive treatment strategies forHypertension, Dyslipidemia, Varicose veins, Lipedema, joint pain, Back pain and History of Bariatric surgery.      Visit history:    Medication Trials and Contraindications: Phentermine-already on high-dose Wellbutrin    Nutrition recommendation: 1350 Calorie lipedema plan, 3 meals and 0 snacks in 12 hours or less    CWWP intake: 08/23/22  Weight : 172.2         BF%: 37.2   REE: 1340  Lipedema education-start Vasculera, insulin resistant education, PFF plan given, restart yoga  12/01/22: 168.7: taking Vasculera, doing physical therapy. Start metformin.   Possible glp-1 from jazzy  03/06/23: 163:: stop Vasculera, Increase metformin to 1500 mg a day.       Medication(s) used to support lifestyle modifications:Metformin  Taking medications as prescribed:yes  Side effects: none    Interval history:   Chart reviewed since our last visit.    Reviewed most recent labs and Reviewed last Registered Dietitian's notes         Current Outpatient Medications on File Prior to Visit   Medication Sig Dispense Refill   • BIOTIN ORAL Take 5,000 mg by mouth daily.     • chromium picolinate 200 mcg tablet Take by mouth.     • clioquinol-hydrocortisone (ALA-GILLES) 3-0.5 % cream External     • clioquinol-hydrocortisone (ALA-GILLES) 3-0.5 % cream External     • crisaborole (EUCRISA) 2 % ointment Eucrisa 2 % topical ointment     • diclofenac (VOLTAREN) 75 mg EC tablet TAKE 1 TABLET BY MOUTH TWICE A DAY FOR 30 DAYS     • hydrochlorothiazide (HYDRODIURIL) 25 mg tablet TAKE 1 TABLET DAILY 90 tablet 3   • L. acidophilus/pectin, citrus (ACIDOPHILUS PROBIOTIC ORAL) Take 0.5 mg by mouth.     • Lactobacillus acidophilus (PROBIOTIC ORAL) Take by mouth daily.     • levonorgestreL (LILETTA) 18.6 mcg/24 hrs (6 yrs) 52 mg intrauterine device intrauterine device 1 kit by intrauterine route once.  "    • loratadine (CLARITIN) 10 mg tablet Take 10 mg by mouth daily.     • losartan (COZAAR) 50 mg tablet Take 1 tablet (50 mg total) by mouth daily. 90 tablet 3   • rosuvastatin (CRESTOR) 5 mg tablet TAKE 1 TABLET DAILY 90 tablet 3   • spironolactone (ALDACTONE) 100 mg tablet TAKE 1 TABLET DAILY 90 tablet 3   • VASCULERA 630 mg tablet Take 1 tablet by mouth daily. 90 tablet 0   • buPROPion XL (WELLBUTRIN XL) 150 mg 24 hr tablet Take 1 tablet (150 mg total) by mouth daily. With 300mg tabs (total 450mg) 90 tablet 3   • buPROPion XL (WELLBUTRIN XL) 300 mg 24 hr tablet Take 1 tablet (300 mg total) by mouth daily. With 150mg tabs (total 450mg) 90 tablet 3   • famotidine (PEPCID) 40 mg tablet Take 1 tablet (40 mg total) by mouth 2 (two) times a day. 180 tablet 1     No current facility-administered medications on file prior to visit.          Neomycin, Neomycin-bacitracnzn-polymyxnb, Penicillins, Latex, and Miconazole         PHYSICAL EXAMINATION      Visit Vitals  /60 (BP Location: Left upper arm, Patient Position: Sitting)   Pulse 76   Resp 18   Ht 1.575 m (5' 2\")   Wt 73.7 kg (162 lb 8 oz)   SpO2 98%   BMI 29.72 kg/m²      Body mass index is 29.72 kg/m².    Physical Exam           ASSESSMENT AND PLAN         Overweight  Sustained 10 pounds loss with current plan.     You will not reach your goal if you do not do the strength and lymphatic strategies.     Continue with metformin at current dose.      See RD for support and accountability.      Stress and cortisol are playing a factor in weight regulation: Consider online counseling.      Lipedema  Treating Obesity is an appropriate and recommended adjunct to conventional medications See treatment strategy under assessment and plan for overweight/obesity.             Thank you very much for allowing us to participate in the care of your patient. Please do not hesitate to call or email if there are any questions.     "

## 2023-07-11 NOTE — ASSESSMENT & PLAN NOTE
Sustained 10 pounds loss with current plan.     You will not reach your goal if you do not do the strength and lymphatic strategies.     Continue with metformin at current dose.      See RD for support and accountability.      Stress and cortisol are playing a factor in weight regulation: Consider online counseling.

## 2023-07-12 ENCOUNTER — CLINICAL SUPPORT (OUTPATIENT)
Dept: INTERNAL MEDICINE | Facility: CLINIC | Age: 53
End: 2023-07-12
Payer: COMMERCIAL

## 2023-07-12 DIAGNOSIS — N39.0 UTI (URINARY TRACT INFECTION), UNCOMPLICATED: ICD-10-CM

## 2023-07-12 PROCEDURE — 200200 PR NO CHARGE: Performed by: NURSE PRACTITIONER

## 2023-07-13 ENCOUNTER — TELEPHONE (OUTPATIENT)
Dept: INTERNAL MEDICINE | Facility: CLINIC | Age: 53
End: 2023-07-13
Payer: COMMERCIAL

## 2023-07-13 LAB
APPEARANCE UR: CLEAR
BACTERIA #/AREA URNS HPF: NORMAL /[HPF]
BILIRUB UR QL STRIP: NEGATIVE
CASTS URNS QL MICRO: NORMAL /LPF
COLOR UR: YELLOW
EPI CELLS #/AREA URNS HPF: NORMAL /HPF (ref 0–10)
GLUCOSE UR QL STRIP: NEGATIVE
HGB UR QL STRIP: NEGATIVE
KETONES UR QL STRIP: NEGATIVE
LEUKOCYTE ESTERASE UR QL STRIP: NEGATIVE
MICRO URNS: NORMAL
MICRO URNS: NORMAL
NITRITE UR QL STRIP: NEGATIVE
PH UR STRIP: 6.5 [PH] (ref 5–7.5)
PROT UR QL STRIP: NEGATIVE
RBC #/AREA URNS HPF: NORMAL /HPF (ref 0–2)
SP GR UR STRIP: 1.01 (ref 1–1.03)
UROBILINOGEN UR STRIP-MCNC: 0.2 MG/DL (ref 0.2–1)
WBC #/AREA URNS HPF: NORMAL /HPF (ref 0–5)

## 2023-07-14 LAB
BACTERIA UR CULT: NO GROWTH
BACTERIA UR CULT: NORMAL

## 2023-07-17 NOTE — ASSESSMENT & PLAN NOTE
Treating Obesity is an appropriate and recommended adjunct to conventional medications See treatment strategy under assessment and plan for overweight/obesity.

## 2023-07-19 ENCOUNTER — TELEPHONE (OUTPATIENT)
Dept: INTERNAL MEDICINE | Facility: CLINIC | Age: 53
End: 2023-07-19
Payer: COMMERCIAL

## 2023-08-21 ENCOUNTER — CLINICAL SUPPORT (OUTPATIENT)
Dept: BARIATRICS/WEIGHT MGMT | Facility: CLINIC | Age: 53
End: 2023-08-21
Payer: COMMERCIAL

## 2023-08-21 VITALS — BODY MASS INDEX: 30.67 KG/M2 | WEIGHT: 167.7 LBS

## 2023-08-21 DIAGNOSIS — Z71.3 DIETARY COUNSELING: ICD-10-CM

## 2023-08-21 DIAGNOSIS — E78.49 OTHER HYPERLIPIDEMIA: ICD-10-CM

## 2023-08-21 DIAGNOSIS — E66.3 OVERWEIGHT: Primary | ICD-10-CM

## 2023-08-21 DIAGNOSIS — R60.9 LIPEDEMA: ICD-10-CM

## 2023-08-21 DIAGNOSIS — R63.4 WEIGHT LOSS: ICD-10-CM

## 2023-08-21 PROCEDURE — 97803 MED NUTRITION INDIV SUBSEQ: CPT | Performed by: DIETITIAN, REGISTERED

## 2023-08-21 NOTE — PATIENT INSTRUCTIONS
"Problem List Items Addressed This Visit          Endocrine/Metabolic    Overweight - Primary     Time   Description of Meal or Snack   Proteins Fats   High Fiber Vegetables Carbs Condiments Off Plan    7am Coffee with ½ and ½ with stevia                    8am-12pm Greek Yogurt + Preserves or fruit   1   1     snack Taco salad -lettuce/veggies -3 ounces chicken/tuna, 1/2can black beans, cheese, ranch / Russian dressing  1.5 2 + 0.5      5-6pm 6 ounces meat or fish + veggies + potato/butter or sour cream 2 2 + 1     8pm 1/4th cup honey roasted peanuts 0.5 1  0.5      MY TOTALS FOR THE DAY 5 5 + 3      MY PLAN GOALS 4-5 4-5 4+ 0-3         Commit to daily food journaling on paper - use a blank notebook or the Healthy Lifestyles paper food journal provided at your first visit. Food journaling is used for accountability and to identify patterns in intake that may contribute to your appetite, energy level, and mood. Remember to account for off-plan food items and any non-water beverages. If you are unsure how to track combination foods or foods that may not be listed on our recommended food guide, utilize the following two-step process:    Step 1: Read the ingredients list and ask yourself, \"what is this food actually made up of?\" The first ingredient is the ingredient found in the largest quantity, so this may help you determine which food category the item falls into.    Step 2: Read the Nutrition Facts label and compare to the following ranges. Estimate portion size and round up or down as needed.    1 protein choice = 12-30 grams of protein  1 carbohydrate (moderate or high glycemic) choice = 15-25 grams of total carbohydrate  1 fat choice = 8-12 grams of fat           Weight loss       Dermatologic    Lipedema       Other    Hyperlipidemia    Dietary counseling       "

## 2023-08-21 NOTE — ASSESSMENT & PLAN NOTE
"Time   Description of Meal or Snack   Proteins Fats   High Fiber Vegetables Carbs Condiments Off Plan    7am Coffee with ½ and ½ with stevia                    8am-12pm Greek Yogurt + Preserves or fruit   1   1     snack Taco salad -lettuce/veggies -3 ounces chicken/tuna, 1/2can black beans, cheese, ranch / Russian dressing  1.5 2 + 0.5      5-6pm 6 ounces meat or fish + veggies + potato/butter or sour cream 2 2 + 1     8pm 1/4th cup honey roasted peanuts 0.5 1  0.5      MY TOTALS FOR THE DAY 5 5 + 3      MY PLAN GOALS 4-5 4-5 4+ 0-3         Commit to daily food journaling on paper - use a blank notebook or the Healthy Lifestyles paper food journal provided at your first visit. Food journaling is used for accountability and to identify patterns in intake that may contribute to your appetite, energy level, and mood. Remember to account for off-plan food items and any non-water beverages. If you are unsure how to track combination foods or foods that may not be listed on our recommended food guide, utilize the following two-step process:    Step 1: Read the ingredients list and ask yourself, \"what is this food actually made up of?\" The first ingredient is the ingredient found in the largest quantity, so this may help you determine which food category the item falls into.    Step 2: Read the Nutrition Facts label and compare to the following ranges. Estimate portion size and round up or down as needed.    1 protein choice = 12-30 grams of protein  1 carbohydrate (moderate or high glycemic) choice = 15-25 grams of total carbohydrate  1 fat choice = 8-12 grams of fat    "

## 2023-08-21 NOTE — PROGRESS NOTES
"DETAILS OF VISIT     Consulting Service:  [facility] Comprehensive Weight and Wellness Program - Dietitian    Referring Physician: Caren Guardado MD    PCP: Caren Guardado MD    Reason for Consultation:   Chief Complaint   Patient presents with   • Nutrition Counseling       I met with Aurora Cedillo today for Nutrition Counseling [Z71.3] and dietary education related to the following:    E66.9 - Obesity, unspecified - obesity NOS and Z68.31 - BMI 31.0-31.9, adult       VISIT DESCRIPTION         Aurora Cedillo is a 52 y.o. female here for nutrition follow-up.    Current Weight:   Wt Readings from Last 1 Encounters:   08/21/23 76.1 kg (167 lb 11.2 oz)     Height:   Ht Readings from Last 1 Encounters:   07/11/23 1.575 m (5' 2\")        BMI: Body mass index is 30.67 kg/m².    Weight Trends:    Wt Readings from Last 3 Encounters:   08/21/23 76.1 kg (167 lb 11.2 oz)   07/11/23 73.7 kg (162 lb 8 oz)   07/03/23 73.2 kg (161 lb 6.4 oz)       BMI Readings from Last 3 Encounters:   08/21/23 30.67 kg/m²   07/11/23 29.72 kg/m²   07/03/23 29.52 kg/m²       Patient seen by this RD for evaluation in the Comprehensive Weight & Wellness Program. During today's visit, we discussed the following:    Nutrition, Physical Activity, & Emotional Wellness:   Recommended Nutrition Plan  1350 HLD diet + Wellbutrin + Metformin   Meal Structure  5 or less eating events recommended   Eating Interval  Less than 12 hours recommended   Intake Logging  Healthy Lifestyles paper food journal or electronic (Baritastic, MFP, LoseIt, Carb Manager, etc)    Successes   Restart with [facility]  Food type-LGI macros, will discuss HLD plan with follow-up if necessary   Food timing - 8am-8pm goal, also enjoys idea of 12pm-8pm eating window     Goal: food logging in journal vs. Paper -healthy journal discussed in detail with serving size goals.      Medication -taking Wellbutrin and metformin      Challenges Emotional wellness -high stress, son is starting college " in fall, frustrated with decreased focus on her health goals.     Medicine -Ozempic ordered, never started. History of lap band surgery.     Dedicated exercise -joining [employer], hopes to go with      Cooking -does not like to cook    Wt gain through summer with decreased attention on health and wellness and vacations    -decreased protein intake, increased carb/fat intake     Allergies:   Latex (apple, avocado, banana, carrot, celery, chestnut, kiwi, melons, papaya, raw potato, tomato)     Education Provided Navigating obstacles listed above and outlined in goals below     The following hand-outs and/or educational materials were given: Healthy lifestyle journal, LGI hormone balance plan, how to read a food label      A patient-centered approach using motivational interviewing was used to develop the plan and recommendations outlined below.     Time Spent with Patient for face to face office visit: 1 hour      MEDICATIONS, LABS, AND ALLERGIES     Current Outpatient Medications on File Prior to Visit   Medication Sig Dispense Refill   • BIOTIN ORAL Take 5,000 mg by mouth daily.     • buPROPion XL (WELLBUTRIN XL) 150 mg 24 hr tablet Take 1 tablet (150 mg total) by mouth daily. With 300mg tabs (total 450mg) 90 tablet 3   • buPROPion XL (WELLBUTRIN XL) 300 mg 24 hr tablet Take 1 tablet (300 mg total) by mouth daily. With 150mg tabs (total 450mg) 90 tablet 3   • chromium picolinate 200 mcg tablet Take by mouth.     • clioquinol-hydrocortisone (ALA-GILLES) 3-0.5 % cream External     • crisaborole (EUCRISA) 2 % ointment Eucrisa 2 % topical ointment     • diclofenac (VOLTAREN) 75 mg EC tablet TAKE 1 TABLET BY MOUTH TWICE A DAY FOR 30 DAYS     • famotidine (PEPCID) 40 mg tablet Take 1 tablet (40 mg total) by mouth 2 (two) times a day. 180 tablet 1   • hydrochlorothiazide (HYDRODIURIL) 25 mg tablet TAKE 1 TABLET DAILY 90 tablet 3   • L. acidophilus/pectin, citrus (ACIDOPHILUS PROBIOTIC ORAL) Take 0.5 mg by mouth.      • Lactobacillus acidophilus (PROBIOTIC ORAL) Take by mouth daily.     • levonorgestreL (LILETTA) 18.6 mcg/24 hrs (6 yrs) 52 mg intrauterine device intrauterine device 1 kit by intrauterine route once.     • loratadine (CLARITIN) 10 mg tablet Take 10 mg by mouth daily.     • losartan (COZAAR) 50 mg tablet Take 1 tablet (50 mg total) by mouth daily. 90 tablet 3   • metFORMIN XR (GLUCOPHAGE-XR) 750 mg 24 hr tablet Take 2 tablets (1,500 mg total) by mouth daily with dinner. 180 tablet 1   • rosuvastatin (CRESTOR) 5 mg tablet TAKE 1 TABLET DAILY 90 tablet 3   • spironolactone (ALDACTONE) 100 mg tablet TAKE 1 TABLET DAILY 90 tablet 3   • VASCULERA 630 mg tablet Take 1 tablet by mouth daily. 90 tablet 0     No current facility-administered medications on file prior to visit.         Neomycin, Neomycin-bacitracnzn-polymyxnb, Penicillins, Latex, and Miconazole       PATIENT-LED GOALS     Overweight  Time   Description of Meal or Snack   Proteins Fats   High Fiber Vegetables Carbs Condiments Off Plan    7am Coffee with ½ and ½ with stevia                    8am-12pm Greek Yogurt + Preserves or fruit   1   1     snack Taco salad -lettuce/veggies -3 ounces chicken/tuna, 1/2can black beans, cheese, ranch / Russian dressing  1.5 2 + 0.5      5-6pm 6 ounces meat or fish + veggies + potato/butter or sour cream 2 2 + 1     8pm 1/4th cup honey roasted peanuts 0.5 1  0.5      MY TOTALS FOR THE DAY 5 5 + 3      MY PLAN GOALS 4-5 4-5 4+ 0-3         Commit to daily food journaling on paper - use a blank notebook or the Healthy Lifestyles paper food journal provided at your first visit. Food journaling is used for accountability and to identify patterns in intake that may contribute to your appetite, energy level, and mood. Remember to account for off-plan food items and any non-water beverages. If you are unsure how to track combination foods or foods that may not be listed on our recommended food guide, utilize the following two-step  "process:    Step 1: Read the ingredients list and ask yourself, \"what is this food actually made up of?\" The first ingredient is the ingredient found in the largest quantity, so this may help you determine which food category the item falls into.    Step 2: Read the Nutrition Facts label and compare to the following ranges. Estimate portion size and round up or down as needed.    1 protein choice = 12-30 grams of protein  1 carbohydrate (moderate or high glycemic) choice = 15-25 grams of total carbohydrate  1 fat choice = 8-12 grams of fat          Yasemin Pereyra RD, LDN  Registered Dietitian for [facility] and Wellness Program  8/21/2023     Thank you very much for allowing us to participate in the care of your patient. Please do not hesitate to call or email if there are any questions.     "

## 2023-09-16 DIAGNOSIS — R60.9 LIPEDEMA: ICD-10-CM

## 2023-09-18 RX ORDER — BUPROPION HYDROCHLORIDE 300 MG/1
TABLET ORAL
Qty: 90 TABLET | Refills: 3 | Status: SHIPPED | OUTPATIENT
Start: 2023-09-18 | End: 2024-08-20

## 2023-09-18 RX ORDER — DIOSMIN COMPLEX NO.1 630 MG
1 TABLET ORAL DAILY
Qty: 90 TABLET | Refills: 0 | Status: SHIPPED | OUTPATIENT
Start: 2023-09-18 | End: 2024-01-12

## 2023-09-18 RX ORDER — BUPROPION HYDROCHLORIDE 150 MG/1
TABLET ORAL
Qty: 90 TABLET | Refills: 3 | Status: SHIPPED | OUTPATIENT
Start: 2023-09-18 | End: 2024-08-20

## 2023-10-11 ENCOUNTER — OFFICE VISIT (OUTPATIENT)
Dept: INTERNAL MEDICINE | Facility: CLINIC | Age: 53
End: 2023-10-11
Payer: COMMERCIAL

## 2023-10-11 VITALS
HEART RATE: 70 BPM | HEIGHT: 62 IN | TEMPERATURE: 98.4 F | SYSTOLIC BLOOD PRESSURE: 106 MMHG | BODY MASS INDEX: 30.55 KG/M2 | WEIGHT: 166 LBS | DIASTOLIC BLOOD PRESSURE: 64 MMHG | OXYGEN SATURATION: 97 %

## 2023-10-11 DIAGNOSIS — J06.9 ACUTE URI: Primary | ICD-10-CM

## 2023-10-11 PROCEDURE — 3008F BODY MASS INDEX DOCD: CPT | Performed by: NURSE PRACTITIONER

## 2023-10-11 PROCEDURE — 3078F DIAST BP <80 MM HG: CPT | Performed by: NURSE PRACTITIONER

## 2023-10-11 PROCEDURE — 99213 OFFICE O/P EST LOW 20 MIN: CPT | Performed by: NURSE PRACTITIONER

## 2023-10-11 PROCEDURE — 3074F SYST BP LT 130 MM HG: CPT | Performed by: NURSE PRACTITIONER

## 2023-10-11 RX ORDER — CIPROFLOXACIN 500 MG/1
500 TABLET ORAL 2 TIMES DAILY
Qty: 14 TABLET | Refills: 0 | Status: SHIPPED | OUTPATIENT
Start: 2023-10-11 | End: 2023-10-18

## 2023-10-11 ASSESSMENT — ENCOUNTER SYMPTOMS
EYE REDNESS: 0
HEADACHES: 1
SHORTNESS OF BREATH: 0
FATIGUE: 1
SINUS PAIN: 1
SINUS PRESSURE: 1
COUGH: 1
CHILLS: 1
VOMITING: 0
FEVER: 0
SORE THROAT: 0
ABDOMINAL PAIN: 0
NAUSEA: 1
EYE PAIN: 0
PALPITATIONS: 0
RHINORRHEA: 1
EYE ITCHING: 0
WHEEZING: 0
EYE DISCHARGE: 0
DIARRHEA: 0
CHEST TIGHTNESS: 0
ABDOMINAL DISTENTION: 0

## 2023-10-11 NOTE — ASSESSMENT & PLAN NOTE
Pt will start cipro 500mg po bid x 7 days.increase fluid, rest, tylenol /advil as needed. Continue with decongestant. rto in 1 week if sx are not improving.

## 2023-10-11 NOTE — PROGRESS NOTES
Internal Medicine Note       Reason for visit: Cough, Nasal Congestion (Yellow mucus), Fatigue, and Sinus Problem       HPI   Aurora Cedillo is a 53 y.o. female who presents with cough, nasal congestion, fatigue and sinus congestion. Nasal d/c is yellow. Cough for yellow phlegm. States she is taking sudafed which is helping her sinuses but it makes mucous land in the back of her throat. Has been sick for over 2 weeks        Past Medical History:   Diagnosis Date    Allergic rhinitis     Anxiety 5 years ago    Arthritis     Back pain     BMI 40.0-44.9, adult (CMS/[facility])     Cellulitis     COVID-19 virus infection 9/19/2022    Depression 06/28/2012    Dysfunction of both eustachian tubes     Essential hypertension, benign 06/28/2012    GERD (gastroesophageal reflux disease) After lap band    Gout 15-20 years ago    No longer have    High cholesterol 20 years ago    Hyperlipidemia     Irregular menses     Plantar fasciitis 20 years ago    Right bundle branch block 06/28/2012    Sensorineural hearing loss, bilateral     Shingles     on head, age 20's    Sinusitis     Skin abnormalities All my life    Several skin issues some related to allergies, stress    Skin rash     Sleep apnea     Snoring     UTI (urinary tract infection)     Varicose veins of lower extremity     Vitamin D deficiency     Yeast infection      Past Surgical History:   Procedure Laterality Date    CARPAL TUNNEL RELEASE  25 years ago    Corrected with surgery    COLONOSCOPY      ENDOMETRIAL BIOPSY  2022    KNEE ARTHROSCOPY  15 years ago    KNEE SURGERY  7 years ago    LAPAROSCOPIC GASTRIC BANDING  15 years ago?    Unsure of exact timeframe    PARTIAL KNEE ARTHROPLASTY Left     SINUS SURGERY      VAGINAL DELIVERY       Social History     Social History Narrative    Children- 2 sons (16, 10), 1 daughter (13). 9/2021    Caffeine - coffee 1 daily. Iced tea, unsweetened    Exercise- yoga     Diet- intermittent  fasting    Pets- dog    Jew affiliation-     Lives with spouse and three children     Family History   Problem Relation Age of Onset    Atrial fibrillation Biological Mother     Anxiety disorder Biological Mother     Atrial fibrillation Biological Father     Stroke Biological Father         with cognitive impairment    Heart disease Biological Father     Hypertension Biological Father     Mental illness Biological Father     Obesity Biological Father     Atrial fibrillation Biological Brother     Stroke Biological Brother          of CVA age 52    ADD / ADHD Biological Daughter     Anxiety disorder Biological Daughter     No Known Problems Biological Son     Autism spectrum disorder Biological Son         high functioning    ADD / ADHD Biological Son         oldest, declines meds     Neomycin, Neomycin-bacitracnzn-polymyxnb, Penicillins, Latex, and Miconazole  Current Outpatient Medications   Medication Sig Dispense Refill    BIOTIN ORAL Take 5,000 mg by mouth daily.      buPROPion XL (WELLBUTRIN XL) 150 mg 24 hr tablet TAKE 1 TABLET DAILY. TAKE WITH 300 MG TABLETS (TOTAL 450 MG) 90 tablet 3    buPROPion XL (WELLBUTRIN XL) 300 mg 24 hr tablet TAKE 1 TABLET DAILY. TAKE WITH 150 MG TABLETS (TOTAL 450 MG) 90 tablet 3    ciprofloxacin (CIPRO) 500 mg tablet Take 1 tablet (500 mg total) by mouth 2 (two) times a day for 7 days. 14 tablet 0    clioquinol-hydrocortisone (ALA-GILLES) 3-0.5 % cream External      crisaborole (EUCRISA) 2 % ointment Eucrisa 2 % topical ointment      famotidine (PEPCID) 40 mg tablet Take 1 tablet (40 mg total) by mouth 2 (two) times a day. 180 tablet 1    hydrochlorothiazide (HYDRODIURIL) 25 mg tablet TAKE 1 TABLET DAILY (Patient taking differently: Take 12.5 mg by mouth daily.) 90 tablet 3    L. acidophilus/pectin, citrus (ACIDOPHILUS PROBIOTIC ORAL) Take 0.5 mg by mouth.      Lactobacillus acidophilus (PROBIOTIC ORAL) Take by mouth daily.      levonorgestreL  (LILETTA) 18.6 mcg/24 hrs (6 yrs) 52 mg intrauterine device intrauterine device 1 kit by intrauterine route once.      loratadine (CLARITIN) 10 mg tablet Take 10 mg by mouth daily.      losartan (COZAAR) 50 mg tablet Take 1 tablet (50 mg total) by mouth daily. 90 tablet 3    metFORMIN XR (GLUCOPHAGE-XR) 750 mg 24 hr tablet Take 2 tablets (1,500 mg total) by mouth daily with dinner. 180 tablet 1    rosuvastatin (CRESTOR) 5 mg tablet TAKE 1 TABLET DAILY 90 tablet 3    spironolactone (ALDACTONE) 100 mg tablet TAKE 1 TABLET DAILY 90 tablet 3    VASCULERA 630 mg tablet Take one tablet by mouth daily 90 tablet 0    chromium picolinate 200 mcg tablet Take by mouth.      diclofenac (VOLTAREN) 75 mg EC tablet TAKE 1 TABLET BY MOUTH TWICE A DAY FOR 30 DAYS       No current facility-administered medications for this visit.       Review of Systems   Constitutional: Positive for chills and fatigue. Negative for fever.   HENT: Positive for congestion, ear pain, postnasal drip, rhinorrhea, sinus pressure and sinus pain. Negative for sore throat.    Eyes: Negative for pain, discharge, redness and itching.        Watery   Respiratory: Positive for cough. Negative for chest tightness, shortness of breath and wheezing.    Cardiovascular: Negative for chest pain and palpitations.   Gastrointestinal: Positive for nausea. Negative for abdominal distention, abdominal pain, diarrhea and vomiting.   Neurological: Positive for headaches.       Objective   Vitals:    10/11/23 1122   BP: 106/64   Pulse: 70   Temp: 36.9 °C (98.4 °F)   SpO2: 97%       Physical Exam  Constitutional:       Appearance: Normal appearance.   HENT:      Right Ear: Tympanic membrane and external ear normal.      Left Ear: Tympanic membrane and external ear normal.      Mouth/Throat:      Mouth: Mucous membranes are moist.      Pharynx: Posterior oropharyngeal erythema present. No oropharyngeal exudate.   Eyes:      Conjunctiva/sclera: Conjunctivae normal.    Cardiovascular:      Rate and Rhythm: Normal rate and regular rhythm.      Heart sounds: Normal heart sounds.   Pulmonary:      Effort: Pulmonary effort is normal.      Breath sounds: Normal breath sounds.   Abdominal:      General: Bowel sounds are normal. There is no distension.      Palpations: Abdomen is soft.      Tenderness: There is no abdominal tenderness.   Musculoskeletal:      Cervical back: Neck supple.   Lymphadenopathy:      Cervical: No cervical adenopathy.   Skin:     General: Skin is warm and dry.   Neurological:      Mental Status: She is alert.         Lab Results   Component Value Date    WBC 6.9 11/03/2022    HGB 13.9 11/03/2022     11/03/2022    CHOL 161 11/03/2022    TRIG 102 11/03/2022    HDL 57 11/03/2022    ALT 6 11/03/2022    AST 12 11/03/2022     11/03/2022    K 4.4 11/03/2022    CREATININE 0.74 11/03/2022    TSH 2.300 11/03/2022          Current Outpatient Medications:     BIOTIN ORAL, Take 5,000 mg by mouth daily., Disp: , Rfl:     buPROPion XL (WELLBUTRIN XL) 150 mg 24 hr tablet, TAKE 1 TABLET DAILY. TAKE WITH 300 MG TABLETS (TOTAL 450 MG), Disp: 90 tablet, Rfl: 3    buPROPion XL (WELLBUTRIN XL) 300 mg 24 hr tablet, TAKE 1 TABLET DAILY. TAKE WITH 150 MG TABLETS (TOTAL 450 MG), Disp: 90 tablet, Rfl: 3    ciprofloxacin (CIPRO) 500 mg tablet, Take 1 tablet (500 mg total) by mouth 2 (two) times a day for 7 days., Disp: 14 tablet, Rfl: 0    clioquinol-hydrocortisone (ALA-GILLES) 3-0.5 % cream, External, Disp: , Rfl:     crisaborole (EUCRISA) 2 % ointment, Eucrisa 2 % topical ointment, Disp: , Rfl:     famotidine (PEPCID) 40 mg tablet, Take 1 tablet (40 mg total) by mouth 2 (two) times a day., Disp: 180 tablet, Rfl: 1    hydrochlorothiazide (HYDRODIURIL) 25 mg tablet, TAKE 1 TABLET DAILY (Patient taking differently: Take 12.5 mg by mouth daily.), Disp: 90 tablet, Rfl: 3    L. acidophilus/pectin, citrus (ACIDOPHILUS PROBIOTIC ORAL), Take 0.5 mg by mouth., Disp: , Rfl:      Lactobacillus acidophilus (PROBIOTIC ORAL), Take by mouth daily., Disp: , Rfl:     levonorgestreL (LILETTA) 18.6 mcg/24 hrs (6 yrs) 52 mg intrauterine device intrauterine device, 1 kit by intrauterine route once., Disp: , Rfl:     loratadine (CLARITIN) 10 mg tablet, Take 10 mg by mouth daily., Disp: , Rfl:     losartan (COZAAR) 50 mg tablet, Take 1 tablet (50 mg total) by mouth daily., Disp: 90 tablet, Rfl: 3    metFORMIN XR (GLUCOPHAGE-XR) 750 mg 24 hr tablet, Take 2 tablets (1,500 mg total) by mouth daily with dinner., Disp: 180 tablet, Rfl: 1    rosuvastatin (CRESTOR) 5 mg tablet, TAKE 1 TABLET DAILY, Disp: 90 tablet, Rfl: 3    spironolactone (ALDACTONE) 100 mg tablet, TAKE 1 TABLET DAILY, Disp: 90 tablet, Rfl: 3    VASCULERA 630 mg tablet, Take one tablet by mouth daily, Disp: 90 tablet, Rfl: 0    chromium picolinate 200 mcg tablet, Take by mouth., Disp: , Rfl:     diclofenac (VOLTAREN) 75 mg EC tablet, TAKE 1 TABLET BY MOUTH TWICE A DAY FOR 30 DAYS, Disp: , Rfl:       Assessment   Diagnoses and all orders for this visit:    Acute URI (Primary)  Assessment & Plan:  Pt will start cipro 500mg po bid x 7 days.increase fluid, rest, tylenol /advil as needed. Continue with decongestant. rto in 1 week if sx are not improving.       Other orders  -     ciprofloxacin (CIPRO) 500 mg tablet; Take 1 tablet (500 mg total) by mouth 2 (two) times a day for 7 days.          ISRRAEL Mcdaniel  10/11/2023  11:32 AM

## 2023-10-15 ENCOUNTER — NURSE TRIAGE (OUTPATIENT)
Dept: INTERNAL MEDICINE | Facility: CLINIC | Age: 53
End: 2023-10-15
Payer: COMMERCIAL

## 2023-10-15 RX ORDER — FLUCONAZOLE 150 MG/1
TABLET ORAL
Qty: 2 TABLET | Refills: 0 | Status: SHIPPED | OUTPATIENT
Start: 2023-10-15 | End: 2024-10-11

## 2023-10-15 NOTE — TELEPHONE ENCOUNTER
Patient called stating she is taking antibiotics for yeast infection that started today. Sent Diflucan to pharmacy. If symptoms do not get better or become worse will follow up. Pt agreed and verbalized understanding with plan of care.

## 2023-10-17 ENCOUNTER — OFFICE VISIT (OUTPATIENT)
Dept: BARIATRICS/WEIGHT MGMT | Facility: CLINIC | Age: 53
End: 2023-10-17
Payer: COMMERCIAL

## 2023-10-17 VITALS
HEART RATE: 73 BPM | BODY MASS INDEX: 30.18 KG/M2 | WEIGHT: 164 LBS | RESPIRATION RATE: 18 BRPM | DIASTOLIC BLOOD PRESSURE: 60 MMHG | OXYGEN SATURATION: 99 % | SYSTOLIC BLOOD PRESSURE: 100 MMHG | HEIGHT: 62 IN

## 2023-10-17 DIAGNOSIS — E66.3 OVERWEIGHT: ICD-10-CM

## 2023-10-17 DIAGNOSIS — R60.9 LIPEDEMA: Primary | ICD-10-CM

## 2023-10-17 DIAGNOSIS — I10 ESSENTIAL HYPERTENSION, BENIGN: ICD-10-CM

## 2023-10-17 PROCEDURE — 99214 OFFICE O/P EST MOD 30 MIN: CPT | Performed by: FAMILY MEDICINE

## 2023-10-17 PROCEDURE — 3074F SYST BP LT 130 MM HG: CPT | Performed by: FAMILY MEDICINE

## 2023-10-17 PROCEDURE — 3008F BODY MASS INDEX DOCD: CPT | Performed by: FAMILY MEDICINE

## 2023-10-17 PROCEDURE — 3078F DIAST BP <80 MM HG: CPT | Performed by: FAMILY MEDICINE

## 2023-10-17 NOTE — ASSESSMENT & PLAN NOTE
Contact PCP re: blood pressure medications.    Discussed warning signs of low blood pressures-- lightheaded or dizzy, trouble changing positions or feeling off balance  With change in nutrition and increased physical activity, blood pressure medications may need to be reduced.  I asked patient to monitor home blood pressure every 1-2 weeks.

## 2023-10-17 NOTE — ASSESSMENT & PLAN NOTE
Management of lipedema includes maintaining weight loss, but also is about managing inflammation and doing lymphatic support.      See plan outlined in the assessment for obesity.

## 2023-10-17 NOTE — PATIENT INSTRUCTIONS
Please use this link to access education on obesity as a medical condition, metabolic adaptation, insulin resistance, the basics of treatment, and medications for the management of overweight and obesity.     Each video in this series is 10 minutes or less.      [email or web address]     Prioritize water to move your fluid better.      Continue to do intermittent fasting.   Get your protein where it needs to be to help your metabolism.  Keep track with a food journal.     Continue with yoga.     Magnesium and metamucil.    Yogurt with fruit,  add more fresh vegetables.     Educated on sarcopenia --muscle loss as a result of normal aging, and as a side effect to losing weight due to carrying around less weight (mechanical loss) and hormonal changes resulting in muscle weakness (metabolic loss).     Discussed the importance of dedicated strength training and adequate protein intake especially after age 30 to combat all 3 causes of sarcopenia..    Educated on importance of hitting a certain upper limit of protein daily, specifically at least 30 g at breakfast and dinner and another 30-40 throughout the day in between.    Dedicated strength training should be at least 2 days or more per week.  Strength training means you work a muscle to the point of fatigue.  The most important muscles are those between your hip bones and your collarbones --resistance can be done through weights, resistance bands, body weight training (sit ups/pushups), kettle bells, yoga, or Pilates.

## 2023-10-17 NOTE — PROGRESS NOTES
"HISTORY OF PRESENT ILLNESS        Aurora Cedillo is a 53 y.o. female following up on lifestyle management and weight loss as adjunctive treatment strategies for health condition caused by or worsened by overweight and obesity.     Visit history:  RF: Lipedema, insulin resistance, childhood history, history of bariatric surgery, weight cycling, cortisol (stress), inflammation (allergies, eczema), micro biome, nutrition (processed foods, artificial sweeteners, inadequate water), not hunger eating (emotional, fatigue, refuse not, distracted, sugar cravings), low nonexercise activity, no current dedicated exercise.    Weight related Conditions:  Hypertension, Dyslipidemia, Varicose veins, Lipedema, joint pain, Back pain and History of Bariatric surgery    Medication Trials and Contraindications: Phentermine-already on high-dose Wellbutrin    Nutrition recommendation: 1350 Calorie lipedema plan, 3 meals and 0 snacks in 12 hours or less    CWWP intake: 08/23/22  Weight : 172.2 BF%: 37.2 REE: 1340  Lipedema education-start Vasculera, insulin resistant education, PFF plan given, restart yoga  12/01/22: 168.7: taking Vasculera, doing physical therapy. Start metformin. Possible glp-1 from jazzy  03/06/23: 163:: stop Vasculera, Increase metformin to 1500 mg a day.   07/16/23: 163 (35.4): Continue metformin    Medication(s) used to support lifestyle modifications:Metformin  Taking medications as prescribed:yes  Side effects: none  Supplements used to support lifestyle modification:Diosmin    Interval history:   Chart reviewed since our last visit.    No new labs to review, Reviewed last Registered Dietitian's notes, Has next dietitian visit already scheduled and Last blood work November 2022    Subjective: Frustrated with \"nothing working\" despite 40% body weight loss?  Maintaining negative caloric balance, but not meeting nutritional needs.    Struggling with excessive fatigue .          Current Outpatient Medications on " File Prior to Visit   Medication Sig Dispense Refill    BIOTIN ORAL Take 5,000 mg by mouth daily.      buPROPion XL (WELLBUTRIN XL) 150 mg 24 hr tablet TAKE 1 TABLET DAILY. TAKE WITH 300 MG TABLETS (TOTAL 450 MG) 90 tablet 3    buPROPion XL (WELLBUTRIN XL) 300 mg 24 hr tablet TAKE 1 TABLET DAILY. TAKE WITH 150 MG TABLETS (TOTAL 450 MG) 90 tablet 3    chromium picolinate 200 mcg tablet Take by mouth.      ciprofloxacin (CIPRO) 500 mg tablet Take 1 tablet (500 mg total) by mouth 2 (two) times a day for 7 days. 14 tablet 0    clioquinol-hydrocortisone (ALA-GILLES) 3-0.5 % cream External      crisaborole (EUCRISA) 2 % ointment Eucrisa 2 % topical ointment      famotidine (PEPCID) 40 mg tablet Take 1 tablet (40 mg total) by mouth 2 (two) times a day. 180 tablet 1    fluconazole (DIFLUCAN) 150 mg tablet Take one pill once. If symptoms do not resolve take another pill 48 hours after first pill. 2 tablet 0    hydrochlorothiazide (HYDRODIURIL) 25 mg tablet TAKE 1 TABLET DAILY (Patient taking differently: Take 12.5 mg by mouth daily.) 90 tablet 3    L. acidophilus/pectin, citrus (ACIDOPHILUS PROBIOTIC ORAL) Take 0.5 mg by mouth.      Lactobacillus acidophilus (PROBIOTIC ORAL) Take by mouth daily.      levonorgestreL (LILETTA) 18.6 mcg/24 hrs (6 yrs) 52 mg intrauterine device intrauterine device 1 kit by intrauterine route once.      loratadine (CLARITIN) 10 mg tablet Take 10 mg by mouth daily.      losartan (COZAAR) 50 mg tablet Take 1 tablet (50 mg total) by mouth daily. 90 tablet 3    metFORMIN XR (GLUCOPHAGE-XR) 750 mg 24 hr tablet Take 2 tablets (1,500 mg total) by mouth daily with dinner. 180 tablet 1    rosuvastatin (CRESTOR) 5 mg tablet TAKE 1 TABLET DAILY 90 tablet 3    spironolactone (ALDACTONE) 100 mg tablet TAKE 1 TABLET DAILY 90 tablet 3    VASCULERA 630 mg tablet Take one tablet by mouth daily 90 tablet 0     No current facility-administered medications on file prior to visit.          Neomycin,  "Neomycin-bacitracnzn-polymyxnb, Penicillins, Latex, and Miconazole           No results found for: \"HGBA1C\"  Lab Results   Component Value Date    CHOL 161 11/03/2022    CHOL 157 01/26/2022     Lab Results   Component Value Date    HDL 57 11/03/2022    HDL 58 01/26/2022     Lab Results   Component Value Date    LDLCALC 85 11/03/2022    LDLCALC 79 01/26/2022     Lab Results   Component Value Date    TRIG 102 11/03/2022    TRIG 121 01/26/2022     No results found for: \"CHOLHDL\"  Lab Results   Component Value Date     11/03/2022    K 4.4 11/03/2022    CL 99 11/03/2022    CO2 22 11/03/2022    BUN 11 11/03/2022    CREATININE 0.74 11/03/2022    GLUCOSE 83 11/03/2022    AST 12 11/03/2022    ALT 6 11/03/2022    ALKPHOS 49 11/03/2022    PROTEIN 6.9 11/03/2022    ALBUMIN 4.5 11/03/2022    BILITOT 0.5 11/03/2022    EGFR 97 11/03/2022       PHYSICAL EXAMINATION      Visit Vitals  /60 (BP Location: Left upper arm, Patient Position: Sitting)   Pulse 73   Resp 18   Ht 1.575 m (5' 2\")   Wt 74.4 kg (164 lb)   LMP  (LMP Unknown)   SpO2 99%   BMI 30.00 kg/m²      Body mass index is 30 kg/m².    Physical Exam           ASSESSMENT AND PLAN         Overweight  Please use this link to access education on obesity as a medical condition, metabolic adaptation, insulin resistance, the basics of treatment, and medications for the management of overweight and obesity.     Each video in this series is 10 minutes or less.      [email or web address]     Prioritize water to move your fluid better.  64 ounces a day.     Continue to do intermittent fasting.   Get your protein where it needs to be to help your metabolism.  Keep track with a food journal. 30 grams 3 tiems a day.     Continue with yoga for lymphatic support.       Educated on sarcopenia --muscle loss as a result of normal aging, and as a side effect to losing weight due to carrying around less weight (mechanical loss) and hormonal changes resulting in " muscle weakness (metabolic loss).     Discussed the importance of dedicated strength training and adequate protein intake especially after age 30 to combat all 3 causes of sarcopenia..    Educated on importance of hitting a certain upper limit of protein daily, specifically at least 30 g at breakfast and dinner and another 30-40 throughout the day in between.    Dedicated strength training should be at least 2 days or more per week.  Strength training means you work a muscle to the point of fatigue.  The most important muscles are those between your hip bones and your collarbones --resistance can be done through weights, resistance bands, body weight training (sit ups/pushups), kettle bells, yoga, or Pilates.      I spent 30 minutes on this date of service performing the following activities: obtaining history, preparing for visit, obtaining / reviewing records and providing counseling and education.      Lipedema  Management of lipedema includes maintaining weight loss, but also is about managing inflammation and doing lymphatic support.      See plan outlined in the assessment for obesity.       Essential hypertension, benign  Contact PCP re: blood pressure medications.    Discussed warning signs of low blood pressures-- lightheaded or dizzy, trouble changing positions or feeling off balance  With change in nutrition and increased physical activity, blood pressure medications may need to be reduced.  I asked patient to monitor home blood pressure every 1-2 weeks.             Thank you very much for allowing us to participate in the care of your patient. Please do not hesitate to call or email if there are any questions.

## 2023-10-23 ENCOUNTER — OFFICE VISIT (OUTPATIENT)
Dept: INTERNAL MEDICINE | Facility: CLINIC | Age: 53
End: 2023-10-23
Payer: COMMERCIAL

## 2023-10-23 ENCOUNTER — TELEPHONE (OUTPATIENT)
Dept: INTERNAL MEDICINE | Facility: CLINIC | Age: 53
End: 2023-10-23

## 2023-10-23 VITALS
BODY MASS INDEX: 30 KG/M2 | SYSTOLIC BLOOD PRESSURE: 120 MMHG | DIASTOLIC BLOOD PRESSURE: 84 MMHG | HEART RATE: 75 BPM | TEMPERATURE: 98.7 F | OXYGEN SATURATION: 98 % | HEIGHT: 62 IN | WEIGHT: 163 LBS

## 2023-10-23 DIAGNOSIS — J06.9 ACUTE URI: ICD-10-CM

## 2023-10-23 PROCEDURE — 3074F SYST BP LT 130 MM HG: CPT | Performed by: NURSE PRACTITIONER

## 2023-10-23 PROCEDURE — 3008F BODY MASS INDEX DOCD: CPT | Performed by: NURSE PRACTITIONER

## 2023-10-23 PROCEDURE — 99213 OFFICE O/P EST LOW 20 MIN: CPT | Performed by: NURSE PRACTITIONER

## 2023-10-23 PROCEDURE — 3079F DIAST BP 80-89 MM HG: CPT | Performed by: NURSE PRACTITIONER

## 2023-10-23 RX ORDER — DOXYCYCLINE HYCLATE 100 MG
100 TABLET ORAL 2 TIMES DAILY
Qty: 14 TABLET | Refills: 0 | Status: SHIPPED | OUTPATIENT
Start: 2023-10-23 | End: 2023-11-02

## 2023-10-23 ASSESSMENT — ENCOUNTER SYMPTOMS
ABDOMINAL PAIN: 0
EYE ITCHING: 0
HEADACHES: 1
DIARRHEA: 0
EYE PAIN: 0
COUGH: 1
FEVER: 0
ABDOMINAL DISTENTION: 0
CHILLS: 0
PALPITATIONS: 0
EYE REDNESS: 0
EYE DISCHARGE: 0
SORE THROAT: 1
SINUS PRESSURE: 1
VOMITING: 0
NAUSEA: 0
RHINORRHEA: 1
SINUS PAIN: 1
SHORTNESS OF BREATH: 0
WHEEZING: 0
FATIGUE: 1

## 2023-10-23 NOTE — TELEPHONE ENCOUNTER
Patient called and is till having cough, nasal congestion, fatigue and sinus congestion. Nasal d/c is yellow even after taking antibiotic for 7 days. She stated she may feel a little better.

## 2023-10-23 NOTE — PROGRESS NOTES
Internal Medicine Note       Reason for visit: Cough and Green Mucos/Nose       HPI   Aurora Cedillo is a 53 y.o. female who presents with continued cough and green nasal d/c. Cough for green phlegm mix of thin and thick. Finished Cipro with only some relief.. States she is taking 1/2 Hctz due to low bp readings.  good bp readings at home on this dose. .         Past Medical History:   Diagnosis Date    Allergic rhinitis     Anxiety 5 years ago    Arthritis     Back pain     BMI 40.0-44.9, adult (CMS/[facility])     Cellulitis     COVID-19 virus infection 9/19/2022    Depression 06/28/2012    Dysfunction of both eustachian tubes     Essential hypertension, benign 06/28/2012    GERD (gastroesophageal reflux disease) After lap band    Gout 15-20 years ago    No longer have    High cholesterol 20 years ago    Hyperlipidemia     Irregular menses     Plantar fasciitis 20 years ago    Right bundle branch block 06/28/2012    Sensorineural hearing loss, bilateral     Shingles     on head, age 20's    Sinusitis     Skin abnormalities All my life    Several skin issues some related to allergies, stress    Skin rash     Sleep apnea     Snoring     UTI (urinary tract infection)     Varicose veins of lower extremity     Vitamin D deficiency     Yeast infection      Past Surgical History:   Procedure Laterality Date    CARPAL TUNNEL RELEASE  25 years ago    Corrected with surgery    COLONOSCOPY      ENDOMETRIAL BIOPSY  2022    KNEE ARTHROSCOPY  15 years ago    KNEE SURGERY  7 years ago    LAPAROSCOPIC GASTRIC BANDING  15 years ago?    Unsure of exact timeframe    PARTIAL KNEE ARTHROPLASTY Left     SINUS SURGERY      VAGINAL DELIVERY       Social History     Social History Narrative    Children- 2 sons (16, 10), 1 daughter (13). 9/2021    Caffeine - coffee 1 daily. Iced tea, unsweetened    Exercise- yoga     Diet- intermittent fasting    Pets- dog    Judaism affiliation-     Lives with spouse  and three children     Family History   Problem Relation Age of Onset    Atrial fibrillation Biological Mother     Anxiety disorder Biological Mother     Atrial fibrillation Biological Father     Stroke Biological Father         with cognitive impairment    Heart disease Biological Father     Hypertension Biological Father     Mental illness Biological Father     Obesity Biological Father     Atrial fibrillation Biological Brother     Stroke Biological Brother          of CVA age 52    ADD / ADHD Biological Daughter     Anxiety disorder Biological Daughter     No Known Problems Biological Son     Autism spectrum disorder Biological Son         high functioning    ADD / ADHD Biological Son         oldest, declines meds     Neomycin, Neomycin-bacitracnzn-polymyxnb, Penicillins, Latex, and Miconazole  Current Outpatient Medications   Medication Sig Dispense Refill    doxycycline hyclate (VIBRA-TABS) 100 mg tablet Take 1 tablet (100 mg total) by mouth 2 (two) times a day for 10 days. 14 tablet 0    BIOTIN ORAL Take 5,000 mg by mouth daily.      buPROPion XL (WELLBUTRIN XL) 150 mg 24 hr tablet TAKE 1 TABLET DAILY. TAKE WITH 300 MG TABLETS (TOTAL 450 MG) 90 tablet 3    buPROPion XL (WELLBUTRIN XL) 300 mg 24 hr tablet TAKE 1 TABLET DAILY. TAKE WITH 150 MG TABLETS (TOTAL 450 MG) 90 tablet 3    chromium picolinate 200 mcg tablet Take by mouth.      clioquinol-hydrocortisone (ALA-GILLES) 3-0.5 % cream External      crisaborole (EUCRISA) 2 % ointment Eucrisa 2 % topical ointment      famotidine (PEPCID) 40 mg tablet Take 1 tablet (40 mg total) by mouth 2 (two) times a day. 180 tablet 1    fluconazole (DIFLUCAN) 150 mg tablet Take one pill once. If symptoms do not resolve take another pill 48 hours after first pill. 2 tablet 0    hydrochlorothiazide (HYDRODIURIL) 25 mg tablet TAKE 1 TABLET DAILY (Patient taking differently: Take 12.5 mg by mouth daily.) 90 tablet 3    L. acidophilus/pectin, citrus  (ACIDOPHILUS PROBIOTIC ORAL) Take 0.5 mg by mouth.      Lactobacillus acidophilus (PROBIOTIC ORAL) Take by mouth daily.      levonorgestreL (LILETTA) 18.6 mcg/24 hrs (6 yrs) 52 mg intrauterine device intrauterine device 1 kit by intrauterine route once.      loratadine (CLARITIN) 10 mg tablet Take 10 mg by mouth daily.      losartan (COZAAR) 50 mg tablet Take 1 tablet (50 mg total) by mouth daily. (Patient taking differently: Take 50 mg by mouth daily. Pt taking 1/2 Tablet, okd by HC 10/23/23) 90 tablet 3    metFORMIN XR (GLUCOPHAGE-XR) 750 mg 24 hr tablet Take 2 tablets (1,500 mg total) by mouth daily with dinner. 180 tablet 1    rosuvastatin (CRESTOR) 5 mg tablet TAKE 1 TABLET DAILY 90 tablet 3    spironolactone (ALDACTONE) 100 mg tablet TAKE 1 TABLET DAILY 90 tablet 3    VASCULERA 630 mg tablet Take one tablet by mouth daily 90 tablet 0     No current facility-administered medications for this visit.       Review of Systems   Constitutional: Positive for fatigue. Negative for chills and fever.   HENT: Positive for congestion, ear pain, postnasal drip, rhinorrhea, sinus pressure, sinus pain and sore throat.    Eyes: Negative for pain, discharge, redness and itching.        Tearing   Respiratory: Positive for cough. Negative for shortness of breath and wheezing.    Cardiovascular: Negative for chest pain and palpitations.   Gastrointestinal: Negative for abdominal distention, abdominal pain, diarrhea, nausea and vomiting.   Neurological: Positive for headaches.       Objective   Vitals:    10/23/23 1428   BP: 120/84   Pulse: 75   Temp: 37.1 °C (98.7 °F)   SpO2: 98%       Physical Exam  Constitutional:       Appearance: Normal appearance.   HENT:      Right Ear: Tympanic membrane and ear canal normal.      Left Ear: Tympanic membrane and ear canal normal.      Mouth/Throat:      Mouth: Mucous membranes are moist.      Pharynx: Posterior oropharyngeal erythema present. No oropharyngeal exudate.   Eyes:       Conjunctiva/sclera: Conjunctivae normal.   Cardiovascular:      Rate and Rhythm: Normal rate and regular rhythm.      Heart sounds: Normal heart sounds.   Pulmonary:      Effort: Pulmonary effort is normal.   Abdominal:      General: Bowel sounds are normal. There is no distension.      Palpations: Abdomen is soft.      Tenderness: There is no abdominal tenderness.   Musculoskeletal:      Cervical back: Neck supple.   Skin:     General: Skin is warm and dry.   Neurological:      Mental Status: She is alert.         Lab Results   Component Value Date    WBC 6.9 11/03/2022    HGB 13.9 11/03/2022     11/03/2022    CHOL 161 11/03/2022    TRIG 102 11/03/2022    HDL 57 11/03/2022    ALT 6 11/03/2022    AST 12 11/03/2022     11/03/2022    K 4.4 11/03/2022    CREATININE 0.74 11/03/2022    TSH 2.300 11/03/2022          Current Outpatient Medications:     doxycycline hyclate (VIBRA-TABS) 100 mg tablet, Take 1 tablet (100 mg total) by mouth 2 (two) times a day for 10 days., Disp: 14 tablet, Rfl: 0    BIOTIN ORAL, Take 5,000 mg by mouth daily., Disp: , Rfl:     buPROPion XL (WELLBUTRIN XL) 150 mg 24 hr tablet, TAKE 1 TABLET DAILY. TAKE WITH 300 MG TABLETS (TOTAL 450 MG), Disp: 90 tablet, Rfl: 3    buPROPion XL (WELLBUTRIN XL) 300 mg 24 hr tablet, TAKE 1 TABLET DAILY. TAKE WITH 150 MG TABLETS (TOTAL 450 MG), Disp: 90 tablet, Rfl: 3    chromium picolinate 200 mcg tablet, Take by mouth., Disp: , Rfl:     clioquinol-hydrocortisone (ALA-GILLES) 3-0.5 % cream, External, Disp: , Rfl:     crisaborole (EUCRISA) 2 % ointment, Eucrisa 2 % topical ointment, Disp: , Rfl:     famotidine (PEPCID) 40 mg tablet, Take 1 tablet (40 mg total) by mouth 2 (two) times a day., Disp: 180 tablet, Rfl: 1    fluconazole (DIFLUCAN) 150 mg tablet, Take one pill once. If symptoms do not resolve take another pill 48 hours after first pill., Disp: 2 tablet, Rfl: 0    hydrochlorothiazide (HYDRODIURIL) 25 mg tablet, TAKE 1 TABLET DAILY  (Patient taking differently: Take 12.5 mg by mouth daily.), Disp: 90 tablet, Rfl: 3    L. acidophilus/pectin, citrus (ACIDOPHILUS PROBIOTIC ORAL), Take 0.5 mg by mouth., Disp: , Rfl:     Lactobacillus acidophilus (PROBIOTIC ORAL), Take by mouth daily., Disp: , Rfl:     levonorgestreL (LILETTA) 18.6 mcg/24 hrs (6 yrs) 52 mg intrauterine device intrauterine device, 1 kit by intrauterine route once., Disp: , Rfl:     loratadine (CLARITIN) 10 mg tablet, Take 10 mg by mouth daily., Disp: , Rfl:     losartan (COZAAR) 50 mg tablet, Take 1 tablet (50 mg total) by mouth daily. (Patient taking differently: Take 50 mg by mouth daily. Pt taking 1/2 Tablet, okd by  10/23/23), Disp: 90 tablet, Rfl: 3    metFORMIN XR (GLUCOPHAGE-XR) 750 mg 24 hr tablet, Take 2 tablets (1,500 mg total) by mouth daily with dinner., Disp: 180 tablet, Rfl: 1    rosuvastatin (CRESTOR) 5 mg tablet, TAKE 1 TABLET DAILY, Disp: 90 tablet, Rfl: 3    spironolactone (ALDACTONE) 100 mg tablet, TAKE 1 TABLET DAILY, Disp: 90 tablet, Rfl: 3    VASCULERA 630 mg tablet, Take one tablet by mouth daily, Disp: 90 tablet, Rfl: 0      Assessment   Diagnoses and all orders for this visit:    Acute URI  Assessment & Plan:  With minimal resolution from last visit. Pt will start doxycycline 100 mg 1 p.o. twice daily.  She was encouraged to take Mucinex 600 mg twice a day.  Increase fluids, rest, Tylenol and Advil as needed.  She will return to the office in 7 to 10 days if symptoms are not resolving.      Other orders  -     doxycycline hyclate (VIBRA-TABS) 100 mg tablet; Take 1 tablet (100 mg total) by mouth 2 (two) times a day for 10 days.          ISRRAEL Mcdaniel  10/23/2023  2:39 PM

## 2023-10-23 NOTE — ASSESSMENT & PLAN NOTE
With minimal resolution from last visit. Pt will start doxycycline 100 mg 1 p.o. twice daily.  She was encouraged to take Mucinex 600 mg twice a day.  Increase fluids, rest, Tylenol and Advil as needed.  She will return to the office in 7 to 10 days if symptoms are not resolving.

## 2023-10-27 ENCOUNTER — TELEPHONE (OUTPATIENT)
Dept: INTERNAL MEDICINE | Facility: CLINIC | Age: 53
End: 2023-10-27
Payer: COMMERCIAL

## 2023-10-27 RX ORDER — DOXYCYCLINE HYCLATE 100 MG
100 TABLET ORAL 2 TIMES DAILY
Qty: 14 TABLET | Refills: 0 | OUTPATIENT
Start: 2023-10-27 | End: 2023-11-06

## 2023-10-27 NOTE — TELEPHONE ENCOUNTER
[facility] needs clarification.  Rx was ordered for 14 tablets taking 2 daily for 10 days.      Please send re-send Rx (already corrected) to [facility] in [facility].

## 2023-10-27 NOTE — TELEPHONE ENCOUNTER
Pharmacy called. They received a prescription for doxycycline hyclate (VIBRA-TABS) 100 mg tablet. The directions states Take 1 tablet (100 mg total) by mouth 2 (two) times a day for 10 days and only 14 pills. They would like to know if they should disperse 6 additional pills? Pharmacy phone # is [phone]

## 2023-10-30 RX ORDER — DOXYCYCLINE HYCLATE 100 MG
100 TABLET ORAL 2 TIMES DAILY
Qty: 20 TABLET | Refills: 0 | Status: CANCELLED | OUTPATIENT
Start: 2023-10-30 | End: 2023-11-09

## 2024-01-12 ENCOUNTER — TELEMEDICINE (OUTPATIENT)
Dept: INTERNAL MEDICINE | Facility: CLINIC | Age: 54
End: 2024-01-12
Payer: COMMERCIAL

## 2024-01-12 DIAGNOSIS — J06.9 VIRAL UPPER RESPIRATORY TRACT INFECTION: Primary | ICD-10-CM

## 2024-01-12 PROCEDURE — 99214 OFFICE O/P EST MOD 30 MIN: CPT | Mod: 95 | Performed by: NURSE PRACTITIONER

## 2024-01-12 ASSESSMENT — ENCOUNTER SYMPTOMS
MYALGIAS: 0
CHOKING: 0
SHORTNESS OF BREATH: 0
FEVER: 0
WHEEZING: 0
SINUS PRESSURE: 0
CHEST TIGHTNESS: 0
VOICE CHANGE: 1
COUGH: 1
DIARRHEA: 0
VOMITING: 0
NAUSEA: 0
SORE THROAT: 0
RHINORRHEA: 0
SINUS PAIN: 0
CHILLS: 1
FATIGUE: 1

## 2024-01-12 NOTE — PROGRESS NOTES
Verification of Patient Location:  The patient affirms they are currently located in the following state: [place]    Request for Consent:    Audio and Video Encounter   Avinash, my name is ISRRAEL Powers.  Before we proceed, can you please verify your identification by telling me your full name and date of birth?  Can you tell me who is in the room with you?    You and I are about to have a telemedicine check-in or visit because you have requested it.  This is a live video-conference.  I am a real person, speaking to you in real time.  There is no one else with me on the video-conference. I am not recording this conversation and I am asking you not to record it.  This telemedicine visit will be billed to your health insurance or you, if you are self-insured.  You understand you will be responsible for any copayments or coinsurances that apply to your telemedicine visit.  Communication platform used for this encounter:  [employer] Video Visit (Epic Video Client)       Before starting our telemedicine visit, I am required to get your consent for this virtual check-in or visit by telemedicine. Do you consent?      Patient Response to Request for Consent:  Yes      Visit Documentation:  Subjective     Patient ID: JORDAN Cedillo is a 53 y.o. female.  1970      Aurora May joins video virtual visit. New patient to me. On Christmas Cierra son became sick and he tested positive Influenza A. She became sick on 12/26 and went to  on 12/29. States she tested negative for FLU. States she had sinus and ear pain with runny nose. Had teeth pain and yellow/green mucous. Had a sore throat with sneezing. Was getting nausea from mucous. Diagnosed with sinus infection and prescribed Augmentin and steroids. Was also taking OTC medications and saline rinses. Started antibiotics on 12/29 and 12/30 started feeling better with sinus pain. Finished the Augmentin on 1/9. States she is still having congestion, chills/sweats  and sinus  congestion. Went to ENT week of New Year Day and he states sinus infection resolved. States he said still having congestion and not to take antihistamines. Tried not taking Zyrtec and allergies became worse. Called ENT yesterday and states having sweats and chills most likely viral. States cough could be from Acid reflux. Last night started Pepcid AC and cough has improved significantly. Denies any other symptoms.       The following have been reviewed and updated as appropriate in this visit:   Allergies  Meds  Problems       Review of Systems   Constitutional: Positive for chills and fatigue. Negative for fever.   HENT: Positive for congestion and voice change. Negative for rhinorrhea, sinus pressure, sinus pain, sneezing and sore throat.    Respiratory: Positive for cough. Negative for choking, chest tightness, shortness of breath and wheezing.    Gastrointestinal: Negative for diarrhea, nausea and vomiting.   Musculoskeletal: Negative for myalgias.         Assessment/Plan      Patient well appearing on video. Nasal congestion present on video. Cough not present on video. No acute distress. No SOB. Reviewed viral symptoms and reflux with patient in depth. Advised to treat symptoms with OTC medications and suppurative care. Follow up if needed.     Diagnoses and all orders for this visit:    Viral upper respiratory tract infection (Primary)  Assessment & Plan:  Reviewed cough and URI diagnosis with patient. Advised patient if symptoms do not get better or become worse to follow up.Pt agreed and verbalized understanding with plan of care. Will follow up if needed. Reviewed viral diagnosis and the use of antibiotics with bacterial infections with patient.     -Take OTC medications as discussed for symptoms.   -increase fluids and electrolytes  -humidifier  -honey cough drops for cough if diabetic use sugar free  -Mucinex as directed for expectorant but note cough can become worse due to expectorant.   -Sleep  30-45 degree angle for post nasal drip and cough  -Robitussin at night for cough not to interrupt sleeping    Can continue taking antacids as they have helped with symptoms. Monitor diet.           Time Spent:  I spent 37 minutes on this date of service performing the following activities: obtaining history, documenting, preparing for visit and providing counseling and education.

## 2024-01-12 NOTE — ASSESSMENT & PLAN NOTE
Reviewed cough and URI diagnosis with patient. Advised patient if symptoms do not get better or become worse to follow up.Pt agreed and verbalized understanding with plan of care. Will follow up if needed. Reviewed viral diagnosis and the use of antibiotics with bacterial infections with patient.     -Take OTC medications as discussed for symptoms.   -increase fluids and electrolytes  -humidifier  -honey cough drops for cough if diabetic use sugar free  -Mucinex as directed for expectorant but note cough can become worse due to expectorant.   -Sleep 30-45 degree angle for post nasal drip and cough  -Robitussin at night for cough not to interrupt sleeping    Can continue taking antacids as they have helped with symptoms. Monitor diet.

## 2024-02-15 ENCOUNTER — APPOINTMENT (RX ONLY)
Dept: URBAN - METROPOLITAN AREA CLINIC 23 | Facility: CLINIC | Age: 54
Setting detail: DERMATOLOGY
End: 2024-02-15

## 2024-02-15 DIAGNOSIS — B00.1 HERPESVIRAL VESICULAR DERMATITIS: ICD-10-CM

## 2024-02-15 DIAGNOSIS — L20.89 OTHER ATOPIC DERMATITIS: ICD-10-CM | Status: INADEQUATELY CONTROLLED

## 2024-02-15 DIAGNOSIS — D22 MELANOCYTIC NEVI: ICD-10-CM

## 2024-02-15 DIAGNOSIS — L65.8 OTHER SPECIFIED NONSCARRING HAIR LOSS: ICD-10-CM

## 2024-02-15 PROBLEM — D22.62 MELANOCYTIC NEVI OF LEFT UPPER LIMB, INCLUDING SHOULDER: Status: ACTIVE | Noted: 2024-02-15

## 2024-02-15 PROCEDURE — 99214 OFFICE O/P EST MOD 30 MIN: CPT | Mod: 25

## 2024-02-15 PROCEDURE — ? ADDITIONAL NOTES

## 2024-02-15 PROCEDURE — ? SHAVE REMOVAL

## 2024-02-15 PROCEDURE — 11301 SHAVE SKIN LESION 0.6-1.0 CM: CPT

## 2024-02-15 PROCEDURE — ? OTC TREATMENT REGIMEN

## 2024-02-15 PROCEDURE — ? PRESCRIPTION

## 2024-02-15 PROCEDURE — ? PRESCRIPTION MEDICATION MANAGEMENT

## 2024-02-15 PROCEDURE — ? COUNSELING

## 2024-02-15 RX ORDER — CRISABOROLE 20 MG/G
OINTMENT TOPICAL BID
Qty: 60 | Refills: 3 | Status: ERX | COMMUNITY
Start: 2024-02-15

## 2024-02-15 RX ORDER — TRIAMCINOLONE ACETONIDE 1 MG/G
CREAM TOPICAL BID
Qty: 80 | Refills: 3 | Status: ERX | COMMUNITY
Start: 2024-02-15

## 2024-02-15 RX ADMIN — TRIAMCINOLONE ACETONIDE: 1 CREAM TOPICAL at 00:00

## 2024-02-15 RX ADMIN — CRISABOROLE: 20 OINTMENT TOPICAL at 00:00

## 2024-02-15 ASSESSMENT — LOCATION SIMPLE DESCRIPTION DERM
LOCATION SIMPLE: SCALP
LOCATION SIMPLE: LEFT SHOULDER
LOCATION SIMPLE: RIGHT HAND
LOCATION SIMPLE: RIGHT LIP
LOCATION SIMPLE: LEFT HAND

## 2024-02-15 ASSESSMENT — LOCATION DETAILED DESCRIPTION DERM
LOCATION DETAILED: LEFT SUPERIOR PARIETAL SCALP
LOCATION DETAILED: LEFT RADIAL DORSAL HAND
LOCATION DETAILED: RIGHT INFERIOR VERMILION LIP
LOCATION DETAILED: LEFT ANTERIOR SHOULDER
LOCATION DETAILED: RIGHT RADIAL DORSAL HAND

## 2024-02-15 ASSESSMENT — LOCATION ZONE DERM
LOCATION ZONE: LIP
LOCATION ZONE: ARM
LOCATION ZONE: HAND
LOCATION ZONE: SCALP

## 2024-02-15 NOTE — PROCEDURE: ADDITIONAL NOTES
Detail Level: Zone
Additional Notes: Patient very against using topical rogaine at this time.\\nDiscussed discontinuing Biotin supplements. Recommended beginning to use nutrafol.
Render Risk Assessment In Note?: yes

## 2024-02-15 NOTE — PROCEDURE: SHAVE REMOVAL
Medical Necessity Information: It is in your best interest to select a reason for this procedure from the list below. All of these items fulfill various CMS LCD requirements except the new and changing color options.
Medical Necessity Clause: This procedure was medically necessary because the lesion that was treated was:
Lab: -19
Lab Facility: 3
Detail Level: Detailed
Was A Bandage Applied: Yes
Size Of Lesion In Cm (Required): 0.6
X Size Of Lesion In Cm (Optional): 0
Depth Of Shave: dermis
Biopsy Method: Personna blade
Anesthesia Type: 1% lidocaine without epinephrine
Hemostasis: Aluminum Chloride
Wound Care: Petrolatum
Render Path Notes In Note?: No
Consent was obtained from the patient. The risks and benefits to therapy were discussed in detail. Specifically, the risks of infection, scarring, bleeding, prolonged wound healing, incomplete removal, allergy to anesthesia, nerve injury and recurrence were addressed. Prior to the procedure, the treatment site was clearly identified and confirmed by the patient. All components of Universal Protocol/PAUSE Rule completed.
Post-Care Instructions: I reviewed with the patient in detail post-care instructions. Patient is to keep the biopsy site dry overnight, and then apply bacitracin twice daily until healed. Patient may apply hydrogen peroxide soaks to remove any crusting.
Notification Instructions: Patient will be notified of pathology results. However, patient instructed to call the office if not contacted within 2 weeks.
Billing Type: Third-Party Bill

## 2024-02-15 NOTE — PROCEDURE: PRESCRIPTION MEDICATION MANAGEMENT
Initiate Treatment: Eucrisa 2 % topical ointment BID, apply a thin layer to eczema of the hands BID PRN flares\\n\\ntriamcinolone acetonide 0.1 % topical cream BID, apply twice daily to affected areas of hands twice daily for up to 2 weeks/ month. Do not use more than 2 weeks per month
Render In Strict Bullet Format?: No
Detail Level: Zone

## 2024-02-15 NOTE — HPI: SKIN LESION
What Type Of Note Output Would You Prefer (Optional)?: Bullet Format
Is This A New Presentation, Or A Follow-Up?: Skin Lesions
Which Family Member (Optional)?: Mother, father
Additional History: Pt is concerned about a spot on her left shoulder and a cold sore on her right lower lip.

## 2024-02-23 RX ORDER — CRISABOROLE 20 MG/G
OINTMENT TOPICAL BID
Qty: 60 | Refills: 3 | Status: ERX

## 2024-02-27 ENCOUNTER — OFFICE VISIT (OUTPATIENT)
Dept: INTERNAL MEDICINE | Facility: CLINIC | Age: 54
End: 2024-02-27
Payer: COMMERCIAL

## 2024-02-27 VITALS
RESPIRATION RATE: 17 BRPM | SYSTOLIC BLOOD PRESSURE: 122 MMHG | HEART RATE: 82 BPM | TEMPERATURE: 97.9 F | OXYGEN SATURATION: 98 % | BODY MASS INDEX: 30.73 KG/M2 | DIASTOLIC BLOOD PRESSURE: 82 MMHG | HEIGHT: 62 IN | WEIGHT: 167 LBS

## 2024-02-27 DIAGNOSIS — M25.551 BILATERAL HIP PAIN: Primary | ICD-10-CM

## 2024-02-27 DIAGNOSIS — Z01.89 ROUTINE LAB DRAW: ICD-10-CM

## 2024-02-27 DIAGNOSIS — M25.552 BILATERAL HIP PAIN: Primary | ICD-10-CM

## 2024-02-27 PROBLEM — M76.30 ILIOTIBIAL BAND SYNDROME: Status: ACTIVE | Noted: 2024-02-27

## 2024-02-27 PROCEDURE — 3079F DIAST BP 80-89 MM HG: CPT | Performed by: INTERNAL MEDICINE

## 2024-02-27 PROCEDURE — 3008F BODY MASS INDEX DOCD: CPT | Performed by: INTERNAL MEDICINE

## 2024-02-27 PROCEDURE — 3074F SYST BP LT 130 MM HG: CPT | Performed by: INTERNAL MEDICINE

## 2024-02-27 PROCEDURE — 99214 OFFICE O/P EST MOD 30 MIN: CPT | Performed by: INTERNAL MEDICINE

## 2024-02-27 RX ORDER — METHYLPREDNISOLONE 4 MG/1
TABLET ORAL
Qty: 21 TABLET | Refills: 0 | Status: SHIPPED | OUTPATIENT
Start: 2024-02-27 | End: 2024-03-07

## 2024-02-27 ASSESSMENT — ENCOUNTER SYMPTOMS
ARTHRALGIAS: 1
NUMBNESS: 0
MYALGIAS: 0
ACTIVITY CHANGE: 1
JOINT SWELLING: 0
WEAKNESS: 0
SLEEP DISTURBANCE: 0
NECK PAIN: 0

## 2024-02-27 NOTE — ASSESSMENT & PLAN NOTE
Right worse than left.  Exam consistent with bursitis.  Advised to take diclofenac twice a day for the next 5 to 7 days.  If no significant improvement in the next 3 days, advised to switch to the Medrol Dosepak.  Advised to rest and avoid too much activity. Advised to also discuss possible benefit of injection when she sees her orthopedic doctor later this week

## 2024-02-27 NOTE — PROGRESS NOTES
Subjective      Patient ID: Aurora Cedillo is a 53 y.o. female.    HPI      Patient presents with c/o right leg pain in the outer hip and down the outer thigh area.  Started about 2 weeks ago.  No obvious injury or strain. Noticed first when getting out of bed one day. Has similar symptoms on the left side but no pain unless she pushes on it.  Having a hard time going up the steps.  Certain poses in yoga makes it worse. Has chronic low back pain.  Sees [facility] and has an appt on Thursday or Friday.  Took diclofenac in the evenings a few times over the last couple weeks.  Concerned about IT band syndrome or bursitis.  No knee pain.  No other new/acute concerns.      The following have been reviewed and updated as appropriate in this visit:     Allergies  Meds  Problems         Past Medical History:   Diagnosis Date   • Allergic rhinitis    • Anxiety 5 years ago   • Arthritis    • Back pain    • BMI 40.0-44.9, adult (CMS/[facility])    • Cellulitis    • COVID-19 virus infection 9/19/2022   • Depression 06/28/2012   • Dysfunction of both eustachian tubes    • Essential hypertension, benign 06/28/2012   • GERD (gastroesophageal reflux disease) After lap band   • Gout 15-20 years ago    No longer have   • High cholesterol 20 years ago   • Hyperlipidemia    • Irregular menses    • Plantar fasciitis 20 years ago   • Right bundle branch block 06/28/2012   • Sensorineural hearing loss, bilateral    • Shingles     on head, age 20's   • Sinusitis    • Skin abnormalities All my life    Several skin issues some related to allergies, stress   • Skin rash    • Sleep apnea    • Snoring    • UTI (urinary tract infection)    • Varicose veins of lower extremity    • Vitamin D deficiency    • Yeast infection      Past Surgical History:   Procedure Laterality Date   • CARPAL TUNNEL RELEASE  25 years ago    Corrected with surgery   • COLONOSCOPY     • ENDOMETRIAL BIOPSY  2022   • KNEE ARTHROSCOPY  15 years ago   • KNEE SURGERY   7 years ago   • LAPAROSCOPIC GASTRIC BANDING  15 years ago?    Unsure of exact timeframe   • PARTIAL KNEE ARTHROPLASTY Left    • SINUS SURGERY     • VAGINAL DELIVERY       Family History   Problem Relation Age of Onset   • Atrial fibrillation Biological Mother    • Anxiety disorder Biological Mother    • Atrial fibrillation Biological Father    • Stroke Biological Father         with cognitive impairment   • Heart disease Biological Father    • Hypertension Biological Father    • Mental illness Biological Father    • Obesity Biological Father    • Atrial fibrillation Biological Brother    • Stroke Biological Brother          of CVA age 52   • ADD / ADHD Biological Daughter    • Anxiety disorder Biological Daughter    • No Known Problems Biological Son    • Autism spectrum disorder Biological Son         high functioning   • ADD / ADHD Biological Son         oldest, declines meds     Social History     Socioeconomic History   • Marital status:      Spouse name: None   • Number of children: 3   • Years of education: None   • Highest education level: None   Occupational History   • Occupation: [employer]   Tobacco Use   • Smoking status: Former     Packs/day: 0.50     Years: 5.00     Additional pack years: 0.00     Total pack years: 2.50     Types: Cigarettes     Start date: 1988     Quit date: 1993     Years since quittin.1   • Smokeless tobacco: Never   Vaping Use   • Vaping Use: Never used   Substance and Sexual Activity   • Alcohol use: Yes     Comment: once in a while   • Drug use: Not Currently   • Sexual activity: Yes     Partners: Male     Birth control/protection: Abstinence, Coitus interruptus/Pulling Out, Condom Male     Comment: Unable to take pill due to side effects.   Social History Narrative    Children- 2 sons (16, 10), 1 daughter (13). 2021    Caffeine - coffee 1 daily. Iced tea, unsweetened    Exercise- yoga     Diet- intermittent fasting    Pets- dog    Hinduism affiliation-      Lives with spouse and three children       Review of Systems   Constitutional: Positive for activity change (due to pain).   Musculoskeletal: Positive for arthralgias and gait problem. Negative for joint swelling, myalgias and neck pain.   Neurological: Negative for weakness and numbness.   Psychiatric/Behavioral: Negative for sleep disturbance.       Allergies   Allergen Reactions   • Neomycin Hives   • Neomycin-Bacitracnzn-Polymyxnb Itching   • Penicillins    • Latex Rash   • Miconazole Rash     Current Outpatient Medications   Medication Sig Dispense Refill   • BIOTIN ORAL Take 5,000 mg by mouth daily.     • buPROPion XL (WELLBUTRIN XL) 150 mg 24 hr tablet TAKE 1 TABLET DAILY. TAKE WITH 300 MG TABLETS (TOTAL 450 MG) 90 tablet 3   • buPROPion XL (WELLBUTRIN XL) 300 mg 24 hr tablet TAKE 1 TABLET DAILY. TAKE WITH 150 MG TABLETS (TOTAL 450 MG) 90 tablet 3   • chromium picolinate 200 mcg tablet Take by mouth.     • clioquinol-hydrocortisone (ALA-GILLES) 3-0.5 % cream External     • crisaborole (EUCRISA) 2 % ointment Eucrisa 2 % topical ointment     • famotidine (PEPCID) 40 mg tablet Take 1 tablet (40 mg total) by mouth 2 (two) times a day. 180 tablet 1   • fluconazole (DIFLUCAN) 150 mg tablet Take one pill once. If symptoms do not resolve take another pill 48 hours after first pill. 2 tablet 0   • hydrochlorothiazide (HYDRODIURIL) 25 mg tablet TAKE 1 TABLET DAILY (Patient taking differently: Take 12.5 mg by mouth daily.) 90 tablet 3   • L. acidophilus/pectin, citrus (ACIDOPHILUS PROBIOTIC ORAL) Take 0.5 mg by mouth.     • Lactobacillus acidophilus (PROBIOTIC ORAL) Take by mouth daily.     • levonorgestreL (LILETTA) 18.6 mcg/24 hrs (6 yrs) 52 mg intrauterine device intrauterine device 1 kit by intrauterine route once.     • metFORMIN XR (GLUCOPHAGE-XR) 750 mg 24 hr tablet Take 2 tablets (1,500 mg total) by mouth daily with dinner. 180 tablet 1   • methylPREDNISolone (MEDROL DOSEPACK) 4 mg tablet Follow package  "directions. 21 tablet 0   • rosuvastatin (CRESTOR) 5 mg tablet TAKE 1 TABLET DAILY 90 tablet 3   • spironolactone (ALDACTONE) 100 mg tablet TAKE 1 TABLET DAILY 90 tablet 3   • loratadine (CLARITIN) 10 mg tablet Take 10 mg by mouth daily.       No current facility-administered medications for this visit.       Objective   Vitals:    02/27/24 1111   BP: 122/82   Pulse: 82   Resp: 17   Temp: 36.6 °C (97.9 °F)   SpO2: 98%   Weight: 75.8 kg (167 lb)   Height: 1.575 m (5' 2\")     Body mass index is 30.54 kg/m².    Physical Exam  Constitutional:       General: She is not in acute distress.     Appearance: She is well-developed.   HENT:      Head: Normocephalic and atraumatic.   Pulmonary:      Effort: Pulmonary effort is normal.   Musculoskeletal:         General: Tenderness (Right outer/greater trochanteric area) present. No swelling or deformity. Normal range of motion.   Skin:     General: Skin is warm and dry.   Neurological:      General: No focal deficit present.      Mental Status: She is alert and oriented to person, place, and time.      Gait: Gait abnormal (Antalgic).   Psychiatric:         Thought Content: Thought content normal.         Judgment: Judgment normal.         Assessment/Plan   Problem List Items Addressed This Visit        Musculoskeletal    Bilateral hip pain - Primary     Right worse than left.  Exam consistent with bursitis.  Advised to take diclofenac twice a day for the next 5 to 7 days.  If no significant improvement in the next 3 days, advised to switch to the Medrol Dosepak.  Advised to rest and avoid too much activity. Advised to also discuss possible benefit of injection when she sees her orthopedic doctor later this week         Relevant Orders    Ambulatory referral to Physical Therapy       Other    Routine lab draw    Relevant Orders    CBC and Differential    Comprehensive metabolic panel    Hepatitis C antibody    Lipid panel    TSH w reflex FT4       Caren Guardado, " MD    2/27/2024

## 2024-02-27 NOTE — PATIENT INSTRUCTIONS
Diclofenac twice a day for 5-7 days  Or  Naproxen 440mg twice a day    If no changes in 3 days, start the steroid pack

## 2024-02-29 LAB
ALBUMIN SERPL-MCNC: 4.4 G/DL (ref 3.8–4.9)
ALBUMIN/GLOB SERPL: 1.8 {RATIO} (ref 1.2–2.2)
ALP SERPL-CCNC: 69 IU/L (ref 44–121)
ALT SERPL-CCNC: 17 IU/L (ref 0–32)
AST SERPL-CCNC: 20 IU/L (ref 0–40)
BASOPHILS # BLD AUTO: 0.1 X10E3/UL (ref 0–0.2)
BASOPHILS NFR BLD AUTO: 1 %
BILIRUB SERPL-MCNC: 0.5 MG/DL (ref 0–1.2)
BUN SERPL-MCNC: 10 MG/DL (ref 6–24)
BUN/CREAT SERPL: 13 (ref 9–23)
CALCIUM SERPL-MCNC: 9.6 MG/DL (ref 8.7–10.2)
CHLORIDE SERPL-SCNC: 99 MMOL/L (ref 96–106)
CHOLEST SERPL-MCNC: 165 MG/DL (ref 100–199)
CO2 SERPL-SCNC: 22 MMOL/L (ref 20–29)
CREAT SERPL-MCNC: 0.76 MG/DL (ref 0.57–1)
EGFRCR SERPLBLD CKD-EPI 2021: 94 ML/MIN/1.73
EOSINOPHIL # BLD AUTO: 0.1 X10E3/UL (ref 0–0.4)
EOSINOPHIL NFR BLD AUTO: 1 %
ERYTHROCYTE [DISTWIDTH] IN BLOOD BY AUTOMATED COUNT: 12 % (ref 11.7–15.4)
GLOBULIN SER CALC-MCNC: 2.4 G/DL (ref 1.5–4.5)
GLUCOSE SERPL-MCNC: 76 MG/DL (ref 70–99)
HCT VFR BLD AUTO: 43.4 % (ref 34–46.6)
HCV IGG SERPL QL IA: NON REACTIVE
HDLC SERPL-MCNC: 62 MG/DL
HGB BLD-MCNC: 14.6 G/DL (ref 11.1–15.9)
IMM GRANULOCYTES # BLD AUTO: 0 X10E3/UL (ref 0–0.1)
IMM GRANULOCYTES NFR BLD AUTO: 0 %
LDLC SERPL CALC-MCNC: 85 MG/DL (ref 0–99)
LYMPHOCYTES # BLD AUTO: 2.1 X10E3/UL (ref 0.7–3.1)
LYMPHOCYTES NFR BLD AUTO: 25 %
MCH RBC QN AUTO: 30.6 PG (ref 26.6–33)
MCHC RBC AUTO-ENTMCNC: 33.6 G/DL (ref 31.5–35.7)
MCV RBC AUTO: 91 FL (ref 79–97)
MONOCYTES # BLD AUTO: 0.7 X10E3/UL (ref 0.1–0.9)
MONOCYTES NFR BLD AUTO: 8 %
NEUTROPHILS # BLD AUTO: 5.5 X10E3/UL (ref 1.4–7)
NEUTROPHILS NFR BLD AUTO: 65 %
PLATELET # BLD AUTO: 344 X10E3/UL (ref 150–450)
POTASSIUM SERPL-SCNC: 4.3 MMOL/L (ref 3.5–5.2)
PROT SERPL-MCNC: 6.8 G/DL (ref 6–8.5)
RBC # BLD AUTO: 4.77 X10E6/UL (ref 3.77–5.28)
SODIUM SERPL-SCNC: 137 MMOL/L (ref 134–144)
T4 FREE SERPL-MCNC: 1.34 NG/DL (ref 0.82–1.77)
TRIGL SERPL-MCNC: 102 MG/DL (ref 0–149)
TSH SERPL DL<=0.005 MIU/L-ACNC: 3.3 UIU/ML (ref 0.45–4.5)
VLDLC SERPL CALC-MCNC: 18 MG/DL (ref 5–40)
WBC # BLD AUTO: 8.5 X10E3/UL (ref 3.4–10.8)

## 2024-03-06 DIAGNOSIS — I10 ESSENTIAL HYPERTENSION, BENIGN: ICD-10-CM

## 2024-03-06 RX ORDER — SPIRONOLACTONE 100 MG/1
TABLET, FILM COATED ORAL
Qty: 90 TABLET | Refills: 3 | Status: SHIPPED | OUTPATIENT
Start: 2024-03-06 | End: 2025-03-04

## 2024-03-06 RX ORDER — HYDROCHLOROTHIAZIDE 25 MG/1
12.5 TABLET ORAL DAILY
Qty: 90 TABLET | Refills: 1 | Status: SHIPPED | OUTPATIENT
Start: 2024-03-06

## 2024-03-06 NOTE — TELEPHONE ENCOUNTER
Medicine Refill Request    Last Office Visit: 2/27/2024   Last Consult Visit: Visit date not found  Last Telemedicine Visit: 1/12/2024 Arabella Johnson CRNP    Next Appointment: 3/7/2024      Current Outpatient Medications:   •  BIOTIN ORAL, Take 5,000 mg by mouth daily., Disp: , Rfl:   •  buPROPion XL (WELLBUTRIN XL) 150 mg 24 hr tablet, TAKE 1 TABLET DAILY. TAKE WITH 300 MG TABLETS (TOTAL 450 MG), Disp: 90 tablet, Rfl: 3  •  buPROPion XL (WELLBUTRIN XL) 300 mg 24 hr tablet, TAKE 1 TABLET DAILY. TAKE WITH 150 MG TABLETS (TOTAL 450 MG), Disp: 90 tablet, Rfl: 3  •  chromium picolinate 200 mcg tablet, Take by mouth., Disp: , Rfl:   •  clioquinol-hydrocortisone (ALA-GILLES) 3-0.5 % cream, External, Disp: , Rfl:   •  crisaborole (EUCRISA) 2 % ointment, Eucrisa 2 % topical ointment, Disp: , Rfl:   •  famotidine (PEPCID) 40 mg tablet, Take 1 tablet (40 mg total) by mouth 2 (two) times a day., Disp: 180 tablet, Rfl: 1  •  fluconazole (DIFLUCAN) 150 mg tablet, Take one pill once. If symptoms do not resolve take another pill 48 hours after first pill., Disp: 2 tablet, Rfl: 0  •  hydrochlorothiazide (HYDRODIURIL) 25 mg tablet, TAKE 1 TABLET DAILY (Patient taking differently: Take 12.5 mg by mouth daily.), Disp: 90 tablet, Rfl: 3  •  L. acidophilus/pectin, citrus (ACIDOPHILUS PROBIOTIC ORAL), Take 0.5 mg by mouth., Disp: , Rfl:   •  Lactobacillus acidophilus (PROBIOTIC ORAL), Take by mouth daily., Disp: , Rfl:   •  levonorgestreL (LILETTA) 18.6 mcg/24 hrs (6 yrs) 52 mg intrauterine device intrauterine device, 1 kit by intrauterine route once., Disp: , Rfl:   •  loratadine (CLARITIN) 10 mg tablet, Take 10 mg by mouth daily., Disp: , Rfl:   •  metFORMIN XR (GLUCOPHAGE-XR) 750 mg 24 hr tablet, Take 2 tablets (1,500 mg total) by mouth daily with dinner., Disp: 180 tablet, Rfl: 1  •  rosuvastatin (CRESTOR) 5 mg tablet, TAKE 1 TABLET DAILY, Disp: 90 tablet, Rfl: 3  •  spironolactone (ALDACTONE) 100 mg tablet, TAKE 1 TABLET DAILY,  "Disp: 90 tablet, Rfl: 3      BP Readings from Last 3 Encounters:   02/27/24 122/82   10/23/23 120/84   10/17/23 100/60       Recent Lab results:  Lab Results   Component Value Date    CHOL 165 02/28/2024   ,   Lab Results   Component Value Date    HDL 62 02/28/2024   ,   Lab Results   Component Value Date    LDLCALC 85 02/28/2024   ,   Lab Results   Component Value Date    TRIG 102 02/28/2024        Lab Results   Component Value Date    GLUCOSE 76 02/28/2024   , No results found for: \"HGBA1C\"      Lab Results   Component Value Date    CREATININE 0.76 02/28/2024       Lab Results   Component Value Date    TSH 3.300 02/28/2024         No results found for: \"HGBA1C\"    "

## 2024-03-07 ENCOUNTER — OFFICE VISIT (OUTPATIENT)
Dept: INTERNAL MEDICINE | Facility: CLINIC | Age: 54
End: 2024-03-07
Payer: COMMERCIAL

## 2024-03-07 VITALS
HEART RATE: 82 BPM | TEMPERATURE: 98.2 F | BODY MASS INDEX: 31.65 KG/M2 | OXYGEN SATURATION: 96 % | DIASTOLIC BLOOD PRESSURE: 84 MMHG | SYSTOLIC BLOOD PRESSURE: 136 MMHG | RESPIRATION RATE: 16 BRPM | WEIGHT: 172 LBS | HEIGHT: 62 IN

## 2024-03-07 DIAGNOSIS — E78.49 OTHER HYPERLIPIDEMIA: ICD-10-CM

## 2024-03-07 DIAGNOSIS — Z00.00 ENCOUNTER FOR ANNUAL PHYSICAL EXAM: Primary | ICD-10-CM

## 2024-03-07 DIAGNOSIS — Z12.31 VISIT FOR SCREENING MAMMOGRAM: ICD-10-CM

## 2024-03-07 DIAGNOSIS — E66.9 OBESITY (BMI 30-39.9): ICD-10-CM

## 2024-03-07 DIAGNOSIS — I10 ESSENTIAL HYPERTENSION, BENIGN: ICD-10-CM

## 2024-03-07 DIAGNOSIS — M25.552 BILATERAL HIP PAIN: ICD-10-CM

## 2024-03-07 DIAGNOSIS — M25.551 BILATERAL HIP PAIN: ICD-10-CM

## 2024-03-07 PROCEDURE — 3079F DIAST BP 80-89 MM HG: CPT | Performed by: INTERNAL MEDICINE

## 2024-03-07 PROCEDURE — 3008F BODY MASS INDEX DOCD: CPT | Performed by: INTERNAL MEDICINE

## 2024-03-07 PROCEDURE — 99396 PREV VISIT EST AGE 40-64: CPT | Performed by: INTERNAL MEDICINE

## 2024-03-07 PROCEDURE — 3075F SYST BP GE 130 - 139MM HG: CPT | Performed by: INTERNAL MEDICINE

## 2024-03-07 RX ORDER — ROSUVASTATIN CALCIUM 5 MG/1
5 TABLET, COATED ORAL DAILY
Qty: 90 TABLET | Refills: 3 | Status: SHIPPED | OUTPATIENT
Start: 2024-03-07 | End: 2025-03-27

## 2024-03-07 RX ORDER — METFORMIN HYDROCHLORIDE 750 MG/1
1500 TABLET, EXTENDED RELEASE ORAL
Qty: 180 TABLET | Refills: 1 | Status: SHIPPED | OUTPATIENT
Start: 2024-03-07 | End: 2024-09-23

## 2024-03-07 ASSESSMENT — ENCOUNTER SYMPTOMS
FREQUENCY: 0
CHILLS: 0
HEADACHES: 0
SORE THROAT: 0
DYSURIA: 0
WEAKNESS: 0
DIFFICULTY URINATING: 0
COUGH: 0
JOINT SWELLING: 0
ABDOMINAL PAIN: 0
BACK PAIN: 0
SEIZURES: 0
DIZZINESS: 0
FEVER: 0
NECK PAIN: 1
ARTHRALGIAS: 1
BRUISES/BLEEDS EASILY: 0
SLEEP DISTURBANCE: 0
PALPITATIONS: 0
UNEXPECTED WEIGHT CHANGE: 0
SHORTNESS OF BREATH: 0
SINUS PRESSURE: 0
NUMBNESS: 0
DIARRHEA: 0
FATIGUE: 0
CONSTIPATION: 1
NERVOUS/ANXIOUS: 1
DYSPHORIC MOOD: 0

## 2024-03-07 ASSESSMENT — PATIENT HEALTH QUESTIONNAIRE - PHQ9: SUM OF ALL RESPONSES TO PHQ9 QUESTIONS 1 & 2: 0

## 2024-03-07 NOTE — PROGRESS NOTES
Subjective      Patient ID: Aurora Cedillo is a 53 y.o. female.    HPI    Patient presents for a physical.  Saw Ortho, Dr. Szymanski, last week and had an injection in her right hip bursa which relieved her symptoms significantly.  Still taking her diclofenac twice a day.  Started seeing a therapist. Taking Wellbutrin XL 450mg daily. Had to cancel her [facility] appt with Dr. Macdonald due to her dog dying. Needs refill of metformin.  GYN visit was 2/6/24.  Reviewed note.  Pap smear was in 2023.  Due in 2026.  Colonoscopy was in 2019.  Due again in 10 years.  Mammogram was 10/2022.  Overdue.  Up-to-date with vaccines.  Up-to-date with skin and eye exams.  Had another cortisone shot in the cervical spine with Dr. Mustafa for neck and shoulder pain which is all better.  Reviewed lab results which were all completely normal.  No other new/acute concerns.    The following have been reviewed and updated as appropriate in this visit:     Allergies  Meds  Problems         Past Medical History:   Diagnosis Date   • Allergic rhinitis    • Anxiety 5 years ago   • Arthritis    • Back pain    • BMI 40.0-44.9, adult (CMS/[facility])    • Cellulitis    • COVID-19 virus infection 9/19/2022   • Depression 06/28/2012   • Dysfunction of both eustachian tubes    • Essential hypertension, benign 06/28/2012   • GERD (gastroesophageal reflux disease) After lap band   • Gout 15-20 years ago    No longer have   • High cholesterol 20 years ago   • Hyperlipidemia    • Irregular menses    • Plantar fasciitis 20 years ago   • Right bundle branch block 06/28/2012   • Sensorineural hearing loss, bilateral    • Shingles     on head, age 20's   • Sinusitis    • Skin abnormalities All my life    Several skin issues some related to allergies, stress   • Skin rash    • Sleep apnea    • Snoring    • UTI (urinary tract infection)    • Varicose veins of lower extremity    • Vitamin D deficiency    • Yeast infection      Past Surgical History:   Procedure Laterality  Date   • CARPAL TUNNEL RELEASE  25 years ago    Corrected with surgery   • COLONOSCOPY     • ENDOMETRIAL BIOPSY     • KNEE ARTHROSCOPY  15 years ago   • KNEE SURGERY  7 years ago   • LAPAROSCOPIC GASTRIC BANDING  15 years ago?    Unsure of exact timeframe   • PARTIAL KNEE ARTHROPLASTY Left    • SINUS SURGERY     • VAGINAL DELIVERY       Family History   Problem Relation Age of Onset   • Atrial fibrillation Biological Mother    • Anxiety disorder Biological Mother    • Atrial fibrillation Biological Father    • Stroke Biological Father         with cognitive impairment   • Heart disease Biological Father    • Hypertension Biological Father    • Mental illness Biological Father    • Obesity Biological Father    • Atrial fibrillation Biological Brother    • Stroke Biological Brother          of CVA age 52   • ADD / ADHD Biological Daughter    • Anxiety disorder Biological Daughter    • No Known Problems Biological Son    • Autism spectrum disorder Biological Son         high functioning   • ADD / ADHD Biological Son         oldest, declines meds     Social History     Socioeconomic History   • Marital status:      Spouse name: None   • Number of children: 3   • Years of education: None   • Highest education level: None   Occupational History   • Occupation: [employer]   Tobacco Use   • Smoking status: Former     Packs/day: 0.50     Years: 5.00     Additional pack years: 0.00     Total pack years: 2.50     Types: Cigarettes     Start date: 1988     Quit date: 1993     Years since quittin.2   • Smokeless tobacco: Never   Vaping Use   • Vaping Use: Never used   Substance and Sexual Activity   • Alcohol use: Yes     Comment: once in a while   • Drug use: Not Currently   • Sexual activity: Yes     Partners: Male     Birth control/protection: Abstinence, Coitus interruptus/Pulling Out, Condom Male     Comment: Unable to take pill due to side effects.   Social History Narrative    Children- 2 sons (16,  10), 1 daughter (13). 2021    Caffeine - coffee 1 daily. Iced tea, unsweetened    Exercise- yoga , PT    Diet- intermittent fasting, weekdays    Pets- none. Dog .       Review of Systems   Constitutional: Negative for chills, fatigue, fever and unexpected weight change.   HENT: Negative for congestion, hearing loss, sinus pressure and sore throat.    Eyes: Negative for visual disturbance.   Respiratory: Negative for cough and shortness of breath.    Cardiovascular: Positive for leg swelling (At the end of the day.  Better in the mornings). Negative for chest pain and palpitations.   Gastrointestinal: Positive for constipation. Negative for abdominal pain and diarrhea.   Endocrine: Negative for cold intolerance and heat intolerance.   Genitourinary: Negative for difficulty urinating, dysuria, frequency, pelvic pain, vaginal bleeding and vaginal discharge.   Musculoskeletal: Positive for arthralgias (R. hip) and neck pain. Negative for back pain and joint swelling.   Skin: Negative for rash.   Allergic/Immunologic: Negative for environmental allergies and food allergies.   Neurological: Negative for dizziness, seizures, weakness, numbness and headaches.   Hematological: Does not bruise/bleed easily.   Psychiatric/Behavioral: Negative for dysphoric mood and sleep disturbance. The patient is nervous/anxious.        Allergies   Allergen Reactions   • Neomycin Hives   • Losartan Other (see comments)     Fatigue   • Neomycin-Bacitracnzn-Polymyxnb Itching   • Penicillins    • Latex Rash   • Miconazole Rash     Current Outpatient Medications   Medication Sig Dispense Refill   • BIOTIN ORAL Take 5,000 mg by mouth daily.     • buPROPion XL (WELLBUTRIN XL) 150 mg 24 hr tablet TAKE 1 TABLET DAILY. TAKE WITH 300 MG TABLETS (TOTAL 450 MG) 90 tablet 3   • buPROPion XL (WELLBUTRIN XL) 300 mg 24 hr tablet TAKE 1 TABLET DAILY. TAKE WITH 150 MG TABLETS (TOTAL 450 MG) 90 tablet 3   • chromium picolinate 200 mcg tablet Take by  "mouth.     • clioquinol-hydrocortisone (ALA-GILLES) 3-0.5 % cream External     • crisaborole (EUCRISA) 2 % ointment Eucrisa 2 % topical ointment     • famotidine (PEPCID) 40 mg tablet Take 1 tablet (40 mg total) by mouth 2 (two) times a day. 180 tablet 1   • fluconazole (DIFLUCAN) 150 mg tablet Take one pill once. If symptoms do not resolve take another pill 48 hours after first pill. 2 tablet 0   • hydrochlorothiazide (HYDRODIURIL) 25 mg tablet Take 0.5 tablets (12.5 mg total) by mouth daily. 90 tablet 1   • L. acidophilus/pectin, citrus (ACIDOPHILUS PROBIOTIC ORAL) Take 0.5 mg by mouth.     • Lactobacillus acidophilus (PROBIOTIC ORAL) Take by mouth daily.     • levonorgestreL (LILETTA) 18.6 mcg/24 hrs (6 yrs) 52 mg intrauterine device intrauterine device 1 kit by intrauterine route once.     • loratadine (CLARITIN) 10 mg tablet Take 10 mg by mouth daily.     • metFORMIN XR (GLUCOPHAGE-XR) 750 mg 24 hr tablet Take 2 tablets (1,500 mg total) by mouth daily with dinner. 180 tablet 1   • rosuvastatin (CRESTOR) 5 mg tablet Take 1 tablet (5 mg total) by mouth daily. 90 tablet 3   • spironolactone (ALDACTONE) 100 mg tablet TAKE 1 TABLET DAILY 90 tablet 3     No current facility-administered medications for this visit.       Objective   Vitals:    03/07/24 0851   BP: 136/84   Pulse: 82   Resp: 16   Temp: 36.8 °C (98.2 °F)   SpO2: 96%   Weight: 78 kg (172 lb)   Height: 1.575 m (5' 2\")     Body mass index is 31.46 kg/m².    Physical Exam  Constitutional:       Appearance: She is well-developed.   HENT:      Head: Normocephalic and atraumatic.      Right Ear: Ear canal and external ear normal. A middle ear effusion is present.      Left Ear: Ear canal and external ear normal. A middle ear effusion is present.      Nose: Nose normal.      Mouth/Throat:      Mouth: Mucous membranes are moist.      Pharynx: Oropharynx is clear.   Eyes:      Conjunctiva/sclera: Conjunctivae normal.      Pupils: Pupils are equal, round, and " reactive to light.   Neck:      Thyroid: No thyromegaly.   Cardiovascular:      Rate and Rhythm: Normal rate and regular rhythm.      Heart sounds: Normal heart sounds. No murmur heard.  Pulmonary:      Effort: Pulmonary effort is normal. No respiratory distress.      Breath sounds: Normal breath sounds. No wheezing or rales.   Abdominal:      General: Bowel sounds are normal. There is no distension.      Palpations: Abdomen is soft.      Tenderness: There is no abdominal tenderness. There is no guarding or rebound.   Musculoskeletal:         General: Normal range of motion.      Cervical back: Normal range of motion and neck supple.      Right lower leg: Edema (Nonpitting trace-1+ edema of ankle) present.      Left lower leg: Edema (Nonpitting trace-1+ edema of ankle) present.   Skin:     General: Skin is warm and dry.      Findings: No rash.   Neurological:      General: No focal deficit present.      Mental Status: She is alert and oriented to person, place, and time.      Cranial Nerves: No cranial nerve deficit.      Sensory: No sensory deficit.   Psychiatric:         Mood and Affect: Mood normal.         Behavior: Behavior normal.         Thought Content: Thought content normal.         Judgment: Judgment normal.         Assessment/Plan   Problem List Items Addressed This Visit        Circulatory    Essential hypertension, benign     Previously took losartan but stopped due to low blood pressure with lightheadedness and fatigue.  Currently on HCTZ 12.5 mg and spironolactone 100 mg daily.  BP at goal            Musculoskeletal    Bilateral hip pain     Status post cortisone injection of the right hip bursa with Dr. Szymanski.  Feeling much better.  Will complete diclofenac for another couple days then wean off            Endocrine/Metabolic    Obesity (BMI 30-39.9)     BMI 31.5. continue close f/u with the [facility]. Renewed Metformin            Other    Hyperlipidemia     Well-controlled on rosuvastatin          Relevant Medications    rosuvastatin (CRESTOR) 5 mg tablet    Encounter for annual physical exam - Primary     Labs normal.  Up-to-date with colonoscopy, Pap smear, skin and eye exams.  Due for mammogram.  Provided order.  Up-to-date with vaccines         Visit for screening mammogram    Relevant Orders    BI SCREENING MAMMOGRAM BILATERAL(TOMOSYNTHESIS)       Caren Guardado MD    3/7/2024

## 2024-03-07 NOTE — ASSESSMENT & PLAN NOTE
Previously took losartan but stopped due to low blood pressure with lightheadedness and fatigue.  Currently on HCTZ 12.5 mg and spironolactone 100 mg daily.  BP at goal

## 2024-03-07 NOTE — ASSESSMENT & PLAN NOTE
Labs normal.  Up-to-date with colonoscopy, Pap smear, skin and eye exams.  Due for mammogram.  Provided order.  Up-to-date with vaccines

## 2024-03-07 NOTE — ASSESSMENT & PLAN NOTE
Status post cortisone injection of the right hip bursa with Dr. Szymanski.  Feeling much better.  Will complete diclofenac for another couple days then wean off

## 2024-05-13 ENCOUNTER — TELEPHONE (OUTPATIENT)
Dept: INTERNAL MEDICINE | Facility: CLINIC | Age: 54
End: 2024-05-13

## 2024-05-13 NOTE — TELEPHONE ENCOUNTER
Pt called stating that she started sharp pain in her Rt side bottom of rib cage.  The pain comes every 30 seconds. Pt has diarrhea this morning. Her family has issues with gallbladder. Pt would like to know what to do.  Pt has an appointment with Mindy today at 1:00. Advised her to go to the ED for faster evaluation. It may take another couple of days to get imaging as outpatient setting if she comes to the office today to get orders.  Pt will speak with her  and still wants to keep an appointment.

## 2024-05-16 ENCOUNTER — OFFICE VISIT (OUTPATIENT)
Dept: INTERNAL MEDICINE | Facility: CLINIC | Age: 54
End: 2024-05-16
Payer: COMMERCIAL

## 2024-05-16 VITALS
HEART RATE: 73 BPM | TEMPERATURE: 98 F | OXYGEN SATURATION: 99 % | HEIGHT: 62 IN | BODY MASS INDEX: 31.28 KG/M2 | DIASTOLIC BLOOD PRESSURE: 80 MMHG | WEIGHT: 170 LBS | SYSTOLIC BLOOD PRESSURE: 112 MMHG | RESPIRATION RATE: 16 BRPM

## 2024-05-16 DIAGNOSIS — D72.829 LEUKOCYTOSIS, UNSPECIFIED TYPE: ICD-10-CM

## 2024-05-16 DIAGNOSIS — R10.11 RUQ ABDOMINAL PAIN: ICD-10-CM

## 2024-05-16 DIAGNOSIS — M25.551 RIGHT HIP PAIN: Primary | ICD-10-CM

## 2024-05-16 PROCEDURE — 3074F SYST BP LT 130 MM HG: CPT | Performed by: INTERNAL MEDICINE

## 2024-05-16 PROCEDURE — 99214 OFFICE O/P EST MOD 30 MIN: CPT | Performed by: INTERNAL MEDICINE

## 2024-05-16 PROCEDURE — 3079F DIAST BP 80-89 MM HG: CPT | Performed by: INTERNAL MEDICINE

## 2024-05-16 PROCEDURE — 3008F BODY MASS INDEX DOCD: CPT | Performed by: INTERNAL MEDICINE

## 2024-05-16 RX ORDER — CELECOXIB 100 MG/1
100 CAPSULE ORAL 2 TIMES DAILY
Qty: 60 CAPSULE | Refills: 1 | Status: SHIPPED | OUTPATIENT
Start: 2024-05-16 | End: 2024-12-03

## 2024-05-16 ASSESSMENT — ENCOUNTER SYMPTOMS
SHORTNESS OF BREATH: 0
NAUSEA: 0
HEMATURIA: 0
FEVER: 0
WEAKNESS: 0
DIFFICULTY URINATING: 0
ARTHRALGIAS: 1
VOMITING: 0
DYSURIA: 0
FLANK PAIN: 0
UNEXPECTED WEIGHT CHANGE: 0
ABDOMINAL PAIN: 1
NUMBNESS: 0
DIARRHEA: 0
CONSTIPATION: 0
BACK PAIN: 0

## 2024-05-16 NOTE — ASSESSMENT & PLAN NOTE
Persistent. Likely bursitis or tendinitis in the greater trochanteric region. No improvement with 2 injections and PT with Dr. Szymanski. Pt will schedule f/u with specialist who can provide US guided injection vs [facility]. Will prescribe celebrex until her appt with ortho

## 2024-05-16 NOTE — PROGRESS NOTES
Subjective      Patient ID: Aurora Cedillo is a 53 y.o. female.    HPI    Patient presents for follow up. Has been having R.hip pain. Saw Dr. Szymanski ortho at [facility] and had 2 injections and physical therapy.  No significant improvement.  Was referred to another specialist who can do an ultrasound-guided injection but did not go yet.  Considering seeing someone at [facility].  Went to the ER 3 days ago with colicky, stabbing pain in the right upper quadrant every 30 seconds for about a day. RUQ US normal including gallbladder.  Labs showed increased WBC, but states she was taking oral steroids.  RUQ pain is resolved.  Normal BMs.  Normal appetite.  No other new/acute concerns.    The following have been reviewed and updated as appropriate in this visit:     Allergies  Meds  Problems         Past Medical History:   Diagnosis Date    Allergic rhinitis     Anxiety 5 years ago    Arthritis     Back pain     BMI 40.0-44.9, adult (CMS/[facility])     Cellulitis     COVID-19 virus infection 9/19/2022    Depression 06/28/2012    Dysfunction of both eustachian tubes     Essential hypertension, benign 06/28/2012    GERD (gastroesophageal reflux disease) After lap band    Gout 15-20 years ago    No longer have    High cholesterol 20 years ago    Hyperlipidemia     Irregular menses     Plantar fasciitis 20 years ago    Right bundle branch block 06/28/2012    Sensorineural hearing loss, bilateral     Shingles     on head, age 20's    Sinusitis     Skin abnormalities All my life    Several skin issues some related to allergies, stress    Skin rash     Sleep apnea     Snoring     UTI (urinary tract infection)     Varicose veins of lower extremity     Vitamin D deficiency     Yeast infection      Past Surgical History:   Procedure Laterality Date    CARPAL TUNNEL RELEASE  25 years ago    Corrected with surgery    COLONOSCOPY      ENDOMETRIAL BIOPSY  2022    KNEE ARTHROSCOPY  15 years ago    KNEE SURGERY  7 years  ago    LAPAROSCOPIC GASTRIC BANDING  15 years ago?    Unsure of exact timeframe    PARTIAL KNEE ARTHROPLASTY Left     SINUS SURGERY      VAGINAL DELIVERY       Family History   Problem Relation Age of Onset    Atrial fibrillation Biological Mother     Anxiety disorder Biological Mother     Atrial fibrillation Biological Father     Stroke Biological Father         with cognitive impairment    Heart disease Biological Father     Hypertension Biological Father     Mental illness Biological Father     Obesity Biological Father     Atrial fibrillation Biological Brother     Stroke Biological Brother          of CVA age 52    ADD / ADHD Biological Daughter     Anxiety disorder Biological Daughter     No Known Problems Biological Son     Autism spectrum disorder Biological Son         high functioning    ADD / ADHD Biological Son         oldest, declines meds     Social History     Socioeconomic History    Marital status:      Spouse name: None    Number of children: 3    Years of education: None    Highest education level: None   Occupational History    Occupation: [employer]   Tobacco Use    Smoking status: Former     Packs/day: 0.50     Years: 5.00     Additional pack years: 0.00     Total pack years: 2.50     Types: Cigarettes     Start date: 1988     Quit date: 1993     Years since quittin.3    Smokeless tobacco: Never   Vaping Use    Vaping Use: Never used   Substance and Sexual Activity    Alcohol use: Yes     Comment: once in a while    Drug use: Not Currently    Sexual activity: Yes     Partners: Male     Birth control/protection: Abstinence, Coitus interruptus/Pulling Out, Condom Male     Comment: Unable to take pill due to side effects.   Social History Narrative    Children- 2 sons (16, 10), 1 daughter (13). 2021    Caffeine - coffee 1 daily. Iced tea, unsweetened    Exercise- yoga , PT    Diet- intermittent fasting, weekdays    Pets- none. Dog .       Review of Systems   Constitutional:   Negative for fever and unexpected weight change.   Respiratory:  Negative for shortness of breath.    Cardiovascular:  Negative for chest pain.   Gastrointestinal:  Positive for abdominal pain (RUQ, resolved). Negative for constipation, diarrhea, nausea and vomiting.   Genitourinary:  Negative for difficulty urinating, dysuria, flank pain, hematuria and urgency.   Musculoskeletal:  Positive for arthralgias and gait problem (steps). Negative for back pain.   Neurological:  Negative for weakness and numbness.       Allergies   Allergen Reactions    Neomycin Hives    Losartan Other (see comments)     Fatigue    Neomycin-Bacitracnzn-Polymyxnb Itching    Penicillins     Latex Rash    Miconazole Rash     Current Outpatient Medications   Medication Sig Dispense Refill    BIOTIN ORAL Take 5,000 mg by mouth daily.      buPROPion XL (WELLBUTRIN XL) 150 mg 24 hr tablet TAKE 1 TABLET DAILY. TAKE WITH 300 MG TABLETS (TOTAL 450 MG) 90 tablet 3    buPROPion XL (WELLBUTRIN XL) 300 mg 24 hr tablet TAKE 1 TABLET DAILY. TAKE WITH 150 MG TABLETS (TOTAL 450 MG) 90 tablet 3    celecoxib (CeleBREX) 100 mg capsule Take 1 capsule (100 mg total) by mouth 2 (two) times a day. 60 capsule 1    chromium picolinate 200 mcg tablet Take by mouth.      clioquinol-hydrocortisone (ALA-GILLES) 3-0.5 % cream External      famotidine (PEPCID) 40 mg tablet Take 1 tablet (40 mg total) by mouth 2 (two) times a day. 180 tablet 1    fluconazole (DIFLUCAN) 150 mg tablet Take one pill once. If symptoms do not resolve take another pill 48 hours after first pill. 2 tablet 0    hydrochlorothiazide (HYDRODIURIL) 25 mg tablet Take 0.5 tablets (12.5 mg total) by mouth daily. 90 tablet 1    L. acidophilus/pectin, citrus (ACIDOPHILUS PROBIOTIC ORAL) Take 0.5 mg by mouth.      Lactobacillus acidophilus (PROBIOTIC ORAL) Take by mouth daily.      levonorgestreL (LILETTA) 18.6 mcg/24 hrs (6 yrs) 52 mg intrauterine device intrauterine device 1 kit by intrauterine route once.  "     loratadine (CLARITIN) 10 mg tablet Take 10 mg by mouth daily.      metFORMIN XR (GLUCOPHAGE-XR) 750 mg 24 hr tablet Take 2 tablets (1,500 mg total) by mouth daily with dinner. 180 tablet 1    rosuvastatin (CRESTOR) 5 mg tablet Take 1 tablet (5 mg total) by mouth daily. 90 tablet 3    spironolactone (ALDACTONE) 100 mg tablet TAKE 1 TABLET DAILY 90 tablet 3    crisaborole (EUCRISA) 2 % ointment Eucrisa 2 % topical ointment       No current facility-administered medications for this visit.       Objective   Vitals:    05/16/24 0905   BP: 112/80   Pulse: 73   Resp: 16   Temp: 36.7 °C (98 °F)   SpO2: 99%   Weight: 77.1 kg (170 lb)   Height: 1.575 m (5' 2\")     Body mass index is 31.09 kg/m².    Physical Exam  Constitutional:       Appearance: Normal appearance. She is well-developed.   HENT:      Head: Normocephalic and atraumatic.   Eyes:      General: Lids are normal.   Cardiovascular:      Rate and Rhythm: Normal rate and regular rhythm.      Heart sounds: Normal heart sounds, S1 normal and S2 normal. No murmur heard.  Pulmonary:      Effort: Pulmonary effort is normal.      Breath sounds: Normal breath sounds. No decreased breath sounds, rhonchi or rales.   Abdominal:      General: Bowel sounds are normal.      Palpations: Abdomen is soft.      Tenderness: There is no abdominal tenderness. There is no guarding or rebound.   Musculoskeletal:      Cervical back: Neck supple.   Skin:     General: Skin is warm and dry.   Neurological:      General: No focal deficit present.      Mental Status: She is alert and oriented to person, place, and time.      Cranial Nerves: No cranial nerve deficit.      Gait: Gait normal.   Psychiatric:         Mood and Affect: Mood normal.         Behavior: Behavior normal.         Thought Content: Thought content normal.         Judgment: Judgment normal.         Assessment/Plan   Problem List Items Addressed This Visit          Nervous    RUQ abdominal pain     Resolved. RUQ US " negative for GB disease. Monitor for any recurrent symptoms.            Musculoskeletal    Right hip pain - Primary     Persistent. Likely bursitis or tendinitis in the greater trochanteric region. No improvement with 2 injections and PT with Dr. Szymanski. Pt will schedule f/u with specialist who can provide US guided injection vs [facility]. Will prescribe celebrex until her appt with ortho         Relevant Medications    celecoxib (CeleBREX) 100 mg capsule       Hematologic    Leukocytosis     Likely related to oral steroid. Will check f/u labs to ensure resolution         Relevant Medications    celecoxib (CeleBREX) 100 mg capsule    Other Relevant Orders    CBC and Differential       Caren Guardado MD    5/16/2024

## 2024-05-23 LAB
APPEARANCE UR: CLEAR
BACTERIA #/AREA URNS HPF: ABNORMAL /[HPF]
BILIRUB UR QL STRIP: NEGATIVE
CASTS URNS QL MICRO: ABNORMAL /LPF
COLOR UR: YELLOW
EPI CELLS #/AREA URNS HPF: ABNORMAL /HPF (ref 0–10)
GLUCOSE UR QL STRIP: ABNORMAL
HGB UR QL STRIP: ABNORMAL
KETONES UR QL STRIP: NEGATIVE
LEUKOCYTE ESTERASE UR QL STRIP: ABNORMAL
MICRO URNS: ABNORMAL
NITRITE UR QL STRIP: POSITIVE
PH UR STRIP: 6 [PH] (ref 5–7.5)
PROT UR QL STRIP: NEGATIVE
RBC #/AREA URNS HPF: ABNORMAL /HPF (ref 0–2)
SP GR UR STRIP: 1.01 (ref 1–1.03)
UROBILINOGEN UR STRIP-MCNC: 1 MG/DL (ref 0.2–1)
WBC #/AREA URNS HPF: ABNORMAL /HPF (ref 0–5)

## 2024-05-29 ENCOUNTER — TELEPHONE (OUTPATIENT)
Dept: INTERNAL MEDICINE | Facility: CLINIC | Age: 54
End: 2024-05-29
Payer: COMMERCIAL

## 2024-05-29 DIAGNOSIS — R30.0 DYSURIA: Primary | ICD-10-CM

## 2024-05-29 NOTE — TELEPHONE ENCOUNTER
Patient noted to have urinalysis results which were positive from 5/22/2024 but no culture results.  Not ordered recently from our office.  On further review from , patient had possible UTI symptoms.  Cannot get an appointment and called her GYN who prescribed her medication.  Patient felt old UA order from 7/2023 and taken to the lab before taking the antibiotic.  Completed the antibiotic but symptoms are not completely resolved.  Will give a couple more days for antibiotic to liver system and have her reobtain UA and culture and treat again if indicated.

## 2024-05-29 NOTE — LETTER
[facility]  of    Account #: [id number] Collection Date:         Req/Control #: [id number] Collection Time:            Client / Ordering Site Information: Physician Information:   Account Name: [facility] [place]   Address 1: [facility] [address]   Address 2:    [facility] Zip: [place] [address]   Phone: [phone]    Ordering: Caren Guardado   Degree: MD   NPI: [id number]   UPIN:    Physician ID:          Patient Information:   Name: DARION ARENAS   Legal Sex: Female   SSN: [id number]   Patient ID: [id number]    YOB: 1970 (53 years)   Phone: [phone]   Address: [address] [place] [address]            Comments:         Order Code Tests Ordered (Total: 2)    Order Code Tests Ordered      [id number] Urine culture    [id number] Urinalysis with microscopic            Specimen Source:   ([id number]) Urine culture:  Urine, Clean Catch    ([id number]) Urinalysis with microscopic:  Urine, Clean Catch            Diagnosis Codes:   R30.0                 Bill Type: Third-Party    Carrier Code:          Responsible Party / Guarantor Information:   Name: DARION ARENAS   Address: [address] [place] [address] Zip: [place] [address]   Phone: [phone]   Relation to Pt: Self   Employer Name:          ABN:     Worker's Comp: N Date of Injury:             Insurance Information:   Primary Insurance: Secondary Insurance:   Carrier Code: Not on file   Ins Co Name: CIG[employer] PPO / EPO   Address 1: [facility] [id number]   Address 2:    [facility] Zip: [place] [address]   Policy Number: [id number]   Group #: [id number]    Carrier Code:    Ins Co Name:    Address 1:    Address 2:    City, State Zip:    Policy Number:    Group #:       Primary Policy [place] / Insured: Secondary Policy [employer] / Insured:   Name: DARION ARENAS   Address: [address] [place] [address]   Pt Relation to Subscriber: Self     Name:    Address:         Pt Relation to Subscriber:           Authorization - Please Sign and Date  I hereby authorize the release of medical information related to the services described hereon and authorize payment directly to [facility].      E-Signature: Caren Guardado MD                          5/29/2024      __  Provider Signature             Date      __________________________________                    ______________  Patient Signature                                                                    Date

## 2024-05-29 NOTE — TELEPHONE ENCOUNTER
I called and spoke to the patient and she stated that she was prescribed sulfadiazine for UTI symptoms by her gyn doctor,  The patient stated that she had an old script for UA and culture that she was given in 7/2023 which she never used.  The patient used this to provide a urine specimen prior to taking the medication.  Dr. Guardado received the results and stated her UA was abnormal but a culture was not done and would have to be redone.  The patient stated that her urinary symptoms have improved a lot but doesn't feel that they resolved fully.  I told the patient that Dr. Guardado is going to reorder the UA/culture and she can redo this to see if she would need a different antibiotic.  I also told the patient to wait at least 3 days after finishing the antibiotic before redoing the UA/culture.  The patient stated that her symptoms are not severe and she will do this at the end of the week.  The patient asked that we please send the script for the UA/Culture to her patient portal.

## 2024-06-01 LAB
APPEARANCE UR: CLEAR
BACTERIA #/AREA URNS HPF: NORMAL /[HPF]
BACTERIA UR CULT: NORMAL
BACTERIA UR CULT: NORMAL
BILIRUB UR QL STRIP: NEGATIVE
CASTS URNS QL MICRO: NORMAL /LPF
COLOR UR: YELLOW
EPI CELLS #/AREA URNS HPF: NORMAL /HPF (ref 0–10)
GLUCOSE UR QL STRIP: NEGATIVE
HGB UR QL STRIP: NEGATIVE
KETONES UR QL STRIP: NEGATIVE
LEUKOCYTE ESTERASE UR QL STRIP: ABNORMAL
MICRO URNS: ABNORMAL
NITRITE UR QL STRIP: NEGATIVE
PH UR STRIP: 7.5 [PH] (ref 5–7.5)
PROT UR QL STRIP: NEGATIVE
RBC #/AREA URNS HPF: NORMAL /HPF (ref 0–2)
SP GR UR STRIP: 1.01 (ref 1–1.03)
UROBILINOGEN UR STRIP-MCNC: 0.2 MG/DL (ref 0.2–1)
WBC #/AREA URNS HPF: NORMAL /HPF (ref 0–5)

## 2024-06-05 NOTE — TELEPHONE ENCOUNTER
"----- Message from Teresa Gregory MA sent at 6/4/2024  2:54 PM EDT -----  Regarding: FW: Gastroenterologist referral?  Contact: [phone]    ----- Message -----  From: uArora Cedillo \"MJ\"  Sent: 6/4/2024   2:43 PM EDT  To: [facility] Clinical Support [facility]  Subject: Gastroenterologist referral?                     In the above note, I meant that I’ve had right side and now it’s starting on left side as well, usually after eating.     "

## 2024-06-06 RX ORDER — OMEPRAZOLE 40 MG/1
40 CAPSULE, DELAYED RELEASE ORAL
Qty: 90 CAPSULE | Refills: 1 | Status: SHIPPED | OUTPATIENT
Start: 2024-06-06 | End: 2024-11-11

## 2024-06-18 ENCOUNTER — CLINICAL SUPPORT (OUTPATIENT)
Dept: BARIATRICS/WEIGHT MGMT | Facility: CLINIC | Age: 54
End: 2024-06-18
Payer: COMMERCIAL

## 2024-06-18 VITALS — BODY MASS INDEX: 30.91 KG/M2 | WEIGHT: 169 LBS

## 2024-06-18 DIAGNOSIS — Z71.3 DIETARY COUNSELING: ICD-10-CM

## 2024-06-18 DIAGNOSIS — E66.3 OVERWEIGHT: ICD-10-CM

## 2024-06-18 DIAGNOSIS — E66.9 OBESITY (BMI 30-39.9): Primary | ICD-10-CM

## 2024-06-18 DIAGNOSIS — R60.9 LIPEDEMA: ICD-10-CM

## 2024-06-18 PROCEDURE — 97803 MED NUTRITION INDIV SUBSEQ: CPT | Performed by: DIETITIAN, REGISTERED

## 2024-06-18 NOTE — PROGRESS NOTES
"DETAILS OF VISIT     Consulting Service:  [facility] Comprehensive Weight and Wellness Program - Dietitian    Referring Physician: Caren Guardado MD    PCP: Caren Guardado MD    Reason for Consultation:   Chief Complaint   Patient presents with   • Nutrition Counseling       I met with Aurora Cedillo today for Nutrition Counseling [Z71.3] and dietary education related to the following:    E66.9 - Obesity, unspecified - obesity NOS and Z68.31 - BMI 31.0-31.9, adult       VISIT DESCRIPTION         Aurora Cedillo is a 53 y.o. female here for nutrition follow-up.    Current Weight:   Wt Readings from Last 1 Encounters:   06/18/24 76.7 kg (169 lb)     Height:   Ht Readings from Last 1 Encounters:   05/16/24 1.575 m (5' 2\")        BMI: Body mass index is 30.91 kg/m².    Weight Trends:    Wt Readings from Last 3 Encounters:   06/18/24 76.7 kg (169 lb)   05/16/24 77.1 kg (170 lb)   03/07/24 78 kg (172 lb)       BMI Readings from Last 3 Encounters:   06/18/24 30.91 kg/m²   05/16/24 31.09 kg/m²   03/07/24 31.46 kg/m²       Patient seen by this RD for evaluation in the Comprehensive Weight & Wellness Program. During today's visit, we discussed the following:    Nutrition, Physical Activity, & Emotional Wellness:   Recommended Nutrition Plan  1350 HLD diet + Wellbutrin + Metformin   Meal Structure  5 or less eating events recommended   Eating Interval  Less than 12 hours recommended   Intake Logging  Healthy Lifestyles paper food journal or electronic ([employer] and love and [employer], MFP, LoseIt, Carb Manager, etc)    Successes   Food type -Greek yogurt, dairy  -recommended increasing fibber intake (will switch from gummy to pill form, recommended Bellway)     Food timing -aiming for 12 hour fast + 1 day of 24 hour fast with 500 calorie meal     Food logging -macros discussed, will experiment with [employer] tiff vs. Notes tiff in phone     Medication -taking Wellbutrin and metformin      Challenges Medicine -Ozempic ordered in past, never " started. History of lap band surgery.     Dedicated exercise -increasing swimming    Cooking -does not like to cook    C/o Acid reflux, meeting with GI    Allergies:   Latex (apple, avocado, banana, carrot, celery, chestnut, kiwi, melons, papaya, raw potato, tomato)     Education Provided Navigating obstacles listed above and outlined in goals below     The following hand-outs and/or educational materials were given: Healthy lifestyle journal, LGI hormone balance plan, how to read a food label      A patient-centered approach using motivational interviewing was used to develop the plan and recommendations outlined below.     Time Spent with Patient for face to face office visit: 1 hour      MEDICATIONS, LABS, AND ALLERGIES     Current Outpatient Medications on File Prior to Visit   Medication Sig Dispense Refill   • BIOTIN ORAL Take 5,000 mg by mouth daily.     • buPROPion XL (WELLBUTRIN XL) 150 mg 24 hr tablet TAKE 1 TABLET DAILY. TAKE WITH 300 MG TABLETS (TOTAL 450 MG) 90 tablet 3   • buPROPion XL (WELLBUTRIN XL) 300 mg 24 hr tablet TAKE 1 TABLET DAILY. TAKE WITH 150 MG TABLETS (TOTAL 450 MG) 90 tablet 3   • celecoxib (CeleBREX) 100 mg capsule Take 1 capsule (100 mg total) by mouth 2 (two) times a day. 60 capsule 1   • chromium picolinate 200 mcg tablet Take by mouth.     • clioquinol-hydrocortisone (ALA-GILLES) 3-0.5 % cream External     • crisaborole (EUCRISA) 2 % ointment Eucrisa 2 % topical ointment     • famotidine (PEPCID) 40 mg tablet Take 1 tablet (40 mg total) by mouth 2 (two) times a day. 180 tablet 1   • fluconazole (DIFLUCAN) 150 mg tablet Take one pill once. If symptoms do not resolve take another pill 48 hours after first pill. 2 tablet 0   • hydrochlorothiazide (HYDRODIURIL) 25 mg tablet Take 0.5 tablets (12.5 mg total) by mouth daily. 90 tablet 1   • L. acidophilus/pectin, citrus (ACIDOPHILUS PROBIOTIC ORAL) Take 0.5 mg by mouth.     • Lactobacillus acidophilus (PROBIOTIC ORAL) Take by mouth daily.      • levonorgestreL (LILETTA) 18.6 mcg/24 hrs (6 yrs) 52 mg intrauterine device intrauterine device 1 kit by intrauterine route once.     • loratadine (CLARITIN) 10 mg tablet Take 10 mg by mouth daily.     • metFORMIN XR (GLUCOPHAGE-XR) 750 mg 24 hr tablet Take 2 tablets (1,500 mg total) by mouth daily with dinner. 180 tablet 1   • omeprazole (PriLOSEC) 40 mg capsule Take 1 capsule (40 mg total) by mouth daily before breakfast. 90 capsule 1   • rosuvastatin (CRESTOR) 5 mg tablet Take 1 tablet (5 mg total) by mouth daily. 90 tablet 3   • spironolactone (ALDACTONE) 100 mg tablet TAKE 1 TABLET DAILY 90 tablet 3     No current facility-administered medications on file prior to visit.         Neomycin, Losartan, Neomycin-bacitracnzn-polymyxnb, Penicillins, Latex, and Miconazole       PATIENT-LED GOALS     Overweight  Today's wt: 169# / 1317 REE / 30.7 BMI / 36.7% BF / 46% H2O     Macro goals discussed: 1200 calories /  grams protein (35%) / 40 grams fat (35%) / <90 grams carb (30%)   -Aim for high fiber -high fiber food items include vegetables and fruit, dayne seeds/nuts, avocado   -Supplements discussed: [employer], pill form of Benefiber/Metameucil     Recommend food logging in paper or notes tiff vs. [employer] tiff   -identify if/when feeling Acid Reflux symptoms    Food timing goal: Aim for 12 hour fast maxiumum   -experiment with 1 24 hour fast 1 day per week, ok to consume up to 1 500 calorie meal on fasting day             Yasemin Pereyra RD, LDN  Registered Dietitian for [facility] Comprehensive Weight and Wellness Program  6/18/2024     Thank you very much for allowing us to participate in the care of your patient. Please do not hesitate to call or email if there are any questions.

## 2024-06-18 NOTE — PATIENT INSTRUCTIONS
Problem List Items Addressed This Visit       Overweight     Today's wt: 169# / 1317 REE / 30.7 BMI / 36.7% BF / 46% H2O     Macro goals discussed: 1200 calories /  grams protein (35%) / 40 grams fat (35%) / <90 grams carb (30%)   -Aim for high fiber -high fiber food items include vegetables and fruit, dayne seeds/nuts, avocado   -Supplements discussed: [employer], pill form of Benefiber/Metameucil     Recommend food logging in paper or notes tiff vs. Baritastic tiff   -identify if/when feeling Acid Reflux symptoms    Food timing goal: Aim for 12 hour fast maxiumum   -experiment with 1 24 hour fast 1 day per week, ok to consume up to 1 500 calorie meal on fasting day              Lipedema    Dietary counseling    Obesity (BMI 30-39.9) - Primary

## 2024-06-19 ENCOUNTER — APPOINTMENT (RX ONLY)
Dept: URBAN - METROPOLITAN AREA CLINIC 374 | Facility: CLINIC | Age: 54
Setting detail: DERMATOLOGY
End: 2024-06-19

## 2024-06-19 DIAGNOSIS — L23.9 ALLERGIC CONTACT DERMATITIS, UNSPECIFIED CAUSE: ICD-10-CM

## 2024-06-19 DIAGNOSIS — L70.0 ACNE VULGARIS: ICD-10-CM | Status: STABLE

## 2024-06-19 DIAGNOSIS — L20.89 OTHER ATOPIC DERMATITIS: ICD-10-CM | Status: STABLE

## 2024-06-19 PROCEDURE — ? PRESCRIPTION SAMPLES PROVIDED

## 2024-06-19 PROCEDURE — ? PRESCRIPTION MEDICATION MANAGEMENT

## 2024-06-19 PROCEDURE — ? PRESCRIPTION

## 2024-06-19 PROCEDURE — ? COUNSELING

## 2024-06-19 PROCEDURE — 99214 OFFICE O/P EST MOD 30 MIN: CPT

## 2024-06-19 RX ORDER — SULFACETAMIDE SODIUM AND SULFUR 9; 4.5 MG/G; MG/G
RINSE TOPICAL
Qty: 454 | Refills: 5 | Status: ERX | COMMUNITY
Start: 2024-06-19

## 2024-06-19 RX ADMIN — SULFACETAMIDE SODIUM AND SULFUR: 9; 4.5 RINSE TOPICAL at 00:00

## 2024-06-19 ASSESSMENT — LOCATION SIMPLE DESCRIPTION DERM
LOCATION SIMPLE: ABDOMEN
LOCATION SIMPLE: RIGHT HAND
LOCATION SIMPLE: LEFT CHEEK

## 2024-06-19 ASSESSMENT — LOCATION DETAILED DESCRIPTION DERM
LOCATION DETAILED: RIGHT ULNAR DORSAL HAND
LOCATION DETAILED: SUBXIPHOID
LOCATION DETAILED: LEFT INFERIOR CENTRAL MALAR CHEEK

## 2024-06-19 ASSESSMENT — LOCATION ZONE DERM
LOCATION ZONE: TRUNK
LOCATION ZONE: FACE
LOCATION ZONE: HAND

## 2024-06-19 NOTE — PROCEDURE: COUNSELING
Detail Level: Zone
Spironolactone Pregnancy And Lactation Text: This medication can cause feminization of the male fetus and should be avoided during pregnancy. The active metabolite is also found in breast milk.
Doxycycline Counseling:  Patient counseled regarding possible photosensitivity and increased risk for sunburn.  Patient instructed to avoid sunlight, if possible.  When exposed to sunlight, patients should wear protective clothing, sunglasses, and sunscreen.  The patient was instructed to call the office immediately if the following severe adverse effects occur:  hearing changes, easy bruising/bleeding, severe headache, or vision changes.  The patient verbalized understanding of the proper use and possible adverse effects of doxycycline.  All of the patient's questions and concerns were addressed.
Tazorac Counseling:  Patient advised that medication is irritating and drying.  Patient may need to apply sparingly and wash off after an hour before eventually leaving it on overnight.  The patient verbalized understanding of the proper use and possible adverse effects of tazorac.  All of the patient's questions and concerns were addressed.
Sarecycline Pregnancy And Lactation Text: This medication is Pregnancy Category D and not consider safe during pregnancy. It is also excreted in breast milk.
Topical Sulfur Applications Counseling: Topical Sulfur Counseling: Patient counseled that this medication may cause skin irritation or allergic reactions.  In the event of skin irritation, the patient was advised to reduce the amount of the drug applied or use it less frequently.   The patient verbalized understanding of the proper use and possible adverse effects of topical sulfur application.  All of the patient's questions and concerns were addressed.
Isotretinoin Counseling: Patient should get monthly blood tests, not donate blood, not drive at night if vision affected, not share medication, and not undergo elective surgery for 6 months after tx completed. Side effects reviewed, pt to contact office should one occur.
Erythromycin Counseling:  I discussed with the patient the risks of erythromycin including but not limited to GI upset, allergic reaction, drug rash, diarrhea, increase in liver enzymes, and yeast infections.
Topical Clindamycin Counseling: Patient counseled that this medication may cause skin irritation or allergic reactions.  In the event of skin irritation, the patient was advised to reduce the amount of the drug applied or use it less frequently.   The patient verbalized understanding of the proper use and possible adverse effects of clindamycin.  All of the patient's questions and concerns were addressed.
Isotretinoin Pregnancy And Lactation Text: This medication is Pregnancy Category X and is considered extremely dangerous during pregnancy. It is unknown if it is excreted in breast milk.
Topical Sulfur Applications Pregnancy And Lactation Text: This medication is Pregnancy Category C and has an unknown safety profile during pregnancy. It is unknown if this topical medication is excreted in breast milk.
Benzoyl Peroxide Counseling: Patient counseled that medicine may cause skin irritation and bleach clothing.  In the event of skin irritation, the patient was advised to reduce the amount of the drug applied or use it less frequently.   The patient verbalized understanding of the proper use and possible adverse effects of benzoyl peroxide.  All of the patient's questions and concerns were addressed.
Bactrim Counseling:  I discussed with the patient the risks of sulfa antibiotics including but not limited to GI upset, allergic reaction, drug rash, diarrhea, dizziness, photosensitivity, and yeast infections.  Rarely, more serious reactions can occur including but not limited to aplastic anemia, agranulocytosis, methemoglobinemia, blood dyscrasias, liver or kidney failure, lung infiltrates or desquamative/blistering drug rashes.
Birth Control Pills Counseling: Birth Control Pill Counseling: I discussed with the patient the potential side effects of OCPs including but not limited to increased risk of stroke, heart attack, thrombophlebitis, deep venous thrombosis, hepatic adenomas, breast changes, GI upset, headaches, and depression.  The patient verbalized understanding of the proper use and possible adverse effects of OCPs. All of the patient's questions and concerns were addressed.
Azelaic Acid Counseling: Patient counseled that medicine may cause skin irritation and to avoid applying near the eyes.  In the event of skin irritation, the patient was advised to reduce the amount of the drug applied or use it less frequently.   The patient verbalized understanding of the proper use and possible adverse effects of azelaic acid.  All of the patient's questions and concerns were addressed.
Erythromycin Pregnancy And Lactation Text: This medication is Pregnancy Category B and is considered safe during pregnancy. It is also excreted in breast milk.
Dapsone Counseling: I discussed with the patient the risks of dapsone including but not limited to hemolytic anemia, agranulocytosis, rashes, methemoglobinemia, kidney failure, peripheral neuropathy, headaches, GI upset, and liver toxicity.  Patients who start dapsone require monitoring including baseline LFTs and weekly CBCs for the first month, then every month thereafter.  The patient verbalized understanding of the proper use and possible adverse effects of dapsone.  All of the patient's questions and concerns were addressed.
Topical Retinoid counseling:  Patient advised to apply a pea-sized amount only at bedtime and wait 30 minutes after washing their face before applying.  If too drying, patient may add a non-comedogenic moisturizer. The patient verbalized understanding of the proper use and possible adverse effects of retinoids.  All of the patient's questions and concerns were addressed.
Winlevi Pregnancy And Lactation Text: This medication is considered safe during pregnancy and breastfeeding.
High Dose Vitamin A Pregnancy And Lactation Text: High dose vitamin A therapy is contraindicated during pregnancy and breast feeding.
Tazorac Pregnancy And Lactation Text: This medication is not safe during pregnancy. It is unknown if this medication is excreted in breast milk.
Dapsone Pregnancy And Lactation Text: This medication is Pregnancy Category C and is not considered safe during pregnancy or breast feeding.
Doxycycline Pregnancy And Lactation Text: This medication is Pregnancy Category D and not consider safe during pregnancy. It is also excreted in breast milk but is considered safe for shorter treatment courses.
Spironolactone Counseling: Patient advised regarding risks of diarrhea, abdominal pain, hyperkalemia, birth defects (for female patients), liver toxicity and renal toxicity. The patient may need blood work to monitor liver and kidney function and potassium levels while on therapy. The patient verbalized understanding of the proper use and possible adverse effects of spironolactone.  All of the patient's questions and concerns were addressed.
Azithromycin Counseling:  I discussed with the patient the risks of azithromycin including but not limited to GI upset, allergic reaction, drug rash, diarrhea, and yeast infections.
Use Enhanced Medication Counseling?: No
Topical Clindamycin Pregnancy And Lactation Text: This medication is Pregnancy Category B and is considered safe during pregnancy. It is unknown if it is excreted in breast milk.
Tetracycline Counseling: Patient counseled regarding possible photosensitivity and increased risk for sunburn.  Patient instructed to avoid sunlight, if possible.  When exposed to sunlight, patients should wear protective clothing, sunglasses, and sunscreen.  The patient was instructed to call the office immediately if the following severe adverse effects occur:  hearing changes, easy bruising/bleeding, severe headache, or vision changes.  The patient verbalized understanding of the proper use and possible adverse effects of tetracycline.  All of the patient's questions and concerns were addressed. Patient understands to avoid pregnancy while on therapy due to potential birth defects.
Aklief counseling:  Patient advised to apply a pea-sized amount only at bedtime and wait 30 minutes after washing their face before applying.  If too drying, patient may add a non-comedogenic moisturizer.  The most commonly reported side effects including irritation, redness, scaling, dryness, stinging, burning, itching, and increased risk of sunburn.  The patient verbalized understanding of the proper use and possible adverse effects of retinoids.  All of the patient's questions and concerns were addressed.
Azelaic Acid Pregnancy And Lactation Text: This medication is considered safe during pregnancy and breast feeding.
Bactrim Pregnancy And Lactation Text: This medication is Pregnancy Category D and is known to cause fetal risk.  It is also excreted in breast milk.
High Dose Vitamin A Counseling: Side effects reviewed, pt to contact office should one occur.
Winlevi Counseling:  I discussed with the patient the risks of topical clascoterone including but not limited to erythema, scaling, itching, and stinging. Patient voiced their understanding.
Azithromycin Pregnancy And Lactation Text: This medication is considered safe during pregnancy and is also secreted in breast milk.
Aklief Pregnancy And Lactation Text: It is unknown if this medication is safe to use during pregnancy.  It is unknown if this medication is excreted in breast milk.  Breastfeeding women should use the topical cream on the smallest area of the skin for the shortest time needed while breastfeeding.  Do not apply to nipple and areola.
Birth Control Pills Pregnancy And Lactation Text: This medication should be avoided if pregnant and for the first 30 days post-partum.
Topical Retinoid Pregnancy And Lactation Text: This medication is Pregnancy Category C. It is unknown if this medication is excreted in breast milk.
Sarecycline Counseling: Patient advised regarding possible photosensitivity and discoloration of the teeth, skin, lips, tongue and gums.  Patient instructed to avoid sunlight, if possible.  When exposed to sunlight, patients should wear protective clothing, sunglasses, and sunscreen.  The patient was instructed to call the office immediately if the following severe adverse effects occur:  hearing changes, easy bruising/bleeding, severe headache, or vision changes.  The patient verbalized understanding of the proper use and possible adverse effects of sarecycline.  All of the patient's questions and concerns were addressed.
Benzoyl Peroxide Pregnancy And Lactation Text: This medication is Pregnancy Category C. It is unknown if benzoyl peroxide is excreted in breast milk.
Minocycline Counseling: Patient advised regarding possible photosensitivity and discoloration of the teeth, skin, lips, tongue and gums.  Patient instructed to avoid sunlight, if possible.  When exposed to sunlight, patients should wear protective clothing, sunglasses, and sunscreen.  The patient was instructed to call the office immediately if the following severe adverse effects occur:  hearing changes, easy bruising/bleeding, severe headache, or vision changes.  The patient verbalized understanding of the proper use and possible adverse effects of minocycline.  All of the patient's questions and concerns were addressed.
Detail Level: Detailed

## 2024-06-19 NOTE — PROCEDURE: PRESCRIPTION MEDICATION MANAGEMENT
Render In Strict Bullet Format?: No
Discontinue Regimen: triamcinolone acetonide 0.1 % topical cream BID, apply twice daily to affected areas of hands twice daily for up to 2 weeks/ month. Do not use more than 2 weeks per month
Continue Regimen: Eucrisa 2% topical ointment: apply a thin layer to eczema of the hands BID PRN flares
Detail Level: Zone
Plan: will send to [id number] if not covered at local
Continue Regimen: sulfacetamide sodium-sulfur 9%-4.5% topical cleanser: Use to wash face Qday
Initiate Treatment: triamcinolone acetonide 0.1% topical cream: apply twice daily to affected areas twice daily for up to 2 weeks/ month. Do not use more than 2 weeks per month (prescribed previously)

## 2024-08-20 RX ORDER — BUPROPION HYDROCHLORIDE 300 MG/1
TABLET ORAL
Qty: 90 TABLET | Refills: 3 | Status: SHIPPED | OUTPATIENT
Start: 2024-08-20

## 2024-08-20 RX ORDER — BUPROPION HYDROCHLORIDE 150 MG/1
TABLET ORAL
Qty: 90 TABLET | Refills: 3 | Status: SHIPPED | OUTPATIENT
Start: 2024-08-20

## 2024-08-20 NOTE — TELEPHONE ENCOUNTER
Medicine Refill Request    Last Office Visit: 5/16/2024   Last Consult Visit: Visit date not found  Last Telemedicine Visit: 1/12/2024 Arabella Johnson CRNP    Next Appointment: Visit date not found      Current Outpatient Medications:     BIOTIN ORAL, Take 5,000 mg by mouth daily., Disp: , Rfl:     buPROPion XL (WELLBUTRIN XL) 150 mg 24 hr tablet, TAKE 1 TABLET DAILY. TAKE WITH 300 MG TABLETS (TOTAL 450 MG), Disp: 90 tablet, Rfl: 3    buPROPion XL (WELLBUTRIN XL) 300 mg 24 hr tablet, TAKE 1 TABLET DAILY. TAKE WITH 150 MG TABLETS (TOTAL 450 MG), Disp: 90 tablet, Rfl: 3    celecoxib (CeleBREX) 100 mg capsule, Take 1 capsule (100 mg total) by mouth 2 (two) times a day., Disp: 60 capsule, Rfl: 1    chromium picolinate 200 mcg tablet, Take by mouth., Disp: , Rfl:     clioquinol-hydrocortisone (ALA-GILLES) 3-0.5 % cream, External, Disp: , Rfl:     crisaborole (EUCRISA) 2 % ointment, Eucrisa 2 % topical ointment, Disp: , Rfl:     famotidine (PEPCID) 40 mg tablet, Take 1 tablet (40 mg total) by mouth 2 (two) times a day., Disp: 180 tablet, Rfl: 1    fluconazole (DIFLUCAN) 150 mg tablet, Take one pill once. If symptoms do not resolve take another pill 48 hours after first pill., Disp: 2 tablet, Rfl: 0    hydrochlorothiazide (HYDRODIURIL) 25 mg tablet, Take 0.5 tablets (12.5 mg total) by mouth daily., Disp: 90 tablet, Rfl: 1    L. acidophilus/pectin, citrus (ACIDOPHILUS PROBIOTIC ORAL), Take 0.5 mg by mouth., Disp: , Rfl:     Lactobacillus acidophilus (PROBIOTIC ORAL), Take by mouth daily., Disp: , Rfl:     levonorgestreL (LILETTA) 18.6 mcg/24 hrs (6 yrs) 52 mg intrauterine device intrauterine device, 1 kit by intrauterine route once., Disp: , Rfl:     loratadine (CLARITIN) 10 mg tablet, Take 10 mg by mouth daily., Disp: , Rfl:     metFORMIN XR (GLUCOPHAGE-XR) 750 mg 24 hr tablet, Take 2 tablets (1,500 mg total) by mouth daily with dinner., Disp: 180 tablet, Rfl: 1    omeprazole (PriLOSEC) 40 mg capsule, Take 1 capsule (40  "mg total) by mouth daily before breakfast., Disp: 90 capsule, Rfl: 1    rosuvastatin (CRESTOR) 5 mg tablet, Take 1 tablet (5 mg total) by mouth daily., Disp: 90 tablet, Rfl: 3    spironolactone (ALDACTONE) 100 mg tablet, TAKE 1 TABLET DAILY, Disp: 90 tablet, Rfl: 3      BP Readings from Last 3 Encounters:   05/16/24 112/80   03/07/24 136/84   02/27/24 122/82       Recent Lab results:  Lab Results   Component Value Date    CHOL 165 02/28/2024   ,   Lab Results   Component Value Date    HDL 62 02/28/2024   ,   Lab Results   Component Value Date    LDLCALC 85 02/28/2024   ,   Lab Results   Component Value Date    TRIG 102 02/28/2024        Lab Results   Component Value Date    GLUCOSE 76 02/28/2024   , No results found for: \"HGBA1C\"      Lab Results   Component Value Date    CREATININE 0.76 02/28/2024       Lab Results   Component Value Date    TSH 3.300 02/28/2024         No results found for: \"HGBA1C\"  "

## 2024-09-05 ENCOUNTER — TRANSCRIBE ORDERS (OUTPATIENT)
Dept: SCHEDULING | Age: 54
End: 2024-09-05

## 2024-09-05 DIAGNOSIS — R10.11 RIGHT UPPER QUADRANT PAIN: Primary | ICD-10-CM

## 2024-09-10 ENCOUNTER — HOSPITAL ENCOUNTER (OUTPATIENT)
Dept: RADIOLOGY | Facility: HOSPITAL | Age: 54
Discharge: HOME | End: 2024-09-10
Attending: INTERNAL MEDICINE
Payer: COMMERCIAL

## 2024-09-10 DIAGNOSIS — R10.11 RIGHT UPPER QUADRANT PAIN: ICD-10-CM

## 2024-09-10 PROCEDURE — 25500000 HC DRUGS/INCIDENT RAD: Performed by: INTERNAL MEDICINE

## 2024-09-10 PROCEDURE — 74246 X-RAY XM UPR GI TRC 2CNTRST: CPT

## 2024-09-10 RX ADMIN — BARIUM SULFATE 176 G: 960 POWDER, FOR SUSPENSION ORAL at 09:17

## 2024-09-21 ENCOUNTER — NURSE TRIAGE (OUTPATIENT)
Dept: FAMILY MEDICINE | Facility: CLINIC | Age: 54
End: 2024-09-21
Payer: COMMERCIAL

## 2024-09-21 NOTE — TELEPHONE ENCOUNTER
Synopsis:    Patient reports that she tested positive for COVID today.   Started with nasal congestion, runny nose, PND, ear pain that started 2 days ago.   Temp 99.0  Today c/o sweats.   Denies cough or SOB.   Has been taking sudafed, ibuprofen and Flonase.     PAXLOVID Patient Eligibility Screening Checklist     If the answer to ALL points is Yes, patient considered eligible for Paxlovid prescription.     Positive SARS-CoV-2 test (Confirmation of a positive home rapid SARS-CoV-2 test result with additional direct SARS-CoV-2 viral testing is not required.) yes    Age >/= 18 years OR > 12 years of age and weighing at least 40 kg yes       Has one or more risk factors for progression to severe COVID-19 (Risk factors have changed over time, and additional risk factors [such as being unvaccinated or having not received a booster] could be considered. Healthcare providers should consider the benefit-risk for an individual patient. yes     Symptoms consistent with mild to moderate COVID-19 yes    Symptom onset within 5 days (Prescriber is encouraged to include a note to the pharmacist in the prescription stating: Please fill prescription by [insert date]. This prescription fill by date is within 5 days from symptom onset and complies with the patient eligibility criteria under the EUA.) yes    Not requiring hospitalization due to severe or critical COVID-19 at treatment initiation yes    No known or suspected severe renal impairment (eGFR < 30 mL/min) yes  eGFR   Date Value Ref Range Status   02/28/2024 94 >59 mL/min/1.73 Final     Note that a dose reduction is required for patients with moderate renal impairment (eGFR >/= 30-<60 mL/min); see the Fact Sheet for Healthcare Providers.  Prescriber may rely on patient history and access to the patient's health records to make an assessment regarding the likelihood of renal impairment. Providers may consider ordering a serum creatinine or calculating the estimated glomerular  filtration rate (eGFR) for certain patients after assessment on a case-by-case basis based on history or exam.    No known or suspected severe hepatic impairment (Child-Niño Class C) yes    No history of clinically significant hypersensitivity reactions [e.g., toxic epidermal necrolysis (TEN) or Morris-Remi syndrome] to the active ingredients (nirmatrelvir or ritonavir) or other components of the product yes    Additional Considerations if prescribing Paxlovid:    Concomitant medications reviewed for potential interactions including but not limited to the considerations below (YES/NO:[id number])  HMG-CoA reductase inhibitors (statins)   Patient is taking lovastatin or simvastatin, which are contraindicated with PAXLOVID coadministration: The statin can be held 12 hours prior to the first dose of PAXLOVID treatment, held during the 5 days of treatment, and restarted 5 days after completing PAXLOVID.  Patient is taking atorvastatin or rosuvastatin: Temporary discontinuation of atorvastatin and rosuvastatin during treatment with PAXLOVID should be considered depending on statin dose. Atorvastatin and rosuvastatin do not need to be held prior to or after completing PAXLOVID.  Hormonal contraceptives containing ethinyl estradiol: Patient is taking a hormonal contraceptive containing ethinyl estradiol:   The need for an additional non-hormonal method of contraception during the 5 days of PAXLOVID treatment and until one menstrual cycle after stopping PAXLOVID should be recommended.   Medications for HIV-1 Treatment: Patient is taking medications for the treatment of HIV-1 infection:   With the exception of [id number], HIV antiretroviral medications can be co-administered with PAXLOVID without dose adjustment, but arranging follow-up by the HIV care provider to monitor for side effects is recommended    Potential side effects including altered sense of taste, diarrhea, increased blood pressure and muscle aches reviewed with  patient yes    The patient is not taking any medications that are contraindicated with Paxlovid coadministration yes    If the answer to ALL points is Yes, patient considered eligible for Paxlovid prescription.     Patient meets criteria for Paxlovid, communicates understanding of the risk and benefits and prescription [facility].     PAXLOVID Patient Eligibility Screening Checklist Tool for Prescribers      Disposition:    Educated patient on potential risk, benefits and side effects regarding Paxlovid.   Patient refused to started medication at this time after information received.   Patient aware that medication must be started by Day 5 from when symptoms started in order to to begin Paxlovid medication.  Patient will continue with supportive care and monitor symptoms.    Patient verbalized understanding and reports that she will call next week if needed.    Information or Advice Only Call  Care Advice:  Care Advice Given       No Care Advice given for this encounter.             Orders Placed This Encounter:  No orders of the defined types were placed in this encounter.

## 2024-09-23 ENCOUNTER — TELEPHONE (OUTPATIENT)
Dept: INTERNAL MEDICINE | Facility: CLINIC | Age: 54
End: 2024-09-23
Payer: COMMERCIAL

## 2024-09-23 RX ORDER — METFORMIN HYDROCHLORIDE 750 MG/1
TABLET, EXTENDED RELEASE ORAL
Qty: 180 TABLET | Refills: 3 | Status: SHIPPED | OUTPATIENT
Start: 2024-09-23 | End: 2025-03-27

## 2024-09-23 RX ORDER — FLUTICASONE PROPIONATE 50 MCG
1 SPRAY, SUSPENSION (ML) NASAL DAILY
Qty: 16 G | Refills: 3 | Status: SHIPPED | OUTPATIENT
Start: 2024-09-23

## 2024-09-23 NOTE — TELEPHONE ENCOUNTER
Pt requesting prescription for Flonase to be sent to [facility]                  Medicine Refill Request    Last Office Visit: 5/16/2024   Last Consult Visit: Visit date not found  Last Telemedicine Visit: 1/12/2024 Arabella Johnson CRNP    Next Appointment: Visit date not found      Current Outpatient Medications:     BIOTIN ORAL, Take 5,000 mg by mouth daily., Disp: , Rfl:     buPROPion XL (WELLBUTRIN XL) 150 mg 24 hr tablet, TAKE 1 TABLET DAILY. TAKE WITH 300 MG TABLETS (TOTAL 450 MG), Disp: 90 tablet, Rfl: 3    buPROPion XL (WELLBUTRIN XL) 300 mg 24 hr tablet, TAKE 1 TABLET DAILY. TAKE WITH 150 MG TABLETS (TOTAL 450 MG), Disp: 90 tablet, Rfl: 3    celecoxib (CeleBREX) 100 mg capsule, Take 1 capsule (100 mg total) by mouth 2 (two) times a day., Disp: 60 capsule, Rfl: 1    chromium picolinate 200 mcg tablet, Take by mouth., Disp: , Rfl:     clioquinol-hydrocortisone (ALA-GILLES) 3-0.5 % cream, External, Disp: , Rfl:     crisaborole (EUCRISA) 2 % ointment, Eucrisa 2 % topical ointment, Disp: , Rfl:     famotidine (PEPCID) 40 mg tablet, Take 1 tablet (40 mg total) by mouth 2 (two) times a day., Disp: 180 tablet, Rfl: 1    fluconazole (DIFLUCAN) 150 mg tablet, Take one pill once. If symptoms do not resolve take another pill 48 hours after first pill., Disp: 2 tablet, Rfl: 0    hydrochlorothiazide (HYDRODIURIL) 25 mg tablet, Take 0.5 tablets (12.5 mg total) by mouth daily., Disp: 90 tablet, Rfl: 1    L. acidophilus/pectin, citrus (ACIDOPHILUS PROBIOTIC ORAL), Take 0.5 mg by mouth., Disp: , Rfl:     Lactobacillus acidophilus (PROBIOTIC ORAL), Take by mouth daily., Disp: , Rfl:     levonorgestreL (LILETTA) 18.6 mcg/24 hrs (6 yrs) 52 mg intrauterine device intrauterine device, 1 kit by intrauterine route once., Disp: , Rfl:     loratadine (CLARITIN) 10 mg tablet, Take 10 mg by mouth daily., Disp: , Rfl:     metFORMIN XR (GLUCOPHAGE-XR) 750 mg 24 hr tablet, Take 2 tablets (1,500 mg total) by  "mouth daily with dinner., Disp: 180 tablet, Rfl: 1    omeprazole (PriLOSEC) 40 mg capsule, Take 1 capsule (40 mg total) by mouth daily before breakfast., Disp: 90 capsule, Rfl: 1    rosuvastatin (CRESTOR) 5 mg tablet, Take 1 tablet (5 mg total) by mouth daily., Disp: 90 tablet, Rfl: 3    spironolactone (ALDACTONE) 100 mg tablet, TAKE 1 TABLET DAILY, Disp: 90 tablet, Rfl: 3      BP Readings from Last 3 Encounters:   05/16/24 112/80   03/07/24 136/84   02/27/24 122/82       Recent Lab results:  Lab Results   Component Value Date    CHOL 165 02/28/2024   ,   Lab Results   Component Value Date    HDL 62 02/28/2024   ,   Lab Results   Component Value Date    LDLCALC 85 02/28/2024   ,   Lab Results   Component Value Date    TRIG 102 02/28/2024        Lab Results   Component Value Date    GLUCOSE 76 02/28/2024   , No results found for: \"HGBA1C\"      Lab Results   Component Value Date    CREATININE 0.76 02/28/2024       Lab Results   Component Value Date    TSH 3.300 02/28/2024         No results found for: \"HGBA1C\"                              "

## 2024-09-27 ENCOUNTER — OFFICE VISIT (OUTPATIENT)
Dept: INTERNAL MEDICINE | Facility: CLINIC | Age: 54
End: 2024-09-27
Payer: COMMERCIAL

## 2024-09-27 VITALS
HEART RATE: 81 BPM | DIASTOLIC BLOOD PRESSURE: 74 MMHG | BODY MASS INDEX: 31.83 KG/M2 | OXYGEN SATURATION: 97 % | WEIGHT: 174 LBS | SYSTOLIC BLOOD PRESSURE: 110 MMHG | RESPIRATION RATE: 16 BRPM

## 2024-09-27 DIAGNOSIS — U07.1 COVID-19 VIRUS INFECTION: ICD-10-CM

## 2024-09-27 DIAGNOSIS — R61 EXCESSIVE SWEATING: Primary | ICD-10-CM

## 2024-09-27 PROCEDURE — 3008F BODY MASS INDEX DOCD: CPT | Performed by: INTERNAL MEDICINE

## 2024-09-27 PROCEDURE — 99214 OFFICE O/P EST MOD 30 MIN: CPT | Performed by: INTERNAL MEDICINE

## 2024-09-27 PROCEDURE — 3074F SYST BP LT 130 MM HG: CPT | Performed by: INTERNAL MEDICINE

## 2024-09-27 PROCEDURE — 3078F DIAST BP <80 MM HG: CPT | Performed by: INTERNAL MEDICINE

## 2024-09-27 RX ORDER — IPRATROPIUM BROMIDE 42 UG/1
1-2 SPRAY, METERED NASAL 4 TIMES DAILY PRN
Qty: 15 ML | Refills: 1 | Status: SHIPPED | OUTPATIENT
Start: 2024-09-27 | End: 2025-09-27

## 2024-09-27 ASSESSMENT — ENCOUNTER SYMPTOMS
DIFFICULTY URINATING: 0
DIZZINESS: 0
WHEEZING: 0
PALPITATIONS: 0
FEVER: 0
UNEXPECTED WEIGHT CHANGE: 0
SLEEP DISTURBANCE: 0
ADENOPATHY: 0
NERVOUS/ANXIOUS: 0
DYSPHORIC MOOD: 0
SHORTNESS OF BREATH: 0
ARTHRALGIAS: 1
HEADACHES: 0
LIGHT-HEADEDNESS: 0
COUGH: 0
ABDOMINAL PAIN: 0
DIAPHORESIS: 1

## 2024-09-27 NOTE — ASSESSMENT & PLAN NOTE
Unclear etiology.  Occurs only with exertion.  Could be physiologic, similar to her symptoms of vasomotor rhinitis.  Will check labs to rule out abnormalities.  Advised to make an appointment with her GYN to discuss possible hot flashes symptoms.  Also advised to make an appointment with dermatology for possible abnormalities of sweat glands in the head and neck.  If all testing normal, may consider possible endocrine issue versus medication side effects. Advised to stay well-hydrated

## 2024-09-27 NOTE — ASSESSMENT & PLAN NOTE
Tested positive a week ago.  Minimal residual symptoms.  Continue to stay hydrated.  Exam unremarkable.  Excessive sweating likely unrelated process   STG (3-5 sessions) sit to stand/ stand to sit Estela. bed to chair/ chair to bed Estela

## 2024-09-27 NOTE — PROGRESS NOTES
Subjective      Patient ID: Aurora Cedillo is a 54 y.o. female.    HPI    Patient presents with c/o excessive sweating.  Started noticing at the end of August. Would have sweating of the head and neck when she's running around and exerting herself, even mild activity.  No symptoms when at rest.  Occurs about 3 times a day. Stopping the activity makes it better within about 15 min and she dries out. No associated symptoms of palpitations, LH/dizziness, SOB/CP during episodes. Mild facial flushing.  Last visit with GYN was about a year ago and had IUD placement.    Recently had US guided steroid injection of the R.hip 2.5 weeks ago for trochanteric bursitis.  Took celebrex and did PT and had 2 earlier steroid injections in the spring/summer which did not help.   Had URI symptoms earlier last week on Monday progressing to loss of taste/smell. Took a covid test on Saturday, 9/21/24 and she was positive.  Did not have any fevers or chills. No changes to her sweating.  Has mild chest congestion but no cough.    Has longstanding history of vasomotor rhinitis usually triggered by temperature changes. Takes claritin every night and Flonase nasal spray as needed. Has not tried Atrovent.  Takes Wellbutrin 450mg for anxiety but has been stable on this dose for many year.  No other new/acute concerns.       The following have been reviewed and updated as appropriate in this visit:     Allergies  Meds  Problems         Past Medical History:   Diagnosis Date    Allergic rhinitis     Anxiety 5 years ago    Arthritis     Back pain     BMI 40.0-44.9, adult (CMS/[facility])     Cellulitis     COVID-19 virus infection 9/19/2022    Depression 06/28/2012    Dysfunction of both eustachian tubes     Essential hypertension, benign 06/28/2012    GERD (gastroesophageal reflux disease) After lap band    Gout 15-20 years ago    No longer have    High cholesterol 20 years ago    Hyperlipidemia     Irregular menses     Plantar fasciitis 20 years  ago    Right bundle branch block 2012    Sensorineural hearing loss, bilateral     Shingles     on head, age 20's    Sinusitis     Skin abnormalities All my life    Several skin issues some related to allergies, stress    Skin rash     Sleep apnea     Snoring     UTI (urinary tract infection)     Varicose veins of lower extremity     Vitamin D deficiency     Yeast infection      Past Surgical History   Procedure Laterality Date    Carpal tunnel release  25 years ago    Corrected with surgery    Colonoscopy      Endometrial biopsy      Knee arthroscopy  15 years ago    Knee surgery  7 years ago    Laparoscopic gastric banding  15 years ago?    Unsure of exact timeframe    Partial knee arthroplasty Left     Sinus surgery      Vaginal delivery       Family History   Problem Relation Name Age of Onset    Atrial fibrillation Biological Mother Aurora Benson     Anxiety disorder Biological Mother Aurora Benson     Atrial fibrillation Biological Father Chris Conteh     Stroke Biological Father Chris Conteh         with cognitive impairment    Heart disease Biological Father Chris Conteh     Hypertension Biological Father Chris Conteh     Mental illness Biological Father Chris Conteh     Obesity Biological Father Chris Conteh     Atrial fibrillation Biological Brother      Stroke Biological Brother           of CVA age 52    ADD / ADHD Biological Daughter      Anxiety disorder Biological Daughter      No Known Problems Biological Son oldest     Autism spectrum disorder Biological Son youngest         high functioning    ADD / ADHD Biological Son youngest         oldest, declines meds     Social History     Socioeconomic History    Marital status:      Spouse name: None    Number of children: 3    Years of education: None    Highest education level: None   Occupational History    Occupation: [employer]   Tobacco Use    Smoking status: Former     Current packs/day: 0.00     Average packs/day: 0.5 packs/day for 5.0  years (2.5 ttl pk-yrs)     Types: Cigarettes     Start date: 1988     Quit date: 1993     Years since quittin.7    Smokeless tobacco: Never   Vaping Use    Vaping status: Never Used   Substance and Sexual Activity    Alcohol use: Yes     Comment: once in a while    Drug use: Not Currently    Sexual activity: Yes     Partners: Male     Birth control/protection: Abstinence, Coitus interruptus/Pulling Out, Condom Male     Comment: Unable to take pill due to side effects.   Social History Narrative    Children- 2 sons (16, 10), 1 daughter (13). 2021    Caffeine - coffee 1 daily. Iced tea, unsweetened    Exercise- yoga , PT    Diet- intermittent fasting, weekdays    Pets- none. Dog .       Review of Systems   Constitutional:  Positive for diaphoresis. Negative for fever and unexpected weight change.   HENT:  Positive for congestion.    Eyes:  Negative for visual disturbance.   Respiratory:  Negative for cough, shortness of breath and wheezing.    Cardiovascular:  Negative for chest pain, palpitations and leg swelling.   Gastrointestinal:  Negative for abdominal pain.   Endocrine: Positive for heat intolerance.   Genitourinary:  Negative for difficulty urinating.   Musculoskeletal:  Positive for arthralgias (Right hip).   Neurological:  Negative for dizziness, light-headedness and headaches.   Hematological:  Negative for adenopathy.   Psychiatric/Behavioral:  Negative for dysphoric mood and sleep disturbance. The patient is not nervous/anxious.        Allergies   Allergen Reactions    Neomycin Hives    Losartan Other (see comments)     Fatigue    Neomycin-Bacitracnzn-Polymyxnb Itching    Penicillins     Latex Rash    Miconazole Rash     Current Outpatient Medications   Medication Sig Dispense Refill    BIOTIN ORAL Take 5,000 mg by mouth daily.      buPROPion XL (WELLBUTRIN XL) 150 mg 24 hr tablet TAKE 1 TABLET DAILY. TAKE WITH 300 MG TABLETS (TOTAL 450 MG) 90 tablet 3    buPROPion XL (WELLBUTRIN XL)  300 mg 24 hr tablet TAKE 1 TABLET DAILY. TAKE WITH 150 MG TABLETS (TOTAL 450 MG) 90 tablet 3    celecoxib (CeleBREX) 100 mg capsule Take 1 capsule (100 mg total) by mouth 2 (two) times a day. 60 capsule 1    chromium picolinate 200 mcg tablet Take by mouth.      crisaborole (EUCRISA) 2 % ointment Eucrisa 2 % topical ointment      fluticasone propionate (FLONASE) 50 mcg/actuation nasal spray Administer 1 spray into each nostril daily. 16 g 3    hydrochlorothiazide (HYDRODIURIL) 25 mg tablet Take 0.5 tablets (12.5 mg total) by mouth daily. 90 tablet 1    ipratropium (ATROVENT) 42 mcg (0.06 %) nasal spray Administer 1-2 sprays into each nostril 4 (four) times a day as needed for rhinitis. 15 mL 1    L. acidophilus/pectin, citrus (ACIDOPHILUS PROBIOTIC ORAL) Take 0.5 mg by mouth.      Lactobacillus acidophilus (PROBIOTIC ORAL) Take by mouth daily.      levonorgestreL (LILETTA) 18.6 mcg/24 hrs (6 yrs) 52 mg intrauterine device intrauterine device 1 kit by intrauterine route once.      metFORMIN XR (GLUCOPHAGE-XR) 750 mg 24 hr tablet TAKE 2 TABLETS DAILY WITH DINNER 180 tablet 3    omeprazole (PriLOSEC) 40 mg capsule Take 1 capsule (40 mg total) by mouth daily before breakfast. 90 capsule 1    rosuvastatin (CRESTOR) 5 mg tablet Take 1 tablet (5 mg total) by mouth daily. 90 tablet 3    spironolactone (ALDACTONE) 100 mg tablet TAKE 1 TABLET DAILY 90 tablet 3    clioquinol-hydrocortisone (ALA-GILLES) 3-0.5 % cream External      famotidine (PEPCID) 40 mg tablet Take 1 tablet (40 mg total) by mouth 2 (two) times a day. 180 tablet 1    fluconazole (DIFLUCAN) 150 mg tablet Take one pill once. If symptoms do not resolve take another pill 48 hours after first pill. 2 tablet 0    loratadine (CLARITIN) 10 mg tablet Take 10 mg by mouth daily.       No current facility-administered medications for this visit.       Objective   Vitals:    09/27/24 1117   BP: 110/74   Pulse: 81   Resp: 16   SpO2: 97%   Weight: 78.9 kg (174 lb)     Body  mass index is 31.83 kg/m².    Physical Exam  Constitutional:       Appearance: Normal appearance. She is well-developed.   HENT:      Head: Normocephalic and atraumatic.      Right Ear: Tympanic membrane, ear canal and external ear normal.      Left Ear: Tympanic membrane, ear canal and external ear normal.      Nose: Nose normal.      Mouth/Throat:      Mouth: Mucous membranes are moist.      Pharynx: Oropharynx is clear. Uvula midline.   Eyes:      General: Lids are normal.   Cardiovascular:      Rate and Rhythm: Normal rate and regular rhythm.      Heart sounds: Normal heart sounds, S1 normal and S2 normal. No murmur heard.  Pulmonary:      Effort: Pulmonary effort is normal. No respiratory distress.      Breath sounds: Normal breath sounds. No decreased breath sounds, rhonchi or rales.   Abdominal:      General: Bowel sounds are normal.      Palpations: Abdomen is soft.      Tenderness: There is no abdominal tenderness.   Musculoskeletal:      Cervical back: Normal range of motion and neck supple.   Skin:     General: Skin is warm and dry.   Neurological:      General: No focal deficit present.      Mental Status: She is alert and oriented to person, place, and time.      Cranial Nerves: No cranial nerve deficit.      Gait: Gait normal.   Psychiatric:         Mood and Affect: Mood normal.         Behavior: Behavior normal.         Thought Content: Thought content normal.         Judgment: Judgment normal.         Assessment/Plan   Problem List Items Addressed This Visit          Infectious/Inflammatory    COVID-19 virus infection     Tested positive a week ago.  Minimal residual symptoms.  Continue to stay hydrated.  Exam unremarkable.  Excessive sweating likely unrelated process            Dermatologic    Excessive sweating - Primary     Unclear etiology.  Occurs only with exertion.  Could be physiologic, similar to her symptoms of vasomotor rhinitis.  Will check labs to rule out abnormalities.  Advised to make  an appointment with her GYN to discuss possible hot flashes symptoms.  Also advised to make an appointment with dermatology for possible abnormalities of sweat glands in the head and neck.  If all testing normal, may consider possible endocrine issue versus medication side effects. Advised to stay well-hydrated         Relevant Orders    CBC and Differential    Comprehensive metabolic panel    TSH w reflex FT4    Sedimentation rate    Tryptase    TARA, IFA    C-reactive protein    Lyme Disease Antibodies (IgG, IgM),       Caren Guardado MD    9/27/2024

## 2024-09-28 LAB
ALBUMIN SERPL-MCNC: 4.6 G/DL (ref 3.6–5.1)
ALBUMIN/GLOB SERPL: 1.6 (CALC) (ref 1–2.5)
ALP SERPL-CCNC: 71 U/L (ref 37–153)
ALT SERPL-CCNC: 14 U/L (ref 6–29)
AST SERPL-CCNC: 16 U/L (ref 10–35)
BASOPHILS # BLD AUTO: 33 CELLS/UL (ref 0–200)
BASOPHILS NFR BLD AUTO: 0.3 %
BILIRUB SERPL-MCNC: 0.6 MG/DL (ref 0.2–1.2)
BUN SERPL-MCNC: 16 MG/DL (ref 7–25)
BUN/CREAT SERPL: 14 (CALC) (ref 6–22)
CALCIUM SERPL-MCNC: 9.9 MG/DL (ref 8.6–10.4)
CHLORIDE SERPL-SCNC: 98 MMOL/L (ref 98–110)
CO2 SERPL-SCNC: 25 MMOL/L (ref 20–32)
CREAT SERPL-MCNC: 1.14 MG/DL (ref 0.5–1.03)
EGFRCR SERPLBLD CKD-EPI 2021: 57 ML/MIN/1.73M2
EOSINOPHIL # BLD AUTO: 89 CELLS/UL (ref 15–500)
EOSINOPHIL NFR BLD AUTO: 0.8 %
ERYTHROCYTE [DISTWIDTH] IN BLOOD BY AUTOMATED COUNT: 11.9 % (ref 11–15)
GLOBULIN SER CALC-MCNC: 2.9 G/DL (CALC) (ref 1.9–3.7)
GLUCOSE SERPL-MCNC: 75 MG/DL (ref 65–139)
HCT VFR BLD AUTO: 49.6 % (ref 35–45)
HGB BLD-MCNC: 16.2 G/DL (ref 11.7–15.5)
LYMPHOCYTES # BLD AUTO: 2142 CELLS/UL (ref 850–3900)
LYMPHOCYTES NFR BLD AUTO: 19.3 %
MCH RBC QN AUTO: 29.6 PG (ref 27–33)
MCHC RBC AUTO-ENTMCNC: 32.7 G/DL (ref 32–36)
MCV RBC AUTO: 90.7 FL (ref 80–100)
MONOCYTES # BLD AUTO: 722 CELLS/UL (ref 200–950)
MONOCYTES NFR BLD AUTO: 6.5 %
NEUTROPHILS # BLD AUTO: 8114 CELLS/UL (ref 1500–7800)
NEUTROPHILS NFR BLD AUTO: 73.1 %
PLATELET # BLD AUTO: 373 THOUSAND/UL (ref 140–400)
PMV BLD REES-ECKER: 10.5 FL (ref 7.5–12.5)
POTASSIUM SERPL-SCNC: 4.5 MMOL/L (ref 3.5–5.3)
PROT SERPL-MCNC: 7.5 G/DL (ref 6.1–8.1)
RBC # BLD AUTO: 5.47 MILLION/UL (ref 3.8–5.1)
SODIUM SERPL-SCNC: 136 MMOL/L (ref 135–146)
TSH SERPL-ACNC: 2.91 MIU/L
WBC # BLD AUTO: 11.1 THOUSAND/UL (ref 3.8–10.8)

## 2024-10-02 ENCOUNTER — TELEPHONE (OUTPATIENT)
Dept: INTERNAL MEDICINE | Facility: CLINIC | Age: 54
End: 2024-10-02
Payer: COMMERCIAL

## 2024-10-02 DIAGNOSIS — R61 EXCESSIVE SWEATING: ICD-10-CM

## 2024-10-02 DIAGNOSIS — R06.02 SOB (SHORTNESS OF BREATH): Primary | ICD-10-CM

## 2024-10-02 DIAGNOSIS — R79.89 ELEVATED SERUM CREATININE: ICD-10-CM

## 2024-10-02 LAB
ANA SER QL IF: NEGATIVE
B BURGDOR AB SER IA-ACNC: <0.9 INDEX
CRP SERPL-MCNC: <3 MG/L
ERYTHROCYTE [SEDIMENTATION RATE] IN BLOOD BY WESTERGREN METHOD: 2 MM/H
TRYPTASE SERPL-MCNC: 2.2 MCG/L

## 2024-10-02 RX ORDER — DOXYCYCLINE HYCLATE 100 MG
100 TABLET ORAL 2 TIMES DAILY
Qty: 14 TABLET | Refills: 0 | Status: SHIPPED | OUTPATIENT
Start: 2024-10-02 | End: 2024-10-09

## 2024-10-02 NOTE — LETTER
[facility] HEALTH LAB REQUISITION  [facility] [address] [facility] [address]  Phone:  [phone]  Fax:  [phone]    [facility] Account Number - [id number]  [facility] Account Number - [id number]      Patient:    Darion Arenas MRN:  [id number]   [address] [facility] [address] :  1970  Sex:  F   Phone: [phone]      INSURANCE PAYOR PLAN GROUP # SUBSCRIBER ID   Primary:   [employer] [id number]  Insurance: N/A  Plan Name: N/A [id number]     Order Date:  Oct 2, 2024             X-RAY CHEST 2 VIEWS  CPT: 58709   (Order ID: [id number])   Diagnosis:  SOB (shortness of breath) (R06.02)   Priority:  Routine Expiration Date:  10/2/2025 Interval:   Count:    Release to patient: Immediate [1]                       Authorized by: Caren Guardado MD   (NPI: [id number])    E-Signature: Caren Guardado MD                       To schedule radiology appointments with [facility]     call Central Scheduling at [phone]  To schedule PET scans call PET Scheduling [phone]    To schedule Low-Dose CT Scan for Lung Cancer Screening  call [phone]        Lab draws do not require an appointment               [facility] Page  of    Account #: [id number] Collection Date:         Req/Control #: [id number] Collection Time:            Client / Ordering Site Information: Physician Information:   Account Name: [facility] [place]   Address 1: [address] [place] [address]   Address 2:    [facility] Zip: [place] [address]   Phone: [phone]    Ordering: Caren Guardado   Degree: MD   NPI: [id number]   UPIN:    Physician ID:          Patient Information:   Name: DARION ARENAS   Legal Sex: Female   SSN: [id number]   Patient ID: [id number]    YOB: 1970 (54 years)   Phone: [phone]   Address: [address] [place] [address]              Comments:         Order Code Tests Ordered (Total: 2)    Order Code Tests Ordered      005009 CBC and Differential    780799 Basic metabolic panel            Specimen Source:   (005009) CBC and Differential:  Blood, Venous    (736282) Basic metabolic panel:  Blood, Venous            Diagnosis Codes:   R61                 Bill Type: Third-Party    Carrier Code:          Responsible Party / Guarantor Information:   Name: DARION ARENAS   Address: [address] [place] [address] Zip: [place] [address]   Phone: [phone]   Relation to Pt: Self   Employer Name:          ABN:     Worker's Comp: N Date of Injury:             Insurance Information:   Primary Insurance: Secondary Insurance:   Carrier Code: Not on file   Ins Co Name: CIGNA PPO / EPO   Address 1: [facility] [address]   Address 2:    [facility] Zip: [place] [address]   Policy Number: [id number]   Group #: [id number]    Carrier Code:    Ins Co Name:    Address 1:    Address 2:    City, State Zip:    Policy Number:    Group #:       Primary Policy [place] / Insured: Secondary Policy Whipple / Insured:   Name: DARION ARENAS   Address: [address] [place] [address]   Pt Relation to Subscriber: Self    Name:    Address:         Pt Relation to Subscriber:           Authorization - Please Sign and Date  I hereby authorize the release of medical information related to the services described hereon and authorize payment directly to [facility].      E-Signature: Caren Guardado MD                          10/2/2024      __  Provider Signature             Date      __________________________________                    ______________  Patient Signature                                                                    Date                    [facility] Page  of    Account #: [id number] Collection Date:         Req/Control #: [id number] Collection Time:            Client / Ordering Site  Information: Physician Information:   Account Name: [facility] [place]   Address 1: [address] [place] [address]   Address 2:    [facility] Zip: [place] [address]   Phone: [phone]    Ordering: Caren Guardado   Degree: MD   NPI: [id number]   UPIN:    Physician ID:          Patient Information:   Name: DARION ARENAS   Legal Sex: Female   SSN: [id number]   Patient ID: [id number]    YOB: 1970 (54 years)   Phone: [phone]   Address: [address] [place] [address]            Comments:         Order Code Tests Ordered (Total: 2)    Order Code Tests Ordered      937122 Urinalysis with microscopic    245189 Urine culture            Specimen Source:   (668156) Urinalysis with microscopic:  Urine, Clean Catch    (335364) Urine culture:  Urine, Clean Catch            Diagnosis Codes:   R79.89                 Bill Type: Third-Party    Carrier Code:          Responsible Party / Guarantor Information:   Name: DARION ARENAS   Address: [address] [place] [address] Zip: [place] [address]   Phone: [phone]   Relation to Pt: Self   Employer Name:          ABN:     Worker's Comp: N Date of Injury:             Insurance Information:   Primary Insurance: Secondary Insurance:   Carrier Code: Not on file   Ins Co Name: CIGNA PPO / EPO   Address 1: [facility] [address]   Address 2:    [facility] Zip: [place] [address]   Policy Number: [id number]   Group #: [id number]    Carrier Code:    Ins Co Name:    Address 1:    Address 2:    City, State Zip:    Policy Number:    Group #:       Primary Policy Whipple / Insured: Secondary Policy Whipple / Insured:   Name: DARION ARENAS   Address: [address] [place] [address]   Pt Relation to Subscriber: Self    Name:    Address:         Pt Relation to Subscriber:           Authorization - Please Sign and Date  I hereby authorize the release of medical information related to the  services described hereon and authorize payment directly to [facility].      E-Signature: Caren Guardado MD                          10/2/2024      __  Provider Signature             Date      __________________________________                    ______________  Patient Signature                                                                    Date

## 2024-10-04 ENCOUNTER — APPOINTMENT (RX ONLY)
Dept: URBAN - METROPOLITAN AREA CLINIC 374 | Facility: CLINIC | Age: 54
Setting detail: DERMATOLOGY
End: 2024-10-04

## 2024-10-04 DIAGNOSIS — L30.4 ERYTHEMA INTERTRIGO: ICD-10-CM | Status: INADEQUATELY CONTROLLED

## 2024-10-04 DIAGNOSIS — L74.51 PRIMARY FOCAL HYPERHIDROSIS: ICD-10-CM | Status: INADEQUATELY CONTROLLED

## 2024-10-04 PROBLEM — L74.519 PRIMARY FOCAL HYPERHIDROSIS, UNSPECIFIED: Status: ACTIVE | Noted: 2024-10-04

## 2024-10-04 LAB
APPEARANCE UR: CLEAR
BACTERIA #/AREA URNS HPF: NORMAL /HPF
BACTERIA UR CULT: NORMAL
BASOPHILS # BLD AUTO: 46 CELLS/UL (ref 0–200)
BASOPHILS NFR BLD AUTO: 0.4 %
BILIRUB UR QL STRIP: NEGATIVE
BUN SERPL-MCNC: 8 MG/DL (ref 7–25)
BUN/CREAT SERPL: ABNORMAL (CALC) (ref 6–22)
CALCIUM SERPL-MCNC: 9.6 MG/DL (ref 8.6–10.4)
CHLORIDE SERPL-SCNC: 97 MMOL/L (ref 98–110)
CO2 SERPL-SCNC: 29 MMOL/L (ref 20–32)
COLOR UR: YELLOW
CREAT SERPL-MCNC: 0.68 MG/DL (ref 0.5–1.03)
EGFRCR SERPLBLD CKD-EPI 2021: 103 ML/MIN/1.73M2
EOSINOPHIL # BLD AUTO: 139 CELLS/UL (ref 15–500)
EOSINOPHIL NFR BLD AUTO: 1.2 %
ERYTHROCYTE [DISTWIDTH] IN BLOOD BY AUTOMATED COUNT: 12.2 % (ref 11–15)
GLUCOSE SERPL-MCNC: 72 MG/DL (ref 65–99)
GLUCOSE UR QL STRIP: NEGATIVE
HCT VFR BLD AUTO: 43.4 % (ref 35–45)
HGB BLD-MCNC: 14 G/DL (ref 11.7–15.5)
HGB UR QL STRIP: NEGATIVE
HYALINE CASTS #/AREA URNS LPF: NORMAL /LPF
KETONES UR QL STRIP: NEGATIVE
LEUKOCYTE ESTERASE UR QL STRIP: NEGATIVE
LYMPHOCYTES # BLD AUTO: 1868 CELLS/UL (ref 850–3900)
LYMPHOCYTES NFR BLD AUTO: 16.1 %
MCH RBC QN AUTO: 29.8 PG (ref 27–33)
MCHC RBC AUTO-ENTMCNC: 32.3 G/DL (ref 32–36)
MCV RBC AUTO: 92.3 FL (ref 80–100)
MONOCYTES # BLD AUTO: 951 CELLS/UL (ref 200–950)
MONOCYTES NFR BLD AUTO: 8.2 %
NEUTROPHILS # BLD AUTO: 8596 CELLS/UL (ref 1500–7800)
NEUTROPHILS NFR BLD AUTO: 74.1 %
NITRITE UR QL STRIP: NEGATIVE
PH UR STRIP: 7.5 [PH] (ref 5–8)
PLATELET # BLD AUTO: 329 THOUSAND/UL (ref 140–400)
PMV BLD REES-ECKER: 10.2 FL (ref 7.5–12.5)
POTASSIUM SERPL-SCNC: 4.2 MMOL/L (ref 3.5–5.3)
PROT UR QL STRIP: NEGATIVE
RBC # BLD AUTO: 4.7 MILLION/UL (ref 3.8–5.1)
RBC #/AREA URNS HPF: NORMAL /HPF
SERVICE CMNT-IMP: NORMAL
SODIUM SERPL-SCNC: 136 MMOL/L (ref 135–146)
SP GR UR STRIP: 1 (ref 1–1.03)
SQUAMOUS #/AREA URNS HPF: NORMAL /HPF
WBC # BLD AUTO: 11.6 THOUSAND/UL (ref 3.8–10.8)
WBC #/AREA URNS HPF: NORMAL /HPF

## 2024-10-04 PROCEDURE — ? ADDITIONAL NOTES

## 2024-10-04 PROCEDURE — ? DEFER

## 2024-10-04 PROCEDURE — ? PRESCRIPTION

## 2024-10-04 PROCEDURE — ? COUNSELING

## 2024-10-04 PROCEDURE — 99213 OFFICE O/P EST LOW 20 MIN: CPT

## 2024-10-04 PROCEDURE — ? PRESCRIPTION MEDICATION MANAGEMENT

## 2024-10-04 RX ORDER — ECONAZOLE NITRATE 10 MG/G
CREAM TOPICAL BID
Qty: 85 | Refills: 3 | Status: ERX | COMMUNITY
Start: 2024-10-04

## 2024-10-04 RX ADMIN — ECONAZOLE NITRATE: 10 CREAM TOPICAL at 00:00

## 2024-10-04 ASSESSMENT — LOCATION SIMPLE DESCRIPTION DERM
LOCATION SIMPLE: RIGHT BREAST
LOCATION SIMPLE: LEFT BREAST
LOCATION SIMPLE: RIGHT UPPER BACK

## 2024-10-04 ASSESSMENT — LOCATION DETAILED DESCRIPTION DERM
LOCATION DETAILED: RIGHT MEDIAL BREAST 5-6:00 REGION
LOCATION DETAILED: RIGHT MEDIAL UPPER BACK
LOCATION DETAILED: LEFT MEDIAL BREAST 7-8:00 REGION
LOCATION DETAILED: RIGHT INFRAMAMMARY CREASE (INNER QUADRANT)

## 2024-10-04 ASSESSMENT — LOCATION ZONE DERM: LOCATION ZONE: TRUNK

## 2024-10-04 ASSESSMENT — SEVERITY ASSESSMENT: SEVERITY: MILD TO MODERATE

## 2024-10-04 ASSESSMENT — PAIN INTENSITY VAS: HOW INTENSE IS YOUR PAIN 0 BEING NO PAIN, 10 BEING THE MOST SEVERE PAIN POSSIBLE?: NO PAIN

## 2024-10-04 NOTE — PROCEDURE: PRESCRIPTION MEDICATION MANAGEMENT
Render In Strict Bullet Format?: No
Initiate Treatment: econazole 1 % topical cream Apply to the rash of the trunk twice daily x 6 weeks.
Detail Level: Zone

## 2024-10-04 NOTE — PROCEDURE: COUNSELING
Detail Level: Zone
Medical Necessity Information: LCD Guidelines vary from payer to payer. Please check with your payer's policy to determine medical necessity.
Detail Level: Generalized
Medical Necessity Clause: Botulinum toxin hyperhidrosis therapy is medically necessary because

## 2024-10-04 NOTE — PROCEDURE: ADDITIONAL NOTES
Render Risk Assessment In Note?: no
Detail Level: Detailed
Additional Notes: Discussed glycopyrrolate pt declined due to side effects

## 2024-10-08 ENCOUNTER — APPOINTMENT (RX ONLY)
Dept: URBAN - METROPOLITAN AREA CLINIC 374 | Facility: CLINIC | Age: 54
Setting detail: DERMATOLOGY
End: 2024-10-08

## 2024-10-08 DIAGNOSIS — L30.4 ERYTHEMA INTERTRIGO: ICD-10-CM | Status: WORSENING

## 2024-10-08 PROCEDURE — 99213 OFFICE O/P EST LOW 20 MIN: CPT

## 2024-10-08 PROCEDURE — ? PRESCRIPTION

## 2024-10-08 PROCEDURE — ? PRESCRIPTION MEDICATION MANAGEMENT

## 2024-10-08 PROCEDURE — ? MEDICATION COUNSELING

## 2024-10-08 PROCEDURE — ? PHOTO-DOCUMENTATION

## 2024-10-08 PROCEDURE — ? COUNSELING

## 2024-10-08 RX ORDER — NYSTATIN CREAM 100000 [USP'U]/G
CREAM TOPICAL BID
Qty: 30 | Refills: 2 | Status: ERX | COMMUNITY
Start: 2024-10-08

## 2024-10-08 RX ORDER — TRIAMCINOLONE ACETONIDE 1 MG/G
OINTMENT TOPICAL
Qty: 80 | Refills: 2 | Status: ERX | COMMUNITY
Start: 2024-10-08

## 2024-10-08 RX ADMIN — TRIAMCINOLONE ACETONIDE: 1 OINTMENT TOPICAL at 00:00

## 2024-10-08 RX ADMIN — NYSTATIN CREAM: 100000 CREAM TOPICAL at 00:00

## 2024-10-08 ASSESSMENT — PAIN INTENSITY VAS: HOW INTENSE IS YOUR PAIN 0 BEING NO PAIN, 10 BEING THE MOST SEVERE PAIN POSSIBLE?: NO PAIN

## 2024-10-08 ASSESSMENT — SEVERITY ASSESSMENT: SEVERITY: MILD TO MODERATE

## 2024-10-08 ASSESSMENT — LOCATION ZONE DERM: LOCATION ZONE: TRUNK

## 2024-10-08 ASSESSMENT — LOCATION SIMPLE DESCRIPTION DERM
LOCATION SIMPLE: LEFT BREAST
LOCATION SIMPLE: RIGHT BREAST

## 2024-10-08 ASSESSMENT — LOCATION DETAILED DESCRIPTION DERM
LOCATION DETAILED: RIGHT INFRAMAMMARY CREASE (INNER QUADRANT)
LOCATION DETAILED: LEFT MEDIAL BREAST 7-8:00 REGION
LOCATION DETAILED: RIGHT MEDIAL BREAST 5-6:00 REGION

## 2024-10-08 ASSESSMENT — BSA RASH: BSA RASH: 2

## 2024-10-08 NOTE — PROCEDURE: PRESCRIPTION MEDICATION MANAGEMENT
Blood work    Send me your vaccine records    
Render In Strict Bullet Format?: No
Initiate Treatment: triamcinolone acetonide 0.1 % topical ointment : Apply a thin layer to the chest BID x 2 weeks, then take one week break, repeat as needed\\nnystatin 100,000 unit/gram topical cream BID: Apply a thin layer to the chest BID
Detail Level: Zone
Discontinue Regimen: econazole 1 % topical cream Apply to the rash of the trunk twice daily x 6 weeks

## 2024-10-08 NOTE — PROCEDURE: MEDICATION COUNSELING
Libtayo Pregnancy And Lactation Text: This medication is contraindicated in pregnancy and when breast feeding.
Topical Retinoid Pregnancy And Lactation Text: This medication is Pregnancy Category C. It is unknown if this medication is excreted in breast milk.
Benzoyl Peroxide Pregnancy And Lactation Text: This medication is Pregnancy Category C. It is unknown if benzoyl peroxide is excreted in breast milk.
Sotyktu Counseling:  I discussed the most common side effects of Sotyktu including: common cold, sore throat, sinus infections, cold sores, canker sores, folliculitis, and acne.  I also discussed more serious side effects of Sotyktu including but not limited to: serious allergic reactions; increased risk for infections such as TB; cancers such as lymphomas; rhabdomyolysis and elevated CPK; and elevated triglycerides and liver enzymes. 
Taltz Counseling: I discussed with the patient the risks of ixekizumab including but not limited to immunosuppression, serious infections, worsening of inflammatory bowel disease and drug reactions.  The patient understands that monitoring is required including a PPD at baseline and must alert us or the primary physician if symptoms of infection or other concerning signs are noted.
Griseofulvin Pregnancy And Lactation Text: This medication is Pregnancy Category X and is known to cause serious birth defects. It is unknown if this medication is excreted in breast milk but breast feeding should be avoided.
Drysol Pregnancy And Lactation Text: This medication is considered safe during pregnancy and breast feeding.
Hydroxyzine Counseling: Patient advised that the medication is sedating and not to drive a car after taking this medication.  Patient informed of potential adverse effects including but not limited to dry mouth, urinary retention, and blurry vision.  The patient verbalized understanding of the proper use and possible adverse effects of hydroxyzine.  All of the patient's questions and concerns were addressed.
Glycopyrrolate Pregnancy And Lactation Text: This medication is Pregnancy Category B and is considered safe during pregnancy. It is unknown if it is excreted breast milk.
Cellcept Counseling:  I discussed with the patient the risks of mycophenolate mofetil including but not limited to infection/immunosuppression, GI upset, hypokalemia, hypercholesterolemia, bone marrow suppression, lymphoproliferative disorders, malignancy, GI ulceration/bleed/perforation, colitis, interstitial lung disease, kidney failure, progressive multifocal leukoencephalopathy, and birth defects.  The patient understands that monitoring is required including a baseline creatinine and regular CBC testing. In addition, patient must alert us immediately if symptoms of infection or other concerning signs are noted.
Siliq Pregnancy And Lactation Text: The risk during pregnancy and breastfeeding is uncertain with this medication.
Qbrexza Counseling:  I discussed with the patient the risks of Qbrexza including but not limited to headache, mydriasis, blurred vision, dry eyes, nasal dryness, dry mouth, dry throat, dry skin, urinary hesitation, and constipation.  Local skin reactions including erythema, burning, stinging, and itching can also occur.
Oxybutynin Pregnancy And Lactation Text: This medication is Pregnancy Category B and is considered safe during pregnancy. It is unknown if it is excreted in breast milk.
Doxycycline Pregnancy And Lactation Text: This medication is Pregnancy Category D and not consider safe during pregnancy. It is also excreted in breast milk but is considered safe for shorter treatment courses.
Birth Control Pills Counseling: Birth Control Pill Counseling: I discussed with the patient the potential side effects of OCPs including but not limited to increased risk of stroke, heart attack, thrombophlebitis, deep venous thrombosis, hepatic adenomas, breast changes, GI upset, headaches, and depression.  The patient verbalized understanding of the proper use and possible adverse effects of OCPs. All of the patient's questions and concerns were addressed.
Isotretinoin Pregnancy And Lactation Text: This medication is Pregnancy Category X and is considered extremely dangerous during pregnancy. It is unknown if it is excreted in breast milk.
Azithromycin Counseling:  I discussed with the patient the risks of azithromycin including but not limited to GI upset, allergic reaction, drug rash, diarrhea, and yeast infections.
Clofazimine Counseling:  I discussed with the patient the risks of clofazimine including but not limited to skin and eye pigmentation, liver damage, nausea/vomiting, gastrointestinal bleeding and allergy.
Hyrimoz Counseling:  I discussed with the patient the risks of adalimumab including but not limited to myelosuppression, immunosuppression, autoimmune hepatitis, demyelinating diseases, lymphoma, and serious infections.  The patient understands that monitoring is required including a PPD at baseline and must alert us or the primary physician if symptoms of infection or other concerning signs are noted.
Klisyri Pregnancy And Lactation Text: It is unknown if this medication can harm a developing fetus or if it is excreted in breast milk.
Zoryve Counseling:  I discussed with the patient that Zoryve is not for use in the eyes, mouth or vagina. The most commonly reported side effects include diarrhea, headache, insomnia, application site pain, upper respiratory tract infections, and urinary tract infections.  All of the patient's questions and concerns were addressed.
Topical Steroids Applications Pregnancy And Lactation Text: Most topical steroids are considered safe to use during pregnancy and lactation.  Any topical steroid applied to the breast or nipple should be washed off before breastfeeding.
Valtrex Pregnancy And Lactation Text: this medication is Pregnancy Category B and is considered safe during pregnancy. This medication is not directly found in breast milk but it's metabolite acyclovir is present.
Tazorac Counseling:  Patient advised that medication is irritating and drying.  Patient may need to apply sparingly and wash off after an hour before eventually leaving it on overnight.  The patient verbalized understanding of the proper use and possible adverse effects of tazorac.  All of the patient's questions and concerns were addressed.
Bimzelx Pregnancy And Lactation Text: This medication crosses the placenta and the safety is uncertain during pregnancy. It is unknown if this medication is present in breast milk.
Propranolol Counseling:  I discussed with the patient the risks of propranolol including but not limited to low heart rate, low blood pressure, low blood sugar, restlessness and increased cold sensitivity. They should call the office if they experience any of these side effects.
Cellcept Pregnancy And Lactation Text: This medication is Pregnancy Category D and isn't considered safe during pregnancy. It is unknown if this medication is excreted in breast milk.
Rifampin Pregnancy And Lactation Text: This medication is Pregnancy Category C and it isn't know if it is safe during pregnancy. It is also excreted in breast milk and should not be used if you are breast feeding.
Simponi Counseling:  I discussed with the patient the risks of golimumab including but not limited to myelosuppression, immunosuppression, autoimmune hepatitis, demyelinating diseases, lymphoma, and serious infections.  The patient understands that monitoring is required including a PPD at baseline and must alert us or the primary physician if symptoms of infection or other concerning signs are noted.
Hydroxyzine Pregnancy And Lactation Text: This medication is not safe during pregnancy and should not be taken. It is also excreted in breast milk and breast feeding isn't recommended.
Elidel Counseling: Patient may experience a mild burning sensation during topical application. Elidel is not approved in children less than 2 years of age. There have been case reports of hematologic and skin malignancies in patients using topical calcineurin inhibitors although causality is questionable.
Odomzo Counseling- I discussed with the patient the risks of Odomzo including but not limited to nausea, vomiting, diarrhea, constipation, weight loss, changes in the sense of taste, decreased appetite, muscle spasms, and hair loss.  The patient verbalized understanding of the proper use and possible adverse effects of Odomzo.  All of the patient's questions and concerns were addressed.
Sotyktu Pregnancy And Lactation Text: There is insufficient data to evaluate whether or not Sotyktu is safe to use during pregnancy.   It is not known if Sotyktu passes into breast milk and whether or not it is safe to use when breastfeeding.  
Hydroxychloroquine Counseling:  I discussed with the patient that a baseline ophthalmologic exam is needed at the start of therapy and every year thereafter while on therapy. A CBC may also be warranted for monitoring.  The side effects of this medication were discussed with the patient, including but not limited to agranulocytosis, aplastic anemia, seizures, rashes, retinopathy, and liver toxicity. Patient instructed to call the office should any adverse effect occur.  The patient verbalized understanding of the proper use and possible adverse effects of Plaquenil.  All the patient's questions and concerns were addressed.
Qbrexza Pregnancy And Lactation Text: There is no available data on Qbrexza use in pregnant women.  There is no available data on Qbrexza use in lactation.
Carac Counseling:  I discussed with the patient the risks of Carac including but not limited to erythema, scaling, itching, weeping, crusting, and pain.
Mirvaso Counseling: Mirvaso is a topical medication which can decrease superficial blood flow where applied. Side effects are uncommon and include stinging, redness and allergic reactions.
Itraconazole Counseling:  I discussed with the patient the risks of itraconazole including but not limited to liver damage, nausea/vomiting, neuropathy, and severe allergy.  The patient understands that this medication is best absorbed when taken with acidic beverages such as non-diet cola or ginger ale.  The patient understands that monitoring is required including baseline LFTs and repeat LFTs at intervals.  The patient understands that they are to contact us or the primary physician if concerning signs are noted.
Clofazimine Pregnancy And Lactation Text: This medication is Pregnancy Category C and isn't considered safe during pregnancy. It is excreted in breast milk.
High Dose Vitamin A Counseling: Side effects reviewed, pt to contact office should one occur.
Erythromycin Counseling:  I discussed with the patient the risks of erythromycin including but not limited to GI upset, allergic reaction, drug rash, diarrhea, increase in liver enzymes, and yeast infections.
Hyrimoz Pregnancy And Lactation Text: This medication is Pregnancy Category B and is considered safe during pregnancy. It is unknown if this medication is excreted in breast milk.
Azithromycin Pregnancy And Lactation Text: This medication is considered safe during pregnancy and is also secreted in breast milk.
Cibinqo Counseling: I discussed with the patient the risks of Cibinqo therapy including but not limited to common cold, nausea, headache, cold sores, increased blood CPK levels, dizziness, UTIs, fatigue, acne, and vomitting. Live vaccines should be avoided.  This medication has been linked to serious infections; higher rate of mortality; malignancy and lymphoproliferative disorders; major adverse cardiovascular events; thrombosis; thrombocytopenia and lymphopenia; lipid elevations; and retinal detachment.
Zoryve Pregnancy And Lactation Text: It is unknown if this medication can cause problems during pregnancy and breastfeeding.
Sarecycline Counseling: Patient advised regarding possible photosensitivity and discoloration of the teeth, skin, lips, tongue and gums.  Patient instructed to avoid sunlight, if possible.  When exposed to sunlight, patients should wear protective clothing, sunglasses, and sunscreen.  The patient was instructed to call the office immediately if the following severe adverse effects occur:  hearing changes, easy bruising/bleeding, severe headache, or vision changes.  The patient verbalized understanding of the proper use and possible adverse effects of sarecycline.  All of the patient's questions and concerns were addressed.
Topical Sulfur Applications Counseling: Topical Sulfur Counseling: Patient counseled that this medication may cause skin irritation or allergic reactions.  In the event of skin irritation, the patient was advised to reduce the amount of the drug applied or use it less frequently.   The patient verbalized understanding of the proper use and possible adverse effects of topical sulfur application.  All of the patient's questions and concerns were addressed.
Minoxidil Counseling: Minoxidil is a topical medication which can increase blood flow where it is applied. It is uncertain how this medication increases hair growth. Side effects are uncommon and include stinging and allergic reactions.
Use Enhanced Medication Counseling?: No
Tremfya Counseling: I discussed with the patient the risks of guselkumab including but not limited to immunosuppression, serious infections, and drug reactions.  The patient understands that monitoring is required including a PPD at baseline and must alert us or the primary physician if symptoms of infection or other concerning signs are noted.
Tazorac Pregnancy And Lactation Text: This medication is not safe during pregnancy. It is unknown if this medication is excreted in breast milk.
Itraconazole Pregnancy And Lactation Text: This medication is Pregnancy Category C and it isn't know if it is safe during pregnancy. It is also excreted in breast milk.
Cyclophosphamide Counseling:  I discussed with the patient the risks of cyclophosphamide including but not limited to hair loss, hormonal abnormalities, decreased fertility, abdominal pain, diarrhea, nausea and vomiting, bone marrow suppression and infection. The patient understands that monitoring is required while taking this medication.
Rhofade Counseling: Rhofade is a topical medication which can decrease superficial blood flow where applied. Side effects are uncommon and include stinging, redness and allergic reactions.
Propranolol Pregnancy And Lactation Text: This medication is Pregnancy Category C and it isn't known if it is safe during pregnancy. It is excreted in breast milk.
Cimzia Counseling:  I discussed with the patient the risks of Cimzia including but not limited to immunosuppression, allergic reactions and infections.  The patient understands that monitoring is required including a PPD at baseline and must alert us or the primary physician if symptoms of infection or other concerning signs are noted.
Pt BIB father with report of abd pain since this am. Denies fever or change in bm. Dad reports episode of vomiting today.
Erythromycin Pregnancy And Lactation Text: This medication is Pregnancy Category B and is considered safe during pregnancy. It is also excreted in breast milk.
Xeljanz Counseling: I discussed with the patient the risks of Xeljanz therapy including increased risk of infection, liver issues, headache, diarrhea, or cold symptoms. Live vaccines should be avoided. They were instructed to call if they have any problems.
Hydroxychloroquine Pregnancy And Lactation Text: This medication has been shown to cause fetal harm but it isn't assigned a Pregnancy Risk Category. There are small amounts excreted in breast milk.
Carac Pregnancy And Lactation Text: This medication is Pregnancy Category X and contraindicated in pregnancy and in women who may become pregnant. It is unknown if this medication is excreted in breast milk.
Odomzo Pregnancy And Lactation Text: This medication is Pregnancy Category X and is absolutely contraindicated during pregnancy. It is unknown if it is excreted in breast milk.
Mirvaso Pregnancy And Lactation Text: This medication has not been assigned a Pregnancy Risk Category. It is unknown if the medication is excreted in breast milk.
Cibinqo Pregnancy And Lactation Text: It is unknown if this medication will adversely affect pregnancy or breast feeding.  You should not take this medication if you are currently pregnant or planning a pregnancy or while breastfeeding.
Colchicine Counseling:  Patient counseled regarding adverse effects including but not limited to stomach upset (nausea, vomiting, stomach pain, or diarrhea).  Patient instructed to limit alcohol consumption while taking this medication.  Colchicine may reduce blood counts especially with prolonged use.  The patient understands that monitoring of kidney function and blood counts may be required, especially at baseline. The patient verbalized understanding of the proper use and possible adverse effects of colchicine.  All of the patient's questions and concerns were addressed.
Zyclara Counseling:  I discussed with the patient the risks of imiquimod including but not limited to erythema, scaling, itching, weeping, crusting, and pain.  Patient understands that the inflammatory response to imiquimod is variable from person to person and was educated regarded proper titration schedule.  If flu-like symptoms develop, patient knows to discontinue the medication and contact us.
High Dose Vitamin A Pregnancy And Lactation Text: High dose vitamin A therapy is contraindicated during pregnancy and breast feeding.
Ilumya Counseling: I discussed with the patient the risks of tildrakizumab including but not limited to immunosuppression, malignancy, posterior leukoencephalopathy syndrome, and serious infections.  The patient understands that monitoring is required including a PPD at baseline and must alert us or the primary physician if symptoms of infection or other concerning signs are noted.
Bactrim Counseling:  I discussed with the patient the risks of sulfa antibiotics including but not limited to GI upset, allergic reaction, drug rash, diarrhea, dizziness, photosensitivity, and yeast infections.  Rarely, more serious reactions can occur including but not limited to aplastic anemia, agranulocytosis, methemoglobinemia, blood dyscrasias, liver or kidney failure, lung infiltrates or desquamative/blistering drug rashes.
Albendazole Counseling:  I discussed with the patient the risks of albendazole including but not limited to cytopenia, kidney damage, nausea/vomiting and severe allergy.  The patient understands that this medication is being used in an off-label manner.
Minoxidil Pregnancy And Lactation Text: This medication has not been assigned a Pregnancy Risk Category but animal studies failed to show danger with the topical medication. It is unknown if the medication is excreted in breast milk.
Sarecycline Pregnancy And Lactation Text: This medication is Pregnancy Category D and not consider safe during pregnancy. It is also excreted in breast milk.
Ketoconazole Counseling:   Patient counseled regarding improving absorption with orange juice.  Adverse effects include but are not limited to breast enlargement, headache, diarrhea, nausea, upset stomach, liver function test abnormalities, taste disturbance, and stomach pain.  There is a rare possibility of liver failure that can occur when taking ketoconazole. The patient understands that monitoring of LFTs may be required, especially at baseline. The patient verbalized understanding of the proper use and possible adverse effects of ketoconazole.  All of the patient's questions and concerns were addressed.
Topical Sulfur Applications Pregnancy And Lactation Text: This medication is considered safe during pregnancy and breast feeding secondary to limited systemic absorption.
Eucrisa Counseling: Patient may experience a mild burning sensation during topical application. Eucrisa is not approved in children less than 3 months of age.
Cimzia Pregnancy And Lactation Text: This medication crosses the placenta but can be considered safe in certain situations. Cimzia may be excreted in breast milk.
Low Dose Naltrexone Counseling- I discussed with the patient the potential risks and side effects of low dose naltrexone including but not limited to: more vivid dreams, headaches, nausea, vomiting, abdominal pain, fatigue, dizziness, and anxiety.
Topical Clindamycin Counseling: Patient counseled that this medication may cause skin irritation or allergic reactions.  In the event of skin irritation, the patient was advised to reduce the amount of the drug applied or use it less frequently.   The patient verbalized understanding of the proper use and possible adverse effects of clindamycin.  All of the patient's questions and concerns were addressed.
SSKI Counseling:  I discussed with the patient the risks of SSKI including but not limited to thyroid abnormalities, metallic taste, GI upset, fever, headache, acne, arthralgias, paraesthesias, lymphadenopathy, easy bleeding, arrhythmias, and allergic reaction.
Cyclophosphamide Pregnancy And Lactation Text: This medication is Pregnancy Category D and it isn't considered safe during pregnancy. This medication is excreted in breast milk.
Skyrizi Counseling: I discussed with the patient the risks of risankizumab-rzaa including but not limited to immunosuppression, and serious infections.  The patient understands that monitoring is required including a PPD at baseline and must alert us or the primary physician if symptoms of infection or other concerning signs are noted.
Metronidazole Counseling:  I discussed with the patient the risks of metronidazole including but not limited to seizures, nausea/vomiting, a metallic taste in the mouth, nausea/vomiting and severe allergy.
Xelvickiz Pregnancy And Lactation Text: This medication is Pregnancy Category D and is not considered safe during pregnancy.  The risk during breast feeding is also uncertain.
Olanzapine Pregnancy And Lactation Text: This medication is pregnancy category C.   There are no adequate and well controlled trials with olanzapine in pregnant females.  Olanzapine should be used during pregnancy only if the potential benefit justifies the potential risk to the fetus.   In a study in lactating healthy women, olanzapine was excreted in breast milk.  It is recommended that women taking olanzapine should not breast feed.
Bactrim Pregnancy And Lactation Text: This medication is Pregnancy Category D and is known to cause fetal risk.  It is also excreted in breast milk.
Litfulo Counseling: I discussed with the patient the risks of Litfulo therapy including but not limited to upper respiratory tract infections, shingles, cold sores, and nausea. Live vaccines should be avoided.  This medication has been linked to serious infections; higher rate of mortality; malignancy and lymphoproliferative disorders; major adverse cardiovascular events; thrombosis; gastrointestinal perforations; neutropenia; lymphopenia; anemia; liver enzyme elevations; and lipid elevations.
Calcipotriene Counseling:  I discussed with the patient the risks of calcipotriene including but not limited to erythema, scaling, itching, and irritation.
Opzelura Counseling:  I discussed with the patient the risks of Opzelura including but not limited to nasopharngitis, bronchitis, ear infection, eosinophila, hives, diarrhea, folliculitis, tonsillitis, and rhinorrhea.  Taken orally, this medication has been linked to serious infections; higher rate of mortality; malignancy and lymphoproliferative disorders; major adverse cardiovascular events; thrombosis; thrombocytopenia, anemia, and neutropenia; and lipid elevations.
Albendazole Pregnancy And Lactation Text: This medication is Pregnancy Category C and it isn't known if it is safe during pregnancy. It is also excreted in breast milk.
Dutasteride Male Counseling: Dustasteride Counseling:  I discussed with the patient the risks of use of dutasteride including but not limited to decreased libido, decreased ejaculate volume, and gynecomastia. Women who can become pregnant should not handle medication.  All of the patient's questions and concerns were addressed.
Xolair Counseling:  Patient informed of potential adverse effects including but not limited to fever, muscle aches, rash and allergic reactions.  The patient verbalized understanding of the proper use and possible adverse effects of Xolair.  All of the patient's questions and concerns were addressed.
Tetracycline Counseling: Patient counseled regarding possible photosensitivity and increased risk for sunburn.  Patient instructed to avoid sunlight, if possible.  When exposed to sunlight, patients should wear protective clothing, sunglasses, and sunscreen.  The patient was instructed to call the office immediately if the following severe adverse effects occur:  hearing changes, easy bruising/bleeding, severe headache, or vision changes.  The patient verbalized understanding of the proper use and possible adverse effects of tetracycline.  All of the patient's questions and concerns were addressed. Patient understands to avoid pregnancy while on therapy due to potential birth defects.
Cyclosporine Counseling:  I discussed with the patient the risks of cyclosporine including but not limited to hypertension, gingival hyperplasia,myelosuppression, immunosuppression, liver damage, kidney damage, neurotoxicity, lymphoma, and serious infections. The patient understands that monitoring is required including baseline blood pressure, CBC, CMP, lipid panel and uric acid, and then 1-2 times monthly CMP and blood pressure.
Cosentyx Counseling:  I discussed with the patient the risks of Cosentyx including but not limited to worsening of Crohn's disease, immunosuppression, allergic reactions and infections.  The patient understands that monitoring is required including a PPD at baseline and must alert us or the primary physician if symptoms of infection or other concerning signs are noted.
Birth Control Pills Pregnancy And Lactation Text: This medication should be avoided if pregnant and for the first 30 days post-partum.
Wartpeel Counseling:  I discussed with the patient the risks of Wartpeel including but not limited to erythema, scaling, itching, weeping, crusting, and pain.
Metronidazole Pregnancy And Lactation Text: This medication is Pregnancy Category B and considered safe during pregnancy.  It is also excreted in breast milk.
Calcipotriene Pregnancy And Lactation Text: The use of this medication during pregnancy or lactation is not recommended as there is insufficient data.
Low Dose Naltrexone Pregnancy And Lactation Text: Naltrexone is pregnancy category C.  There have been no adequate and well-controlled studies in pregnant women.  It should be used in pregnancy only if the potential benefit justifies the potential risk to the fetus.   Limited data indicates that naltrexone is minimally excreted into breastmilk.
Ketoconazole Pregnancy And Lactation Text: This medication is Pregnancy Category C and it isn't know if it is safe during pregnancy. It is also excreted in breast milk and breast feeding isn't recommended.
Litfulo Pregnancy And Lactation Text: Based on animal studies, Lifulo may cause embryo-fetal harm when administered to pregnant women.  The medication should not be used in pregnancy.  Breastfeeding is not recommended during treatment.
Solaraze Counseling:  I discussed with the patient the risks of Solaraze including but not limited to erythema, scaling, itching, weeping, crusting, and pain.
Sski Pregnancy And Lactation Text: This medication is Pregnancy Category D and isn't considered safe during pregnancy. It is excreted in breast milk.
Infliximab Counseling:  I discussed with the patient the risks of infliximab including but not limited to myelosuppression, immunosuppression, autoimmune hepatitis, demyelinating diseases, lymphoma, and serious infections.  The patient understands that monitoring is required including a PPD at baseline and must alert us or the primary physician if symptoms of infection or other concerning signs are noted.
Aklief counseling:  Patient advised to apply a pea-sized amount only at bedtime and wait 30 minutes after washing their face before applying.  If too drying, patient may add a non-comedogenic moisturizer.  The most commonly reported side effects including irritation, redness, scaling, dryness, stinging, burning, itching, and increased risk of sunburn.  The patient verbalized understanding of the proper use and possible adverse effects of retinoids.  All of the patient's questions and concerns were addressed.
Cephalexin Counseling: I counseled the patient regarding use of cephalexin as an antibiotic for prophylactic and/or therapeutic purposes. Cephalexin (commonly prescribed under brand name Keflex) is a cephalosporin antibiotic which is active against numerous classes of bacteria, including most skin bacteria. Side effects may include nausea, diarrhea, gastrointestinal upset, rash, hives, yeast infections, and in rare cases, hepatitis, kidney disease, seizures, fever, confusion, neurologic symptoms, and others. Patients with severe allergies to penicillin medications are cautioned that there is about a 10% incidence of cross-reactivity with cephalosporins. When possible, patients with penicillin allergies should use alternatives to cephalosporins for antibiotic therapy.
Opzelura Pregnancy And Lactation Text: There is insufficient data to evaluate drug-associated risk for major birth defects, miscarriage, or other adverse maternal or fetal outcomes.  There is a pregnancy registry that monitors pregnancy outcomes in pregnant persons exposed to the medication during pregnancy.  It is unknown if this medication is excreted in breast milk.  Do not breastfeed during treatment and for about 4 weeks after the last dose.
Oral Minoxidil Counseling- I discussed with the patient the risks of oral minoxidil including but not limited to shortness of breath, swelling of the feet or ankles, dizziness, lightheadedness, unwanted hair growth and allergic reaction.  The patient verbalized understanding of the proper use and possible adverse effects of oral minoxidil.  All of the patient's questions and concerns were addressed.
Acitretin Counseling:  I discussed with the patient the risks of acitretin including but not limited to hair loss, dry lips/skin/eyes, liver damage, hyperlipidemia, depression/suicidal ideation, photosensitivity.  Serious rare side effects can include but are not limited to pancreatitis, pseudotumor cerebri, bony changes, clot formation/stroke/heart attack.  Patient understands that alcohol is contraindicated since it can result in liver toxicity and significantly prolong the elimination of the drug by many years.
Dutasteride Female Counseling: Dutasteride Counseling:  I discussed with the patient the risks of use of dutasteride including but not limited to decreased libido and sexual dysfunction. Explained the teratogenic nature of the medication and stressed the importance of not getting pregnant during treatment. All of the patient's questions and concerns were addressed.
Dapsone Counseling: I discussed with the patient the risks of dapsone including but not limited to hemolytic anemia, agranulocytosis, rashes, methemoglobinemia, kidney failure, peripheral neuropathy, headaches, GI upset, and liver toxicity.  Patients who start dapsone require monitoring including baseline LFTs and weekly CBCs for the first month, then every month thereafter.  The patient verbalized understanding of the proper use and possible adverse effects of dapsone.  All of the patient's questions and concerns were addressed.
Ivermectin Counseling:  Patient instructed to take medication on an empty stomach with a full glass of water.  Patient informed of potential adverse effects including but not limited to nausea, diarrhea, dizziness, itching, and swelling of the extremities or lymph nodes.  The patient verbalized understanding of the proper use and possible adverse effects of ivermectin.  All of the patient's questions and concerns were addressed.
Dupixent Pregnancy And Lactation Text: This medication likely crosses the placenta but the risk for the fetus is uncertain. This medication is excreted in breast milk.
Spironolactone Counseling: Patient advised regarding risks of diarrhea, abdominal pain, hyperkalemia, birth defects (for female patients), liver toxicity and renal toxicity. The patient may need blood work to monitor liver and kidney function and potassium levels while on therapy. The patient verbalized understanding of the proper use and possible adverse effects of spironolactone.  All of the patient's questions and concerns were addressed.
Xolair Pregnancy And Lactation Text: This medication is Pregnancy Category B and is considered safe during pregnancy. This medication is excreted in breast milk.
Topical Ketoconazole Counseling: Patient counseled that this medication may cause skin irritation or allergic reactions.  In the event of skin irritation, the patient was advised to reduce the amount of the drug applied or use it less frequently.   The patient verbalized understanding of the proper use and possible adverse effects of ketoconazole.  All of the patient's questions and concerns were addressed.
Minocycline Counseling: Patient advised regarding possible photosensitivity and discoloration of the teeth, skin, lips, tongue and gums.  Patient instructed to avoid sunlight, if possible.  When exposed to sunlight, patients should wear protective clothing, sunglasses, and sunscreen.  The patient was instructed to call the office immediately if the following severe adverse effects occur:  hearing changes, easy bruising/bleeding, severe headache, or vision changes.  The patient verbalized understanding of the proper use and possible adverse effects of minocycline.  All of the patient's questions and concerns were addressed.
Thalidomide Counseling: I discussed with the patient the risks of thalidomide including but not limited to birth defects, anxiety, weakness, chest pain, dizziness, cough and severe allergy.
Opioid Counseling: I discussed with the patient the potential side effects of opioids including but not limited to addiction, altered mental status, and depression. I stressed avoiding alcohol, benzodiazepines, muscle relaxants and sleep aids unless specifically okayed by a physician. The patient verbalized understanding of the proper use and possible adverse effects of opioids. All of the patient's questions and concerns were addressed. They were instructed to flush the remaining pills down the toilet if they did not need them for pain.
Spevigo Counseling: I discussed with the patient the risks of Spevigo including but not limited to fatigue, nasuea, vomiting, headache, pruritus, urinary tract infection, an infusion related reactions.  The patient understands that monitoring is required including screening for tuberculosis at baseline and yearly screening thereafter while continuing Spevigo therapy. They should contact us if symptoms of infection or other concerning signs are noted.
Hydroquinone Counseling:  Patient advised that medication may result in skin irritation, lightening (hypopigmentation), dryness, and burning.  In the event of skin irritation, the patient was advised to reduce the amount of the drug applied or use it less frequently.  Rarely, spots that are treated with hydroquinone can become darker (pseudoochronosis).  Should this occur, patient instructed to stop medication and call the office. The patient verbalized understanding of the proper use and possible adverse effects of hydroquinone.  All of the patient's questions and concerns were addressed.
Niacinamide Counseling: I recommended taking niacin or niacinamide, also know as vitamin B3, twice daily. Recent evidence suggests that taking vitamin B3 (500 mg twice daily) can reduce the risk of actinic keratoses and non-melanoma skin cancers. Side effects of vitamin B3 include flushing and headache.
Solaraze Pregnancy And Lactation Text: This medication is Pregnancy Category B and is considered safe. There is some data to suggest avoiding during the third trimester. It is unknown if this medication is excreted in breast milk.
Cyclosporine Pregnancy And Lactation Text: This medication is Pregnancy Category C and it isn't know if it is safe during pregnancy. This medication is excreted in breast milk.
Cantharidin Counseling:  I discussed with the patient the risks of Cantharidin including but not limited to pain, redness, burning, itching, and blistering.
Picato Counseling:  I discussed with the patient the risks of Picato including but not limited to erythema, scaling, itching, weeping, crusting, and pain.
Cimetidine Counseling:  I discussed with the patient the risks of Cimetidine including but not limited to gynecomastia, headache, diarrhea, nausea, drowsiness, arrhythmias, pancreatitis, skin rashes, psychosis, bone marrow suppression and kidney toxicity.
Dapsone Pregnancy And Lactation Text: This medication is Pregnancy Category C and is not considered safe during pregnancy or breast feeding.
Terbinafine Counseling: Patient counseling regarding adverse effects of terbinafine including but not limited to headache, diarrhea, rash, upset stomach, liver function test abnormalities, itching, taste/smell disturbance, nausea, abdominal pain, and flatulence.  There is a rare possibility of liver failure that can occur when taking terbinafine.  The patient understands that a baseline LFT and kidney function test may be required. The patient verbalized understanding of the proper use and possible adverse effects of terbinafine.  All of the patient's questions and concerns were addressed.
Aklief Pregnancy And Lactation Text: It is unknown if this medication is safe to use during pregnancy.  It is unknown if this medication is excreted in breast milk.  Breastfeeding women should use the topical cream on the smallest area of the skin for the shortest time needed while breastfeeding.  Do not apply to nipple and areola.
Oral Minoxidil Pregnancy And Lactation Text: This medication should only be used when clearly needed if you are pregnant, attempting to become pregnant or breast feeding.
Cantharidin Pregnancy And Lactation Text: This medication has not been proven safe during pregnancy. It is unknown if this medication is excreted in breast milk.
Cephalexin Pregnancy And Lactation Text: This medication is Pregnancy Category B and considered safe during pregnancy.  It is also excreted in breast milk but can be used safely for shorter doses.
Acitretin Pregnancy And Lactation Text: This medication is Pregnancy Category X and should not be given to women who are pregnant or may become pregnant in the future. This medication is excreted in breast milk.
Olumiant Counseling: I discussed with the patient the risks of Olumiant therapy including but not limited to upper respiratory tract infections, shingles, cold sores, and nausea. Live vaccines should be avoided.  This medication has been linked to serious infections; higher rate of mortality; malignancy and lymphoproliferative disorders; major adverse cardiovascular events; thrombosis; gastrointestinal perforations; neutropenia; lymphopenia; anemia; liver enzyme elevations; and lipid elevations.
Detail Level: Detailed
Spironolactone Pregnancy And Lactation Text: This medication can cause feminization of the male fetus and should be avoided during pregnancy. The active metabolite is also found in breast milk.
Enbrel Counseling:  I discussed with the patient the risks of etanercept including but not limited to myelosuppression, immunosuppression, autoimmune hepatitis, demyelinating diseases, lymphoma, and infections.  The patient understands that monitoring is required including a PPD at baseline and must alert us or the primary physician if symptoms of infection or other concerning signs are noted.
Dutasteride Pregnancy And Lactation Text: This medication is absolutely contraindicated in women, especially during pregnancy and breast feeding. Feminization of male fetuses is possible if taking while pregnant.
Winlevi Counseling:  I discussed with the patient the risks of topical clascoterone including but not limited to erythema, scaling, itching, and stinging. Patient voiced their understanding.
Dupixent Counseling: I discussed with the patient the risks of dupilumab including but not limited to eye inflammation and irritation, cold sores, injection site reactions, allergic reactions and increased risk of parasitic infection. The patient understands that monitoring is required and they must alert us or the primary physician if symptoms of infection or other concerning signs are noted.
Terbinafine Pregnancy And Lactation Text: This medication is Pregnancy Category B and is considered safe during pregnancy. It is also excreted in breast milk and breast feeding isn't recommended.
Spevigo Pregnancy And Lactation Text: The risk during pregnancy and breastfeeding is uncertain with this medication. This medication does cross the placenta. It is unknown if this medication is found in breast milk.
Methotrexate Counseling:  Patient counseled regarding adverse effects of methotrexate including but not limited to nausea, vomiting, abnormalities in liver function tests. Patients may develop mouth sores, rash, diarrhea, and abnormalities in blood counts. The patient understands that monitoring is required including LFT's and blood counts.  There is a rare possibility of scarring of the liver and lung problems that can occur when taking methotrexate. Persistent nausea, loss of appetite, pale stools, dark urine, cough, and shortness of breath should be reported immediately. Patient advised to discontinue methotrexate treatment at least three months before attempting to become pregnant.  I discussed the need for folate supplements while taking methotrexate.  These supplements can decrease side effects during methotrexate treatment. The patient verbalized understanding of the proper use and possible adverse effects of methotrexate.  All of the patient's questions and concerns were addressed.
Soolantra Counseling: I discussed with the patients the risks of topial Soolantra. This is a medicine which decreases the number of mites and inflammation in the skin. You experience burning, stinging, eye irritation or allergic reactions.  Please call our office if you develop any problems from using this medication.
Opioid Pregnancy And Lactation Text: These medications can lead to premature delivery and should be avoided during pregnancy. These medications are also present in breast milk in small amounts.
Azelaic Acid Counseling: Patient counseled that medicine may cause skin irritation and to avoid applying near the eyes.  In the event of skin irritation, the patient was advised to reduce the amount of the drug applied or use it less frequently.   The patient verbalized understanding of the proper use and possible adverse effects of azelaic acid.  All of the patient's questions and concerns were addressed.
Otezla Counseling: The side effects of Otezla were discussed with the patient, including but not limited to worsening or new depression, weight loss, diarrhea, nausea, upper respiratory tract infection, and headache. Patient instructed to call the office should any adverse effect occur.  The patient verbalized understanding of the proper use and possible adverse effects of Otezla.  All the patient's questions and concerns were addressed.
Niacinamide Pregnancy And Lactation Text: These medications are considered safe during pregnancy.
5-Fu Counseling: 5-Fluorouracil Counseling:  I discussed with the patient the risks of 5-fluorouracil including but not limited to erythema, scaling, itching, weeping, crusting, and pain.
Erivedge Counseling- I discussed with the patient the risks of Erivedge including but not limited to nausea, vomiting, diarrhea, constipation, weight loss, changes in the sense of taste, decreased appetite, muscle spasms, and hair loss.  The patient verbalized understanding of the proper use and possible adverse effects of Erivedge.  All of the patient's questions and concerns were addressed.
Olumiant Pregnancy And Lactation Text: Based on animal studies, Olumiant may cause embryo-fetal harm when administered to pregnant women.  The medication should not be used in pregnancy.  Breastfeeding is not recommended during treatment.
Gabapentin Counseling: I discussed with the patient the risks of gabapentin including but not limited to dizziness, somnolence, fatigue and ataxia.
Fluconazole Counseling:  Patient counseled regarding adverse effects of fluconazole including but not limited to headache, diarrhea, nausea, upset stomach, liver function test abnormalities, taste disturbance, and stomach pain.  There is a rare possibility of liver failure that can occur when taking fluconazole.  The patient understands that monitoring of LFTs and kidney function test may be required, especially at baseline. The patient verbalized understanding of the proper use and possible adverse effects of fluconazole.  All of the patient's questions and concerns were addressed.
Clindamycin Counseling: I counseled the patient regarding use of clindamycin as an antibiotic for prophylactic and/or therapeutic purposes. Clindamycin is active against numerous classes of bacteria, including skin bacteria. Side effects may include nausea, diarrhea, gastrointestinal upset, rash, hives, yeast infections, and in rare cases, colitis.
Rituxan Counseling:  I discussed with the patient the risks of Rituxan infusions. Side effects can include infusion reactions, severe drug rashes including mucocutaneous reactions, reactivation of latent hepatitis and other infections and rarely progressive multifocal leukoencephalopathy.  All of the patient's questions and concerns were addressed.
Finasteride Male Counseling: Finasteride Counseling:  I discussed with the patient the risks of use of finasteride including but not limited to decreased libido, decreased ejaculate volume, gynecomastia, and depression. Women should not handle medication.  All of the patient's questions and concerns were addressed.
Bexarotene Counseling:  I discussed with the patient the risks of bexarotene including but not limited to hair loss, dry lips/skin/eyes, liver abnormalities, hyperlipidemia, pancreatitis, depression/suicidal ideation, photosensitivity, drug rash/allergic reactions, hypothyroidism, anemia, leukopenia, infection, cataracts, and teratogenicity.  Patient understands that they will need regular blood tests to check lipid profile, liver function tests, white blood cell count, thyroid function tests and pregnancy test if applicable.
Winlevi Pregnancy And Lactation Text: This medication is considered safe during pregnancy and breastfeeding.
Imiquimod Counseling:  I discussed with the patient the risks of imiquimod including but not limited to erythema, scaling, itching, weeping, crusting, and pain.  Patient understands that the inflammatory response to imiquimod is variable from person to person and was educated regarded proper titration schedule.  If flu-like symptoms develop, patient knows to discontinue the medication and contact us.
Soolantra Pregnancy And Lactation Text: This medication is Pregnancy Category C. This medication is considered safe during breast feeding.
Topical Metronidazole Counseling: Metronidazole is a topical antibiotic medication. You may experience burning, stinging, redness, or allergic reactions.  Please call our office if you develop any problems from using this medication.
Nsaids Counseling: NSAID Counseling: I discussed with the patient that NSAIDs should be taken with food. Prolonged use of NSAIDs can result in the development of stomach ulcers.  Patient advised to stop taking NSAIDs if abdominal pain occurs.  The patient verbalized understanding of the proper use and possible adverse effects of NSAIDs.  All of the patient's questions and concerns were addressed.
Methotrexate Pregnancy And Lactation Text: This medication is Pregnancy Category X and is known to cause fetal harm. This medication is excreted in breast milk.
Stelara Counseling:  I discussed with the patient the risks of ustekinumab including but not limited to immunosuppression, malignancy, posterior leukoencephalopathy syndrome, and serious infections.  The patient understands that monitoring is required including a PPD at baseline and must alert us or the primary physician if symptoms of infection or other concerning signs are noted.
Tranexamic Acid Counseling:  Patient advised of the small risk of bleeding problems with tranexamic acid. They were also instructed to call if they developed any nausea, vomiting or diarrhea. All of the patient's questions and concerns were addressed.
Azathioprine Counseling:  I discussed with the patient the risks of azathioprine including but not limited to myelosuppression, immunosuppression, hepatotoxicity, lymphoma, and infections.  The patient understands that monitoring is required including baseline LFTs, Creatinine, possible TPMP genotyping and weekly CBCs for the first month and then every 2 weeks thereafter.  The patient verbalized understanding of the proper use and possible adverse effects of azathioprine.  All of the patient's questions and concerns were addressed.
Otezla Pregnancy And Lactation Text: This medication is Pregnancy Category C and it isn't known if it is safe during pregnancy. It is unknown if it is excreted in breast milk.
Adbry Counseling: I discussed with the patient the risks of tralokinumab including but not limited to eye infection and irritation, cold sores, injection site reactions, worsening of asthma, allergic reactions and increased risk of parasitic infection.  Live vaccines should be avoided while taking tralokinumab. The patient understands that monitoring is required and they must alert us or the primary physician if symptoms of infection or other concerning signs are noted.
Protopic Counseling: Patient may experience a mild burning sensation during topical application. Protopic is not approved in children less than 2 years of age. There have been case reports of hematologic and skin malignancies in patients using topical calcineurin inhibitors although causality is questionable.
Clindamycin Pregnancy And Lactation Text: This medication can be used in pregnancy if certain situations. Clindamycin is also present in breast milk.
Quinolones Counseling:  I discussed with the patient the risks of fluoroquinolones including but not limited to GI upset, allergic reaction, drug rash, diarrhea, dizziness, photosensitivity, yeast infections, liver function test abnormalities, tendonitis/tendon rupture.
Doxepin Counseling:  Patient advised that the medication is sedating and not to drive a car after taking this medication. Patient informed of potential adverse effects including but not limited to dry mouth, urinary retention, and blurry vision.  The patient verbalized understanding of the proper use and possible adverse effects of doxepin.  All of the patient's questions and concerns were addressed.
Rinvoq Counseling: I discussed with the patient the risks of Rinvoq therapy including but not limited to upper respiratory tract infections, shingles, cold sores, bronchitis, nausea, cough, fever, acne, and headache. Live vaccines should be avoided.  This medication has been linked to serious infections; higher rate of mortality; malignancy and lymphoproliferative disorders; major adverse cardiovascular events; thrombosis; thrombocytopenia, anemia, and neutropenia; lipid elevations; liver enzyme elevations; and gastrointestinal perforations.
Rituxan Pregnancy And Lactation Text: This medication is Pregnancy Category C and it isn't know if it is safe during pregnancy. It is unknown if this medication is excreted in breast milk but similar antibodies are known to be excreted.
Arava Counseling:  Patient counseled regarding adverse effects of Arava including but not limited to nausea, vomiting, abnormalities in liver function tests. Patients may develop mouth sores, rash, diarrhea, and abnormalities in blood counts. The patient understands that monitoring is required including LFTs and blood counts.  There is a rare possibility of scarring of the liver and lung problems that can occur when taking methotrexate. Persistent nausea, loss of appetite, pale stools, dark urine, cough, and shortness of breath should be reported immediately. Patient advised to discontinue Arava treatment and consult with a physician prior to attempting conception. The patient will have to undergo a treatment to eliminate Arava from the body prior to conception.
Bexarotene Pregnancy And Lactation Text: This medication is Pregnancy Category X and should not be given to women who are pregnant or may become pregnant. This medication should not be used if you are breast feeding.
Humira Counseling:  I discussed with the patient the risks of adalimumab including but not limited to myelosuppression, immunosuppression, autoimmune hepatitis, demyelinating diseases, lymphoma, and serious infections.  The patient understands that monitoring is required including a PPD at baseline and must alert us or the primary physician if symptoms of infection or other concerning signs are noted.
Finasteride Female Counseling: Finasteride Counseling:  I discussed with the patient the risks of use of finasteride including but not limited to decreased libido and sexual dysfunction. Explained the teratogenic nature of the medication and stressed the importance of not getting pregnant during treatment. All of the patient's questions and concerns were addressed.
Prednisone Counseling:  I discussed with the patient the risks of prolonged use of prednisone including but not limited to weight gain, insomnia, osteoporosis, mood changes, diabetes, susceptibility to infection, glaucoma and high blood pressure.  In cases where prednisone use is prolonged, patients should be monitored with blood pressure checks, serum glucose levels and an eye exam.  Additionally, the patient may need to be placed on GI prophylaxis, PCP prophylaxis, and calcium and vitamin D supplementation and/or a bisphosphonate.  The patient verbalized understanding of the proper use and the possible adverse effects of prednisone.  All of the patient's questions and concerns were addressed.
Tranexamic Acid Pregnancy And Lactation Text: It is unknown if this medication is safe during pregnancy or breast feeding.
VTAMA Counseling: I discussed with the patient that VTAMA is not for use in the eyes, mouth or mouth. They should call the office if they develop any signs of allergic reactions to VTAMA. The patient verbalized understanding of the proper use and possible adverse effects of VTAMA.  All of the patient's questions and concerns were addressed.
Drysol Counseling:  I discussed with the patient the risks of drysol/aluminum chloride including but not limited to skin rash, itching, irritation, burning.
Nsaids Pregnancy And Lactation Text: These medications are considered safe up to 30 weeks gestation. It is excreted in breast milk.
Topical Metronidazole Pregnancy And Lactation Text: This medication is Pregnancy Category B and considered safe during pregnancy.  It is also considered safe to use while breastfeeding.
Rinvoq Pregnancy And Lactation Text: Based on animal studies, Rinvoq may cause embryo-fetal harm when administered to pregnant women.  The medication should not be used in pregnancy.  Breastfeeding is not recommended during treatment and for 6 days after the last dose.
Adbry Pregnancy And Lactation Text: It is unknown if this medication will adversely affect pregnancy or breast feeding.
Libtayo Counseling- I discussed with the patient the risks of Libtayo including but not limited to nausea, vomiting, diarrhea, and bone or muscle pain.  The patient verbalized understanding of the proper use and possible adverse effects of Libtayo.  All of the patient's questions and concerns were addressed.
Topical Retinoid counseling:  Patient advised to apply a pea-sized amount only at bedtime and wait 30 minutes after washing their face before applying.  If too drying, patient may add a non-comedogenic moisturizer. The patient verbalized understanding of the proper use and possible adverse effects of retinoids.  All of the patient's questions and concerns were addressed.
Griseofulvin Counseling:  I discussed with the patient the risks of griseofulvin including but not limited to photosensitivity, cytopenia, liver damage, nausea/vomiting and severe allergy.  The patient understands that this medication is best absorbed when taken with a fatty meal (e.g., ice cream or french fries).
Siliq Counseling:  I discussed with the patient the risks of Siliq including but not limited to new or worsening depression, suicidal thoughts and behavior, immunosuppression, malignancy, posterior leukoencephalopathy syndrome, and serious infections.  The patient understands that monitoring is required including a PPD at baseline and must alert us or the primary physician if symptoms of infection or other concerning signs are noted. There is also a special program designed to monitor depression which is required with Siliq.
Oxybutynin Counseling:  I discussed with the patient the risks of oxybutynin including but not limited to skin rash, drowsiness, dry mouth, difficulty urinating, and blurred vision.
Protopic Pregnancy And Lactation Text: This medication is Pregnancy Category C. It is unknown if this medication is excreted in breast milk when applied topically.
Doxycycline Counseling:  Patient counseled regarding possible photosensitivity and increased risk for sunburn.  Patient instructed to avoid sunlight, if possible.  When exposed to sunlight, patients should wear protective clothing, sunglasses, and sunscreen.  The patient was instructed to call the office immediately if the following severe adverse effects occur:  hearing changes, easy bruising/bleeding, severe headache, or vision changes.  The patient verbalized understanding of the proper use and possible adverse effects of doxycycline.  All of the patient's questions and concerns were addressed.
Doxepin Pregnancy And Lactation Text: This medication is Pregnancy Category C and it isn't known if it is safe during pregnancy. It is also excreted in breast milk and breast feeding isn't recommended.
Finasteride Pregnancy And Lactation Text: This medication is absolutely contraindicated during pregnancy. It is unknown if it is excreted in breast milk.
Benzoyl Peroxide Counseling: Patient counseled that medicine may cause skin irritation and bleach clothing.  In the event of skin irritation, the patient was advised to reduce the amount of the drug applied or use it less frequently.   The patient verbalized understanding of the proper use and possible adverse effects of benzoyl peroxide.  All of the patient's questions and concerns were addressed.
Glycopyrrolate Counseling:  I discussed with the patient the risks of glycopyrrolate including but not limited to skin rash, drowsiness, dry mouth, difficulty urinating, and blurred vision.
Klisyri Counseling:  I discussed with the patient the risks of Klisyri including but not limited to erythema, scaling, itching, weeping, crusting, and pain.
Isotretinoin Counseling: Patient should get monthly blood tests, not donate blood, not drive at night if vision affected, not share medication, and not undergo elective surgery for 6 months after tx completed. Side effects reviewed, pt to contact office should one occur.
Rifampin Counseling: I discussed with the patient the risks of rifampin including but not limited to liver damage, kidney damage, red-orange body fluids, nausea/vomiting and severe allergy.
Valtrex Counseling: I discussed with the patient the risks of valacyclovir including but not limited to kidney damage, nausea, vomiting and severe allergy.  The patient understands that if the infection seems to be worsening or is not improving, they are to call.
Topical Steroids Counseling: I discussed with the patient that prolonged use of topical steroids can result in the increased appearance of superficial blood vessels (telangiectasias), lightening (hypopigmentation) and thinning of the skin (atrophy).  Patient understands to avoid using high potency steroids in skin folds, the groin or the face.  The patient verbalized understanding of the proper use and possible adverse effects of topical steroids.  All of the patient's questions and concerns were addressed.
Bimzelx Counseling:  I discussed with the patient the risks of Bimzelx including but not limited to depression, immunosuppression, allergic reactions and infections.  The patient understands that monitoring is required including a PPD at baseline and must alert us or the primary physician if symptoms of infection or other concerning signs are noted.
Olanzapine Counseling- I discussed with the patient the common side effects of olanzapine including but are not limited to: lack of energy, dry mouth, increased appetite, sleepiness, tremor, constipation, dizziness, changes in behavior, or restlessness.  Explained that teenagers are more likely to experience headaches, abdominal pain, pain in the arms or legs, tiredness, and sleepiness.  Serious side effects include but are not limited: increased risk of death in elderly patients who are confused, have memory loss, or dementia-related psychosis; hyperglycemia; increased cholesterol and triglycerides; and weight gain.

## 2024-10-09 ENCOUNTER — TELEPHONE (OUTPATIENT)
Dept: INTERNAL MEDICINE | Facility: CLINIC | Age: 54
End: 2024-10-09
Payer: COMMERCIAL

## 2024-10-09 DIAGNOSIS — D72.829 LEUKOCYTOSIS, UNSPECIFIED TYPE: Primary | ICD-10-CM

## 2024-10-09 NOTE — TELEPHONE ENCOUNTER
Patient stated that she was told that she needs to get her bloodwork rechecked.when she was finished with the   doxycycline hyclate which she is. Can a new blood work script be put in. The patient has an appointment at [facility] [[email or web address] tomorrow morning at 11:00am.      Hemigard Postcare Instructions: The HEMIGARD strips are to remain completely dry for at least 5-7 days.

## 2024-10-09 NOTE — TELEPHONE ENCOUNTER
Please notify pt that lab was ordered via portal.  If she can print it out, we can fax it over. Thanks.

## 2024-10-11 ENCOUNTER — OFFICE VISIT (OUTPATIENT)
Dept: INTERNAL MEDICINE | Facility: CLINIC | Age: 54
End: 2024-10-11
Payer: COMMERCIAL

## 2024-10-11 VITALS
RESPIRATION RATE: 17 BRPM | BODY MASS INDEX: 33.13 KG/M2 | OXYGEN SATURATION: 97 % | HEIGHT: 62 IN | HEART RATE: 73 BPM | DIASTOLIC BLOOD PRESSURE: 76 MMHG | TEMPERATURE: 98.4 F | WEIGHT: 180 LBS | SYSTOLIC BLOOD PRESSURE: 110 MMHG

## 2024-10-11 DIAGNOSIS — I45.10 RIGHT BUNDLE BRANCH BLOCK: ICD-10-CM

## 2024-10-11 DIAGNOSIS — R21 RASH: ICD-10-CM

## 2024-10-11 DIAGNOSIS — R61 EXCESSIVE SWEATING: Primary | ICD-10-CM

## 2024-10-11 DIAGNOSIS — E66.9 OBESITY (BMI 30-39.9): ICD-10-CM

## 2024-10-11 LAB
BASOPHILS # BLD AUTO: 43 CELLS/UL (ref 0–200)
BASOPHILS NFR BLD AUTO: 0.5 %
EOSINOPHIL # BLD AUTO: 224 CELLS/UL (ref 15–500)
EOSINOPHIL NFR BLD AUTO: 2.6 %
ERYTHROCYTE [DISTWIDTH] IN BLOOD BY AUTOMATED COUNT: 12.1 % (ref 11–15)
HCT VFR BLD AUTO: 43.8 % (ref 35–45)
HGB BLD-MCNC: 14.2 G/DL (ref 11.7–15.5)
LYMPHOCYTES # BLD AUTO: 1866 CELLS/UL (ref 850–3900)
LYMPHOCYTES NFR BLD AUTO: 21.7 %
MCH RBC QN AUTO: 29.8 PG (ref 27–33)
MCHC RBC AUTO-ENTMCNC: 32.4 G/DL (ref 32–36)
MCV RBC AUTO: 92 FL (ref 80–100)
MONOCYTES # BLD AUTO: 740 CELLS/UL (ref 200–950)
MONOCYTES NFR BLD AUTO: 8.6 %
NEUTROPHILS # BLD AUTO: 5728 CELLS/UL (ref 1500–7800)
NEUTROPHILS NFR BLD AUTO: 66.6 %
PLATELET # BLD AUTO: 332 THOUSAND/UL (ref 140–400)
PMV BLD REES-ECKER: 10.5 FL (ref 7.5–12.5)
RBC # BLD AUTO: 4.76 MILLION/UL (ref 3.8–5.1)
WBC # BLD AUTO: 8.6 THOUSAND/UL (ref 3.8–10.8)

## 2024-10-11 PROCEDURE — 3074F SYST BP LT 130 MM HG: CPT | Performed by: INTERNAL MEDICINE

## 2024-10-11 PROCEDURE — 3008F BODY MASS INDEX DOCD: CPT | Performed by: INTERNAL MEDICINE

## 2024-10-11 PROCEDURE — 3078F DIAST BP <80 MM HG: CPT | Performed by: INTERNAL MEDICINE

## 2024-10-11 PROCEDURE — 99214 OFFICE O/P EST MOD 30 MIN: CPT | Performed by: INTERNAL MEDICINE

## 2024-10-11 RX ORDER — NYSTATIN 100000 U/G
CREAM TOPICAL 2 TIMES DAILY
COMMUNITY

## 2024-10-11 RX ORDER — TRIAMCINOLONE ACETONIDE 1 MG/G
CREAM TOPICAL 2 TIMES DAILY
COMMUNITY

## 2024-10-11 ASSESSMENT — PATIENT HEALTH QUESTIONNAIRE - PHQ9: SUM OF ALL RESPONSES TO PHQ9 QUESTIONS 1 & 2: 0

## 2024-10-11 NOTE — PROGRESS NOTES
Subjective      Patient ID: Aurora Cedillo is a 54 y.o. female.    HPI    Patient presents for follow up.  Saw Derm for the rash under the breast.  Had an allergic reaction to the econazole cream. Was switched to triamcinolone w/nystatin which helped clear the rash.  Saw GYN a few days ago. Thought her excessive sweating could be perimenopausal symptoms. Will have hormone levels checked.  Declined estrogen for now.  Completed doxycycline yesterday for sweats and elevated WBC count.  Repeat labs normal. States she feels nearly back to normal. Would like to address her weight.  Eats healthy and exercises though unable to exercise much due to excessive sweating.  Would like to consider GLP-1 agonist. No other new/acute concerns.     The following have been reviewed and updated as appropriate in this visit:     Allergies  Meds  Problems         Past Medical History:   Diagnosis Date    Allergic rhinitis     Anxiety 5 years ago    Arthritis     Back pain     BMI 40.0-44.9, adult (CMS/[facility])     Cellulitis     COVID-19 virus infection 9/19/2022    Depression 06/28/2012    Dysfunction of both eustachian tubes     Essential hypertension, benign 06/28/2012    GERD (gastroesophageal reflux disease) After lap band    Gout 15-20 years ago    No longer have    High cholesterol 20 years ago    Hyperlipidemia     Irregular menses     Plantar fasciitis 20 years ago    Right bundle branch block 06/28/2012    Sensorineural hearing loss, bilateral     Shingles     on head, age 20's    Sinusitis     Skin abnormalities All my life    Several skin issues some related to allergies, stress    Skin rash     Sleep apnea     Snoring     UTI (urinary tract infection)     Varicose veins of lower extremity     Vitamin D deficiency     Yeast infection      Past Surgical History   Procedure Laterality Date    Carpal tunnel release  25 years ago    Corrected with surgery    Colonoscopy      Endometrial biopsy  2022    Knee arthroscopy  15 years  ago    Knee surgery  7 years ago    Laparoscopic gastric banding  15 years ago?    Unsure of exact timeframe    Partial knee arthroplasty Left     Sinus surgery      Vaginal delivery       Family History   Problem Relation Name Age of Onset    Atrial fibrillation Biological Mother Aurora Benson     Anxiety disorder Biological Mother Aurora Benson     Atrial fibrillation Biological Father Chris Conteh     Stroke Biological Father Chris Conteh         with cognitive impairment    Heart disease Biological Father Chris Conteh     Hypertension Biological Father Chris Conteh     Mental illness Biological Father Chris Conteh     Obesity Biological Father Chris Conteh     Atrial fibrillation Biological Brother      Stroke Biological Brother           of CVA age 52    ADD / ADHD Biological Daughter      Anxiety disorder Biological Daughter      No Known Problems Biological Son oldest     Autism spectrum disorder Biological Son youngest         high functioning    ADD / ADHD Biological Son youngest         oldest, declines meds     Social History     Socioeconomic History    Marital status:      Spouse name: None    Number of children: 3    Years of education: None    Highest education level: None   Occupational History    Occupation: [employer]   Tobacco Use    Smoking status: Former     Current packs/day: 0.00     Average packs/day: 0.5 packs/day for 5.0 years (2.5 ttl pk-yrs)     Types: Cigarettes     Start date: 1988     Quit date: 1993     Years since quittin.8    Smokeless tobacco: Never   Vaping Use    Vaping status: Never Used   Substance and Sexual Activity    Alcohol use: Yes     Comment: once in a while    Drug use: Not Currently    Sexual activity: Yes     Partners: Male     Birth control/protection: Abstinence, Coitus interruptus/Pulling Out, Condom Male     Comment: Unable to take pill due to side effects.   Social History Narrative    Children- 2 sons (19, 13), 1 daughter (16). 2024    Caffeine  - coffee 1 daily. Iced tea, unsweetened    Exercise- yoga , PT    Diet- intermittent fasting, weekdays    Pets- none.        Review of Systems   Constitutional:  Positive for activity change (decreased due to sweats), diaphoresis (overall improved) and unexpected weight change (increasing). Negative for fever.   Respiratory:  Negative for shortness of breath.    Cardiovascular:  Negative for chest pain.   Gastrointestinal:  Negative for abdominal pain.   Genitourinary:  Negative for difficulty urinating.   Musculoskeletal:  Negative for arthralgias.   Neurological:  Negative for dizziness and light-headedness.   Psychiatric/Behavioral:  Negative for sleep disturbance.        Allergies   Allergen Reactions    Neomycin Hives    Losartan Other (see comments)     Fatigue    Neomycin-Bacitracnzn-Polymyxnb Itching    Penicillins     Latex Rash    Miconazole Rash     Current Outpatient Medications   Medication Sig Dispense Refill    BIOTIN ORAL Take 5,000 mg by mouth daily.      buPROPion XL (WELLBUTRIN XL) 150 mg 24 hr tablet TAKE 1 TABLET DAILY. TAKE WITH 300 MG TABLETS (TOTAL 450 MG) 90 tablet 3    crisaborole (EUCRISA) 2 % ointment Eucrisa 2 % topical ointment      fluticasone propionate (FLONASE) 50 mcg/actuation nasal spray Administer 1 spray into each nostril daily. 16 g 3    hydrochlorothiazide (HYDRODIURIL) 25 mg tablet Take 0.5 tablets (12.5 mg total) by mouth daily. 90 tablet 1    ipratropium (ATROVENT) 42 mcg (0.06 %) nasal spray Administer 1-2 sprays into each nostril 4 (four) times a day as needed for rhinitis. 15 mL 1    Lactobacillus acidophilus (PROBIOTIC ORAL) Take by mouth daily.      levonorgestreL (LILETTA) 18.6 mcg/24 hrs (6 yrs) 52 mg intrauterine device intrauterine device 1 kit by intrauterine route once.      metFORMIN XR (GLUCOPHAGE-XR) 750 mg 24 hr tablet TAKE 2 TABLETS DAILY WITH DINNER 180 tablet 3    nystatin (MYCOSTATIN) 100,000 unit/gram cream Apply topically 2 (two) times a day.       "omeprazole (PriLOSEC) 40 mg capsule Take 1 capsule (40 mg total) by mouth daily before breakfast. 90 capsule 1    rosuvastatin (CRESTOR) 5 mg tablet Take 1 tablet (5 mg total) by mouth daily. 90 tablet 3    spironolactone (ALDACTONE) 100 mg tablet TAKE 1 TABLET DAILY 90 tablet 3    triamcinolone (KENALOG) 0.1 % cream Apply topically 2 (two) times a day.      buPROPion XL (WELLBUTRIN XL) 300 mg 24 hr tablet TAKE 1 TABLET DAILY. TAKE WITH 150 MG TABLETS (TOTAL 450 MG) (Patient not taking: Reported on 10/11/2024) 90 tablet 3    celecoxib (CeleBREX) 100 mg capsule Take 1 capsule (100 mg total) by mouth 2 (two) times a day. (Patient not taking: Reported on 10/11/2024) 60 capsule 1    chromium picolinate 200 mcg tablet Take by mouth. (Patient not taking: Reported on 10/11/2024)      clioquinol-hydrocortisone (ALA-GILLES) 3-0.5 % cream External (Patient not taking: Reported on 10/11/2024)      famotidine (PEPCID) 40 mg tablet Take 1 tablet (40 mg total) by mouth 2 (two) times a day. (Patient not taking: Reported on 10/11/2024) 180 tablet 1    L. acidophilus/pectin, citrus (ACIDOPHILUS PROBIOTIC ORAL) Take 0.5 mg by mouth.      semaglutide (WEGOVY) 0.25 mg/0.5 mL subcutaneous injection Inject 0.25 mg under the skin every (seven) 7 days. 2 mL 1     No current facility-administered medications for this visit.       Objective   Vitals:    10/11/24 1358   BP: 110/76   Pulse: 73   Resp: 17   Temp: 36.9 °C (98.4 °F)   TempSrc: Oral   SpO2: 97%   Weight: 81.6 kg (180 lb)   Height: 1.575 m (5' 2\")     Body mass index is 32.92 kg/m².    Physical Exam  Constitutional:       Appearance: Normal appearance. She is well-developed.   HENT:      Head: Normocephalic and atraumatic.   Eyes:      General: Lids are normal.   Cardiovascular:      Rate and Rhythm: Normal rate and regular rhythm.      Heart sounds: Normal heart sounds, S1 normal and S2 normal. No murmur heard.  Pulmonary:      Effort: Pulmonary effort is normal.      Breath sounds: " Normal breath sounds. No decreased breath sounds, rhonchi or rales.   Abdominal:      General: Bowel sounds are normal.      Palpations: Abdomen is soft.      Tenderness: There is no abdominal tenderness.   Musculoskeletal:      Cervical back: Neck supple.   Skin:     General: Skin is warm and dry.   Neurological:      General: No focal deficit present.      Mental Status: She is alert and oriented to person, place, and time.      Cranial Nerves: No cranial nerve deficit.      Gait: Gait normal.   Psychiatric:         Mood and Affect: Mood normal.         Behavior: Behavior normal.         Thought Content: Thought content normal.         Judgment: Judgment normal.         Assessment/Plan   Problem List Items Addressed This Visit          Circulatory    Right bundle branch block    Relevant Orders    Ambulatory referral to Cardiology       Endocrine/Metabolic    Obesity (BMI 30-39.9)     BMI 32.92. Weight 180 lbs. Continue metformin XR 750mg daily, started by [facility]. Would like to start GLP-1 agonist. Counseled on potential risks and side effects. Will start wegovy 0.25mg weekly. F/u in 4 weeks            Dermatologic    Excessive sweating - Primary     Improved with course of doxycycline. WBC count improved. Rash also improved with nystatin-triamcinolone cream. Labs pending with GYN to evaluate for perimenopause. R.hip pain also improved.          Rash     Rash under breasts improved with triamcinolone-nystatin. Had an allergic reaction to econazole cream.          Relevant Medications    triamcinolone (KENALOG) 0.1 % cream    nystatin (MYCOSTATIN) 100,000 unit/gram cream       Caren Guardado MD    10/14/2024

## 2024-10-14 PROBLEM — R21 RASH: Status: ACTIVE | Noted: 2024-10-14

## 2024-10-14 ASSESSMENT — ENCOUNTER SYMPTOMS
ACTIVITY CHANGE: 1
DIAPHORESIS: 1
SHORTNESS OF BREATH: 0
ARTHRALGIAS: 0
SLEEP DISTURBANCE: 0
LIGHT-HEADEDNESS: 0
DIZZINESS: 0
ABDOMINAL PAIN: 0
UNEXPECTED WEIGHT CHANGE: 1
FEVER: 0
DIFFICULTY URINATING: 0

## 2024-10-15 NOTE — ASSESSMENT & PLAN NOTE
BMI 32.92. Weight 180 lbs. Continue metformin XR 750mg daily, started by [facility]. Would like to start GLP-1 agonist. Counseled on potential risks and side effects. Will start wegovy 0.25mg weekly. F/u in 4 weeks

## 2024-10-15 NOTE — ASSESSMENT & PLAN NOTE
Rash under breasts improved with triamcinolone-nystatin. Had an allergic reaction to econazole cream.

## 2024-10-15 NOTE — ASSESSMENT & PLAN NOTE
Improved with course of doxycycline. WBC count improved. Rash also improved with nystatin-triamcinolone cream. Labs pending with GYN to evaluate for perimenopause. R.hip pain also improved.

## 2024-10-16 ENCOUNTER — OFFICE VISIT (OUTPATIENT)
Dept: INTERNAL MEDICINE | Facility: CLINIC | Age: 54
End: 2024-10-16
Payer: COMMERCIAL

## 2024-10-16 VITALS
RESPIRATION RATE: 16 BRPM | SYSTOLIC BLOOD PRESSURE: 120 MMHG | DIASTOLIC BLOOD PRESSURE: 82 MMHG | WEIGHT: 181 LBS | BODY MASS INDEX: 33.11 KG/M2

## 2024-10-16 DIAGNOSIS — R61 EXCESSIVE SWEATING: Primary | ICD-10-CM

## 2024-10-16 DIAGNOSIS — E66.9 OBESITY (BMI 30-39.9): ICD-10-CM

## 2024-10-16 LAB
ESTROGEN SERPL-MCNC: 99 PG/ML
FSH SERPL-ACNC: 7 MIU/ML
LH SERPL-ACNC: 4.5 MIU/ML
PROGEST SERPL-MCNC: <0.5 NG/ML

## 2024-10-16 PROCEDURE — 3079F DIAST BP 80-89 MM HG: CPT | Performed by: INTERNAL MEDICINE

## 2024-10-16 PROCEDURE — 99212 OFFICE O/P EST SF 10 MIN: CPT | Performed by: INTERNAL MEDICINE

## 2024-10-16 PROCEDURE — 3008F BODY MASS INDEX DOCD: CPT | Performed by: INTERNAL MEDICINE

## 2024-10-16 PROCEDURE — 3074F SYST BP LT 130 MM HG: CPT | Performed by: INTERNAL MEDICINE

## 2024-10-16 RX ORDER — TIRZEPATIDE 2.5 MG/.5ML
2.5 INJECTION, SOLUTION SUBCUTANEOUS
Qty: 2 ML | Refills: 1 | Status: SHIPPED | OUTPATIENT
Start: 2024-10-16 | End: 2024-10-21

## 2024-10-16 ASSESSMENT — ENCOUNTER SYMPTOMS
UNEXPECTED WEIGHT CHANGE: 0
NERVOUS/ANXIOUS: 1
DIFFICULTY URINATING: 0
DIAPHORESIS: 1
FEVER: 0
SHORTNESS OF BREATH: 0
ABDOMINAL PAIN: 0

## 2024-10-16 NOTE — ASSESSMENT & PLAN NOTE
BMI increased slightly to 33.11, weight 181 lbs. Taking metformin XR 750mg daily.  GLP-1 agonist not covered.  Wegovy not available at pharmacy due to being out of stock. Pt will try looking into coupons or discount through [employer] for Zepbound. Has an appt with cardiologist next week. Will get clearance for phentermine for future if she opts for it.

## 2024-10-16 NOTE — PROGRESS NOTES
Subjective      Patient ID: Aurora Cedillo is a 54 y.o. female.    HPI    Patient presents for follow up.  Was prescribed Wegovy but not covered by insurance. Cost is prohibitive.  Contrave and Phentermine are lower cost but still high. Takes Wellbutrin 450mg for anxiety which has been working well. Working on healthy diet. R.hip pain is improved after injection. Sweating is persistent but only mild with exertion. No other new/acute concerns.       The following have been reviewed and updated as appropriate in this visit:     Allergies  Meds  Problems         Past Medical History:   Diagnosis Date    Allergic rhinitis     Anxiety 5 years ago    Arthritis     Back pain     BMI 40.0-44.9, adult (CMS/[facility])     Cellulitis     COVID-19 virus infection 9/19/2022    Depression 06/28/2012    Dysfunction of both eustachian tubes     Essential hypertension, benign 06/28/2012    GERD (gastroesophageal reflux disease) After lap band    Gout 15-20 years ago    No longer have    High cholesterol 20 years ago    Hyperlipidemia     Irregular menses     Plantar fasciitis 20 years ago    Right bundle branch block 06/28/2012    Sensorineural hearing loss, bilateral     Shingles     on head, age 20's    Sinusitis     Skin abnormalities All my life    Several skin issues some related to allergies, stress    Skin rash     Sleep apnea     Snoring     UTI (urinary tract infection)     Varicose veins of lower extremity     Vitamin D deficiency     Yeast infection      Past Surgical History   Procedure Laterality Date    Carpal tunnel release  25 years ago    Corrected with surgery    Colonoscopy      Endometrial biopsy  2022    Knee arthroscopy  15 years ago    Knee surgery  7 years ago    Laparoscopic gastric banding  15 years ago?    Unsure of exact timeframe    Partial knee arthroplasty Left     Sinus surgery      Vaginal delivery       Family History   Problem Relation Name Age of Onset    Atrial fibrillation Biological Mother Aurora  Elayne Benson     Anxiety disorder Biological Mother Aurora Benson     Atrial fibrillation Biological Father Chris Conteh     Stroke Biological Father Chris Conteh         with cognitive impairment    Heart disease Biological Father Chris Conteh     Hypertension Biological Father Chris Conteh     Mental illness Biological Father Chris Conteh     Obesity Biological Father Chris Conteh     Atrial fibrillation Biological Brother      Stroke Biological Brother           of CVA age 52    ADD / ADHD Biological Daughter      Anxiety disorder Biological Daughter      No Known Problems Biological Son oldest     Autism spectrum disorder Biological Son youngest         high functioning    ADD / ADHD Biological Son youngest         oldest, declines meds     Social History     Socioeconomic History    Marital status:      Spouse name: None    Number of children: 3    Years of education: None    Highest education level: None   Occupational History    Occupation: [employer]   Tobacco Use    Smoking status: Former     Current packs/day: 0.00     Average packs/day: 0.5 packs/day for 5.0 years (2.5 ttl pk-yrs)     Types: Cigarettes     Start date: 1988     Quit date: 1993     Years since quittin.8    Smokeless tobacco: Never   Vaping Use    Vaping status: Never Used   Substance and Sexual Activity    Alcohol use: Yes     Comment: once in a while    Drug use: Not Currently    Sexual activity: Yes     Partners: Male     Birth control/protection: Abstinence, Coitus interruptus/Pulling Out, Condom Male     Comment: Unable to take pill due to side effects.   Social History Narrative    Children- 2 sons (19, 13), 1 daughter (16). 2024    Caffeine - coffee 1 daily. Iced tea, unsweetened    Exercise- yoga , PT    Diet- intermittent fasting, weekdays    Pets- none.        Review of Systems   Constitutional:  Positive for diaphoresis. Negative for fever and unexpected weight change.   Respiratory:  Negative for shortness of breath.     Cardiovascular:  Negative for chest pain.   Gastrointestinal:  Negative for abdominal pain.   Genitourinary:  Negative for difficulty urinating.   Psychiatric/Behavioral:  The patient is nervous/anxious.        Allergies   Allergen Reactions    Neomycin Hives    Losartan Other (see comments)     Fatigue    Neomycin-Bacitracnzn-Polymyxnb Itching    Penicillins     Latex Rash    Miconazole Rash     Current Outpatient Medications   Medication Sig Dispense Refill    fluticasone propionate (FLONASE) 50 mcg/actuation nasal spray Administer 1 spray into each nostril daily. 16 g 3    hydrochlorothiazide (HYDRODIURIL) 25 mg tablet Take 0.5 tablets (12.5 mg total) by mouth daily. 90 tablet 1    ipratropium (ATROVENT) 42 mcg (0.06 %) nasal spray Administer 1-2 sprays into each nostril 4 (four) times a day as needed for rhinitis. 15 mL 1    L. acidophilus/pectin, citrus (ACIDOPHILUS PROBIOTIC ORAL) Take 0.5 mg by mouth.      Lactobacillus acidophilus (PROBIOTIC ORAL) Take by mouth daily.      levonorgestreL (LILETTA) 18.6 mcg/24 hrs (6 yrs) 52 mg intrauterine device intrauterine device 1 kit by intrauterine route once.      metFORMIN XR (GLUCOPHAGE-XR) 750 mg 24 hr tablet TAKE 2 TABLETS DAILY WITH DINNER 180 tablet 3    nystatin (MYCOSTATIN) 100,000 unit/gram cream Apply topically 2 (two) times a day.      omeprazole (PriLOSEC) 40 mg capsule Take 1 capsule (40 mg total) by mouth daily before breakfast. 90 capsule 1    rosuvastatin (CRESTOR) 5 mg tablet Take 1 tablet (5 mg total) by mouth daily. 90 tablet 3    spironolactone (ALDACTONE) 100 mg tablet TAKE 1 TABLET DAILY 90 tablet 3    tirzepatide, weight loss, (ZEPBOUND) 2.5 mg/0.5 mL subcutaneous PEN injector Inject 0.5 mL (2.5 mg total) under the skin every (seven) 7 days. 2 mL 1    triamcinolone (KENALOG) 0.1 % cream Apply topically 2 (two) times a day.      BIOTIN ORAL Take 5,000 mg by mouth daily.      buPROPion XL (WELLBUTRIN XL) 150 mg 24 hr tablet TAKE 1 TABLET DAILY.  TAKE WITH 300 MG TABLETS (TOTAL 450 MG) 90 tablet 3    buPROPion XL (WELLBUTRIN XL) 300 mg 24 hr tablet TAKE 1 TABLET DAILY. TAKE WITH 150 MG TABLETS (TOTAL 450 MG) (Patient not taking: Reported on 10/11/2024) 90 tablet 3    celecoxib (CeleBREX) 100 mg capsule Take 1 capsule (100 mg total) by mouth 2 (two) times a day. (Patient not taking: Reported on 10/11/2024) 60 capsule 1    chromium picolinate 200 mcg tablet Take by mouth. (Patient not taking: Reported on 10/11/2024)      clioquinol-hydrocortisone (ALA-GILLES) 3-0.5 % cream External (Patient not taking: Reported on 10/11/2024)      crisaborole (EUCRISA) 2 % ointment Eucrisa 2 % topical ointment      famotidine (PEPCID) 40 mg tablet Take 1 tablet (40 mg total) by mouth 2 (two) times a day. (Patient not taking: Reported on 10/11/2024) 180 tablet 1     No current facility-administered medications for this visit.       Objective   Vitals:    10/16/24 1105   BP: 120/82   Resp: 16   Weight: 82.1 kg (181 lb)     Body mass index is 33.11 kg/m².    Physical Exam  Constitutional:       General: She is not in acute distress.     Appearance: She is well-developed.   HENT:      Head: Normocephalic and atraumatic.   Pulmonary:      Effort: Pulmonary effort is normal.   Musculoskeletal:         General: Normal range of motion.   Skin:     General: Skin is warm and dry.   Neurological:      Mental Status: She is alert and oriented to person, place, and time.   Psychiatric:         Thought Content: Thought content normal.         Judgment: Judgment normal.         Assessment/Plan   Problem List Items Addressed This Visit          Endocrine/Metabolic    Obesity (BMI 30-39.9)     BMI increased slightly to 33.11, weight 181 lbs. Taking metformin XR 750mg daily.  GLP-1 agonist not covered.  Wegovy not available at pharmacy due to being out of stock. Pt will try looking into coupons or discount through [employer] for Zepbound. Has an appt with cardiologist next week. Will get clearance  for phentermine for future if she opts for it.          Relevant Medications    tirzepatide, weight loss, (ZEPBOUND) 2.5 mg/0.5 mL subcutaneous PEN injector       Dermatologic    Excessive sweating - Primary     Overall improved, but still getting mild episodes. Will continue to monitor              Caren Guardado MD    10/16/2024

## 2024-10-18 ENCOUNTER — TELEPHONE (OUTPATIENT)
Dept: INTERNAL MEDICINE | Facility: CLINIC | Age: 54
End: 2024-10-18

## 2024-10-18 NOTE — TELEPHONE ENCOUNTER
Patient would like wegovy to be sent to [facility].  It has to say substitution permitted or compound.

## 2024-11-11 RX ORDER — OMEPRAZOLE 40 MG/1
CAPSULE, DELAYED RELEASE ORAL
Qty: 90 CAPSULE | Refills: 3 | Status: SHIPPED | OUTPATIENT
Start: 2024-11-11

## 2024-11-11 NOTE — TELEPHONE ENCOUNTER
Medicine Refill Request    Last Office Visit: 10/16/2024   Last Consult Visit: Visit date not found  Last Telemedicine Visit: 1/12/2024 Arabella Johnson CRNP    Next Appointment: Visit date not found      Current Outpatient Medications:     BIOTIN ORAL, Take 5,000 mg by mouth daily., Disp: , Rfl:     buPROPion XL (WELLBUTRIN XL) 150 mg 24 hr tablet, TAKE 1 TABLET DAILY. TAKE WITH 300 MG TABLETS (TOTAL 450 MG), Disp: 90 tablet, Rfl: 3    buPROPion XL (WELLBUTRIN XL) 300 mg 24 hr tablet, TAKE 1 TABLET DAILY. TAKE WITH 150 MG TABLETS (TOTAL 450 MG) (Patient not taking: Reported on 10/11/2024), Disp: 90 tablet, Rfl: 3    celecoxib (CeleBREX) 100 mg capsule, Take 1 capsule (100 mg total) by mouth 2 (two) times a day. (Patient not taking: Reported on 10/11/2024), Disp: 60 capsule, Rfl: 1    chromium picolinate 200 mcg tablet, Take by mouth. (Patient not taking: Reported on 10/11/2024), Disp: , Rfl:     clioquinol-hydrocortisone (ALA-GILLES) 3-0.5 % cream, External (Patient not taking: Reported on 10/11/2024), Disp: , Rfl:     crisaborole (EUCRISA) 2 % ointment, Eucrisa 2 % topical ointment, Disp: , Rfl:     famotidine (PEPCID) 40 mg tablet, Take 1 tablet (40 mg total) by mouth 2 (two) times a day. (Patient not taking: Reported on 10/11/2024), Disp: 180 tablet, Rfl: 1    fluticasone propionate (FLONASE) 50 mcg/actuation nasal spray, Administer 1 spray into each nostril daily., Disp: 16 g, Rfl: 3    hydrochlorothiazide (HYDRODIURIL) 25 mg tablet, Take 0.5 tablets (12.5 mg total) by mouth daily., Disp: 90 tablet, Rfl: 1    ipratropium (ATROVENT) 42 mcg (0.06 %) nasal spray, Administer 1-2 sprays into each nostril 4 (four) times a day as needed for rhinitis., Disp: 15 mL, Rfl: 1    L. acidophilus/pectin, citrus (ACIDOPHILUS PROBIOTIC ORAL), Take 0.5 mg by mouth., Disp: , Rfl:     Lactobacillus acidophilus (PROBIOTIC ORAL), Take by mouth daily., Disp: , Rfl:     levonorgestreL (LILETTA) 18.6 mcg/24 hrs (6 yrs) 52 mg  "intrauterine device intrauterine device, 1 kit by intrauterine route once., Disp: , Rfl:     metFORMIN XR (GLUCOPHAGE-XR) 750 mg 24 hr tablet, TAKE 2 TABLETS DAILY WITH DINNER, Disp: 180 tablet, Rfl: 3    nystatin (MYCOSTATIN) 100,000 unit/gram cream, Apply topically 2 (two) times a day., Disp: , Rfl:     omeprazole (PriLOSEC) 40 mg capsule, Take 1 capsule (40 mg total) by mouth daily before breakfast., Disp: 90 capsule, Rfl: 1    rosuvastatin (CRESTOR) 5 mg tablet, Take 1 tablet (5 mg total) by mouth daily., Disp: 90 tablet, Rfl: 3    semaglutide (WEGOVY) 0.25 mg/0.5 mL subcutaneous injection, Inject 0.25 mg under the skin every (seven) 7 days., Disp: 2 mL, Rfl: 3    spironolactone (ALDACTONE) 100 mg tablet, TAKE 1 TABLET DAILY, Disp: 90 tablet, Rfl: 3    triamcinolone (KENALOG) 0.1 % cream, Apply topically 2 (two) times a day., Disp: , Rfl:       BP Readings from Last 3 Encounters:   10/16/24 120/82   10/11/24 110/76   09/27/24 110/74       Recent Lab results:  Lab Results   Component Value Date    CHOL 165 02/28/2024   ,   Lab Results   Component Value Date    HDL 62 02/28/2024   ,   Lab Results   Component Value Date    LDLCALC 85 02/28/2024   ,   Lab Results   Component Value Date    TRIG 102 02/28/2024        Lab Results   Component Value Date    GLUCOSE 72 10/03/2024    GLUCOSE NEGATIVE 10/03/2024   , No results found for: \"HGBA1C\"      Lab Results   Component Value Date    CREATININE 0.68 10/03/2024       Lab Results   Component Value Date    TSH 2.91 09/27/2024         No results found for: \"HGBA1C\"  " None

## 2024-11-30 DIAGNOSIS — M25.551 RIGHT HIP PAIN: ICD-10-CM

## 2024-12-02 NOTE — TELEPHONE ENCOUNTER
Medicine Refill Request    Last Office Visit: 10/16/2024   Last Consult Visit: Visit date not found  Last Telemedicine Visit: 1/12/2024 Arabella Johnson CRNP    Next Appointment: Visit date not found      Current Outpatient Medications:     BIOTIN ORAL, Take 5,000 mg by mouth daily., Disp: , Rfl:     buPROPion XL (WELLBUTRIN XL) 150 mg 24 hr tablet, TAKE 1 TABLET DAILY. TAKE WITH 300 MG TABLETS (TOTAL 450 MG), Disp: 90 tablet, Rfl: 3    buPROPion XL (WELLBUTRIN XL) 300 mg 24 hr tablet, TAKE 1 TABLET DAILY. TAKE WITH 150 MG TABLETS (TOTAL 450 MG) (Patient not taking: Reported on 10/11/2024), Disp: 90 tablet, Rfl: 3    celecoxib (CeleBREX) 100 mg capsule, Take 1 capsule (100 mg total) by mouth 2 (two) times a day. (Patient not taking: Reported on 10/11/2024), Disp: 60 capsule, Rfl: 1    chromium picolinate 200 mcg tablet, Take by mouth. (Patient not taking: Reported on 10/11/2024), Disp: , Rfl:     clioquinol-hydrocortisone (ALA-GILLES) 3-0.5 % cream, External (Patient not taking: Reported on 10/11/2024), Disp: , Rfl:     crisaborole (EUCRISA) 2 % ointment, Eucrisa 2 % topical ointment, Disp: , Rfl:     famotidine (PEPCID) 40 mg tablet, Take 1 tablet (40 mg total) by mouth 2 (two) times a day. (Patient not taking: Reported on 10/11/2024), Disp: 180 tablet, Rfl: 1    fluticasone propionate (FLONASE) 50 mcg/actuation nasal spray, Administer 1 spray into each nostril daily., Disp: 16 g, Rfl: 3    hydrochlorothiazide (HYDRODIURIL) 25 mg tablet, Take 0.5 tablets (12.5 mg total) by mouth daily., Disp: 90 tablet, Rfl: 1    ipratropium (ATROVENT) 42 mcg (0.06 %) nasal spray, Administer 1-2 sprays into each nostril 4 (four) times a day as needed for rhinitis., Disp: 15 mL, Rfl: 1    L. acidophilus/pectin, citrus (ACIDOPHILUS PROBIOTIC ORAL), Take 0.5 mg by mouth., Disp: , Rfl:     Lactobacillus acidophilus (PROBIOTIC ORAL), Take by mouth daily., Disp: , Rfl:     levonorgestreL (LILETTA) 18.6 mcg/24 hrs (6 yrs) 52 mg  "intrauterine device intrauterine device, 1 kit by intrauterine route once., Disp: , Rfl:     metFORMIN XR (GLUCOPHAGE-XR) 750 mg 24 hr tablet, TAKE 2 TABLETS DAILY WITH DINNER, Disp: 180 tablet, Rfl: 3    nystatin (MYCOSTATIN) 100,000 unit/gram cream, Apply topically 2 (two) times a day., Disp: , Rfl:     omeprazole (PriLOSEC) 40 mg capsule, TAKE 1 CAPSULE DAILY BEFORE BREAKFAST, Disp: 90 capsule, Rfl: 3    rosuvastatin (CRESTOR) 5 mg tablet, Take 1 tablet (5 mg total) by mouth daily., Disp: 90 tablet, Rfl: 3    semaglutide (WEGOVY) 0.25 mg/0.5 mL subcutaneous injection, Inject 0.25 mg under the skin every (seven) 7 days., Disp: 2 mL, Rfl: 3    spironolactone (ALDACTONE) 100 mg tablet, TAKE 1 TABLET DAILY, Disp: 90 tablet, Rfl: 3    triamcinolone (KENALOG) 0.1 % cream, Apply topically 2 (two) times a day., Disp: , Rfl:       BP Readings from Last 3 Encounters:   10/16/24 120/82   10/11/24 110/76   09/27/24 110/74       Recent Lab results:  Lab Results   Component Value Date    CHOL 165 02/28/2024   ,   Lab Results   Component Value Date    HDL 62 02/28/2024   ,   Lab Results   Component Value Date    LDLCALC 85 02/28/2024   ,   Lab Results   Component Value Date    TRIG 102 02/28/2024        Lab Results   Component Value Date    GLUCOSE 72 10/03/2024    GLUCOSE NEGATIVE 10/03/2024   , No results found for: \"HGBA1C\"      Lab Results   Component Value Date    CREATININE 0.68 10/03/2024       Lab Results   Component Value Date    TSH 2.91 09/27/2024         No results found for: \"HGBA1C\"  "

## 2024-12-03 RX ORDER — CELECOXIB 100 MG/1
100 CAPSULE ORAL 2 TIMES DAILY
Qty: 60 CAPSULE | Refills: 1 | Status: SHIPPED | OUTPATIENT
Start: 2024-12-03 | End: 2025-01-15 | Stop reason: ALTCHOICE

## 2025-01-03 ENCOUNTER — OFFICE VISIT (OUTPATIENT)
Dept: INTERNAL MEDICINE | Facility: CLINIC | Age: 55
End: 2025-01-03
Payer: COMMERCIAL

## 2025-01-03 VITALS
WEIGHT: 171 LBS | DIASTOLIC BLOOD PRESSURE: 80 MMHG | RESPIRATION RATE: 16 BRPM | OXYGEN SATURATION: 98 % | BODY MASS INDEX: 31.28 KG/M2 | SYSTOLIC BLOOD PRESSURE: 138 MMHG | HEART RATE: 90 BPM

## 2025-01-03 DIAGNOSIS — I70.90 ARTERIOSCLEROSIS: ICD-10-CM

## 2025-01-03 DIAGNOSIS — M25.551 RIGHT HIP PAIN: Primary | ICD-10-CM

## 2025-01-03 DIAGNOSIS — E66.9 OBESITY (BMI 30-39.9): ICD-10-CM

## 2025-01-03 PROCEDURE — 99214 OFFICE O/P EST MOD 30 MIN: CPT | Performed by: INTERNAL MEDICINE

## 2025-01-03 PROCEDURE — 3008F BODY MASS INDEX DOCD: CPT | Performed by: INTERNAL MEDICINE

## 2025-01-03 PROCEDURE — 3079F DIAST BP 80-89 MM HG: CPT | Performed by: INTERNAL MEDICINE

## 2025-01-03 PROCEDURE — 3075F SYST BP GE 130 - 139MM HG: CPT | Performed by: INTERNAL MEDICINE

## 2025-01-03 ASSESSMENT — ENCOUNTER SYMPTOMS
ARTHRALGIAS: 1
FEVER: 0
UNEXPECTED WEIGHT CHANGE: 0
SHORTNESS OF BREATH: 0
ABDOMINAL PAIN: 0
DIFFICULTY URINATING: 0

## 2025-01-03 ASSESSMENT — PATIENT HEALTH QUESTIONNAIRE - PHQ9: SUM OF ALL RESPONSES TO PHQ9 QUESTIONS 1 & 2: 0

## 2025-01-03 NOTE — PROGRESS NOTES
Subjective      Patient ID: Aurora Cedillo is a 54 y.o. female.    HPI    Patient presents for follow-up.  Was having severe right hip pain.  Was found to have a complete tear of the right distal gluteus medius tendon, moderate right-sided greater trochanteric bursitis and partial tear of right gluteus minimus tendon.  Has been taking Celebrex.  Had severe pain yesterday but a little better today.  Has an appointment with orthopedics, Dr. Ngo at [facility] in [facility].    Has been getting compound zepbound through an online [facility].  First dose was on 11/29/24.  Currently on 7.5 mg weekly.  Lost 12 lbs since taking it.  Saw cardiologist at [facility] for extensive family history of A-fib and CVA.  Underwent multiple cardiac testing and was found to have arteriosclerosis of her coronary arteries.  Rosuvastatin was increased from 5 mg to 20 mg daily.  Has blood test orders to repeat labs.  No other new/acute concerns.      The following have been reviewed and updated as appropriate in this visit:     Allergies  Meds  Problems         Past Medical History:   Diagnosis Date    Allergic rhinitis     Anxiety 5 years ago    Arteriosclerosis 01/03/2025    Arthritis     Back pain     BMI 40.0-44.9, adult (CMS/[facility])     Cellulitis     COVID-19 virus infection 09/19/2022    Depression 06/28/2012    Dysfunction of both eustachian tubes     Essential hypertension, benign 06/28/2012    GERD (gastroesophageal reflux disease) After lap band    Gout 15-20 years ago    No longer have    High cholesterol 20 years ago    Hyperlipidemia     Irregular menses     Plantar fasciitis 20 years ago    Right bundle branch block 06/28/2012    Sensorineural hearing loss, bilateral     Shingles     on head, age 20's    Sinusitis     Skin abnormalities All my life    Several skin issues some related to allergies, stress    Skin rash     Sleep apnea     Snoring     UTI (urinary tract infection)      Varicose veins of lower extremity     Vitamin D deficiency     Yeast infection      Past Surgical History   Procedure Laterality Date    Carpal tunnel release  25 years ago    Corrected with surgery    Colonoscopy      Endometrial biopsy      Knee arthroscopy  15 years ago    Knee surgery  7 years ago    Laparoscopic gastric banding  15 years ago?    Unsure of exact timeframe    Partial knee arthroplasty Left     Sinus surgery      Vaginal delivery       Family History   Problem Relation Name Age of Onset    Atrial fibrillation Biological Mother Aurora Benson     Anxiety disorder Biological Mother Aurora Benson     Atrial fibrillation Biological Father Chris Conteh     Stroke Biological Father Chris Cotneh         with cognitive impairment    Heart disease Biological Father Chris Conteh     Hypertension Biological Father Chris Conteh     Mental illness Biological Father Chris Conteh     Obesity Biological Father Chris Conteh     Atrial fibrillation Biological Brother      Stroke Biological Brother           of CVA age 52    ADD / ADHD Biological Daughter      Anxiety disorder Biological Daughter      No Known Problems Biological Son oldest     Autism spectrum disorder Biological Son youngest         high functioning    ADD / ADHD Biological Son youngest         oldest, declines meds     Social History     Socioeconomic History    Marital status:      Spouse name: None    Number of children: 3    Years of education: None    Highest education level: None   Occupational History    Occupation: [employer]   Tobacco Use    Smoking status: Former     Current packs/day: 0.00     Average packs/day: 0.5 packs/day for 5.0 years (2.5 ttl pk-yrs)     Types: Cigarettes     Start date: 1988     Quit date: 1993     Years since quittin.0    Smokeless tobacco: Never   Vaping Use    Vaping status: Never Used   Substance and Sexual Activity    Alcohol use: Yes     Comment: once in a while    Drug use: Not Currently     Sexual activity: Yes     Partners: Male     Birth control/protection: Abstinence, Coitus interruptus/Pulling Out, Condom Male     Comment: Unable to take pill due to side effects.   Social History Narrative    Children- 2 sons (19, 13), 1 daughter (16). 9/2024    Caffeine - coffee 1 daily. Iced tea, unsweetened    Exercise- yoga , PT    Diet- intermittent fasting, weekdays    Pets- none.        Review of Systems   Constitutional:  Negative for fever and unexpected weight change (Decreased).   Respiratory:  Negative for shortness of breath.    Cardiovascular:  Negative for chest pain.   Gastrointestinal:  Negative for abdominal pain.   Genitourinary:  Negative for difficulty urinating.   Musculoskeletal:  Positive for arthralgias (Right hip) and gait problem.       Allergies   Allergen Reactions    Neomycin Hives    Losartan Other (see comments)     Fatigue    Neomycin-Bacitracnzn-Polymyxnb Itching    Penicillins     Latex Rash    Miconazole Rash     Current Outpatient Medications   Medication Sig Dispense Refill    BIOTIN ORAL Take 5,000 mg by mouth daily.      buPROPion XL (WELLBUTRIN XL) 150 mg 24 hr tablet TAKE 1 TABLET DAILY. TAKE WITH 300 MG TABLETS (TOTAL 450 MG) 90 tablet 3    buPROPion XL (WELLBUTRIN XL) 300 mg 24 hr tablet TAKE 1 TABLET DAILY. TAKE WITH 150 MG TABLETS (TOTAL 450 MG) 90 tablet 3    celecoxib (celeBREX) 100 mg capsule TAKE 1 CAPSULE BY MOUTH TWICE A DAY 60 capsule 1    clioquinol-hydrocortisone (ALA-GILLES) 3-0.5 % cream       crisaborole (EUCRISA) 2 % ointment Eucrisa 2 % topical ointment      fluticasone propionate (FLONASE) 50 mcg/actuation nasal spray Administer 1 spray into each nostril daily. 16 g 3    hydrochlorothiazide (HYDRODIURIL) 25 mg tablet Take 0.5 tablets (12.5 mg total) by mouth daily. 90 tablet 1    ipratropium (ATROVENT) 42 mcg (0.06 %) nasal spray Administer 1-2 sprays into each nostril 4 (four) times a day as needed for rhinitis. 15 mL 1    L. acidophilus/pectin, citrus  (ACIDOPHILUS PROBIOTIC ORAL) Take 0.5 mg by mouth.      Lactobacillus acidophilus (PROBIOTIC ORAL) Take by mouth daily.      levonorgestreL (LILETTA) 18.6 mcg/24 hrs (6 yrs) 52 mg intrauterine device intrauterine device 1 kit by intrauterine route once.      metFORMIN XR (GLUCOPHAGE-XR) 750 mg 24 hr tablet TAKE 2 TABLETS DAILY WITH DINNER 180 tablet 3    nystatin (MYCOSTATIN) 100,000 unit/gram cream Apply topically 2 (two) times a day.      omeprazole (PriLOSEC) 40 mg capsule TAKE 1 CAPSULE DAILY BEFORE BREAKFAST 90 capsule 3    rosuvastatin (CRESTOR) 5 mg tablet Take 1 tablet (5 mg total) by mouth daily. 90 tablet 3    semaglutide (WEGOVY) 1 mg/0.5 mL subcutaneous injection Inject 1 mg under the skin every (seven) 7 days. 2 mL 3    spironolactone (ALDACTONE) 100 mg tablet TAKE 1 TABLET DAILY 90 tablet 3    triamcinolone (KENALOG) 0.1 % cream Apply topically 2 (two) times a day.      chromium picolinate 200 mcg tablet Take by mouth. (Patient not taking: Reported on 10/11/2024)       No current facility-administered medications for this visit.       Objective   Vitals:    01/03/25 1023   BP: 138/80   Pulse: 90   Resp: 16   SpO2: 98%   Weight: 77.6 kg (171 lb)     Body mass index is 31.28 kg/m².    Physical Exam  Constitutional:       Appearance: Normal appearance. She is well-developed.   HENT:      Head: Normocephalic and atraumatic.   Eyes:      General: Lids are normal.   Cardiovascular:      Rate and Rhythm: Normal rate and regular rhythm.      Heart sounds: Normal heart sounds, S1 normal and S2 normal. No murmur heard.  Pulmonary:      Effort: Pulmonary effort is normal.      Breath sounds: Normal breath sounds. No decreased breath sounds, rhonchi or rales.   Abdominal:      General: Bowel sounds are normal.      Palpations: Abdomen is soft.      Tenderness: There is no abdominal tenderness.   Musculoskeletal:      Cervical back: Neck supple.   Skin:     General: Skin is warm and dry.   Neurological:       General: No focal deficit present.      Mental Status: She is alert and oriented to person, place, and time.      Cranial Nerves: No cranial nerve deficit.   Psychiatric:         Mood and Affect: Mood normal.         Behavior: Behavior normal.         Thought Content: Thought content normal.         Judgment: Judgment normal.         Assessment/Plan   Problem List Items Addressed This Visit          Circulatory    Arteriosclerosis     Saw cardiology at [facility]. Diagnosed with arteriosclerosis. No luminal abnormalities, per pt.  Statin dose was increased. Recommended close f/u with cardiology.             Musculoskeletal    Right hip pain - Primary     MRI showed complete tear of gluteus medius tendon and partial tear of gluteus minimus tendon.  Has an appointment today with orthopedics.  Taking Celebrex for pain            Endocrine/Metabolic    Obesity (BMI 30-39.9)     Baseline BMI 33.11, weight 181 lbs.  Taking metformin  mg daily.  Currently getting Zepbound 7.5 mg weekly through compound pharmacy and an online doctor.  Will try resending prescription for GLP-1 agonist for weight loss but also for cardiovascular benefits            Caren Guardado MD    1/3/2025

## 2025-01-03 NOTE — ASSESSMENT & PLAN NOTE
Saw cardiology at [facility]. Diagnosed with arteriosclerosis. No luminal abnormalities, per pt.  Statin dose was increased. Recommended close f/u with cardiology.

## 2025-01-03 NOTE — ASSESSMENT & PLAN NOTE
MRI showed complete tear of gluteus medius tendon and partial tear of gluteus minimus tendon.  Has an appointment today with orthopedics.  Taking Celebrex for pain

## 2025-01-03 NOTE — ASSESSMENT & PLAN NOTE
Baseline BMI 33.11, weight 181 lbs.  Taking metformin  mg daily.  Currently getting Zepbound 7.5 mg weekly through compound pharmacy and an online doctor.  Will try resending prescription for GLP-1 agonist for weight loss but also for cardiovascular benefits

## 2025-01-15 ENCOUNTER — OFFICE VISIT (OUTPATIENT)
Dept: INTERNAL MEDICINE | Facility: CLINIC | Age: 55
End: 2025-01-15
Payer: COMMERCIAL

## 2025-01-15 VITALS
HEART RATE: 63 BPM | BODY MASS INDEX: 30.36 KG/M2 | HEIGHT: 62 IN | TEMPERATURE: 98.3 F | OXYGEN SATURATION: 99 % | SYSTOLIC BLOOD PRESSURE: 110 MMHG | DIASTOLIC BLOOD PRESSURE: 80 MMHG | WEIGHT: 165 LBS

## 2025-01-15 DIAGNOSIS — J06.9 ACUTE URI: Primary | ICD-10-CM

## 2025-01-15 PROCEDURE — 99213 OFFICE O/P EST LOW 20 MIN: CPT | Performed by: NURSE PRACTITIONER

## 2025-01-15 PROCEDURE — 3079F DIAST BP 80-89 MM HG: CPT | Performed by: NURSE PRACTITIONER

## 2025-01-15 PROCEDURE — 3074F SYST BP LT 130 MM HG: CPT | Performed by: NURSE PRACTITIONER

## 2025-01-15 PROCEDURE — 3008F BODY MASS INDEX DOCD: CPT | Performed by: NURSE PRACTITIONER

## 2025-01-15 RX ORDER — DOXYCYCLINE HYCLATE 100 MG
100 TABLET ORAL 2 TIMES DAILY
Qty: 14 TABLET | Refills: 0 | Status: SHIPPED | OUTPATIENT
Start: 2025-01-15 | End: 2025-01-22

## 2025-01-15 RX ORDER — MELOXICAM 15 MG/1
TABLET ORAL
COMMUNITY
Start: 2025-01-03 | End: 2025-03-27

## 2025-01-15 ASSESSMENT — ENCOUNTER SYMPTOMS
CHILLS: 0
COUGH: 1
SORE THROAT: 0
PALPITATIONS: 0
ABDOMINAL PAIN: 0
ABDOMINAL DISTENTION: 0
DIARRHEA: 0
SINUS PRESSURE: 1
SINUS PAIN: 1
VOMITING: 0
HEADACHES: 1
NAUSEA: 0
FEVER: 0
SHORTNESS OF BREATH: 0
WHEEZING: 0
RHINORRHEA: 1
TROUBLE SWALLOWING: 0

## 2025-01-15 NOTE — PROGRESS NOTES
Internal Medicine Note       Reason for visit: Sinus Pain and Clogged Ears       HPI   Aurora Cedillo is a 54 y.o. female who presents with sinus pain and clogged.ears. she states she has surgery schedule next week to fix a tear. No fever or chills.   States her son has pneumonia and her daughter had an emergency appendectomy and she is caring for both of her children      Past Medical History:   Diagnosis Date    Allergic rhinitis     Anxiety 5 years ago    Arteriosclerosis 01/03/2025    Arthritis     Back pain     BMI 40.0-44.9, adult (CMS/[facility])     Cellulitis     COVID-19 virus infection 09/19/2022    Depression 06/28/2012    Dysfunction of both eustachian tubes     Essential hypertension, benign 06/28/2012    GERD (gastroesophageal reflux disease) After lap band    Gout 15-20 years ago    No longer have    High cholesterol 20 years ago    Hyperlipidemia     Irregular menses     Plantar fasciitis 20 years ago    Right bundle branch block 06/28/2012    Sensorineural hearing loss, bilateral     Shingles     on head, age 20's    Sinusitis     Skin abnormalities All my life    Several skin issues some related to allergies, stress    Skin rash     Sleep apnea     Snoring     UTI (urinary tract infection)     Varicose veins of lower extremity     Vitamin D deficiency     Yeast infection      Past Surgical History   Procedure Laterality Date    Carpal tunnel release  25 years ago    Corrected with surgery    Colonoscopy      Endometrial biopsy  2022    Knee arthroscopy  15 years ago    Knee surgery  7 years ago    Laparoscopic gastric banding  15 years ago?    Unsure of exact timeframe    Partial knee arthroplasty Left     Sinus surgery      Vaginal delivery       Social History     Social History Narrative    Children- 2 sons (19, 13), 1 daughter (16). 9/2024    Caffeine - coffee 1 daily. Iced tea, unsweetened    Exercise- yoga , PT    Diet- intermittent fasting, weekdays    Pets- none.      Family History    Problem Relation Name Age of Onset    Atrial fibrillation Biological Mother Aurora Benson     Anxiety disorder Biological Mother Aurora Benson     Atrial fibrillation Biological Father Chris Conteh     Stroke Biological Father Chris Conteh         with cognitive impairment    Heart disease Biological Father Chris Conteh     Hypertension Biological Father Chris Conteh     Mental illness Biological Father Chris Conteh     Obesity Biological Father Chris Conteh     Atrial fibrillation Biological Brother      Stroke Biological Brother           of CVA age 52    ADD / ADHD Biological Daughter      Anxiety disorder Biological Daughter      No Known Problems Biological Son oldest     Autism spectrum disorder Biological Son youngest         high functioning    ADD / ADHD Biological Son youngest         oldest, declines meds     Neomycin, Losartan, Neomycin-bacitracnzn-polymyxnb, Penicillins, Latex, and Miconazole  Current Outpatient Medications   Medication Sig Dispense Refill    BIOTIN ORAL Take 5,000 mg by mouth daily.      buPROPion XL (WELLBUTRIN XL) 150 mg 24 hr tablet TAKE 1 TABLET DAILY. TAKE WITH 300 MG TABLETS (TOTAL 450 MG) 90 tablet 3    buPROPion XL (WELLBUTRIN XL) 300 mg 24 hr tablet TAKE 1 TABLET DAILY. TAKE WITH 150 MG TABLETS (TOTAL 450 MG) 90 tablet 3    chromium picolinate 200 mcg tablet Take by mouth.      clioquinol-hydrocortisone (ALA-GILLES) 3-0.5 % cream       crisaborole (EUCRISA) 2 % ointment Eucrisa 2 % topical ointment      doxycycline hyclate (VIBRA-TABS) 100 mg tablet Take 1 tablet (100 mg total) by mouth 2 (two) times a day for 7 days. 14 tablet 0    fluticasone propionate (FLONASE) 50 mcg/actuation nasal spray Administer 1 spray into each nostril daily. 16 g 3    hydrochlorothiazide (HYDRODIURIL) 25 mg tablet Take 0.5 tablets (12.5 mg total) by mouth daily. 90 tablet 1    ipratropium (ATROVENT) 42 mcg (0.06 %) nasal spray Administer 1-2 sprays into each nostril 4 (four) times a day as needed  for rhinitis. 15 mL 1    L. acidophilus/pectin, citrus (ACIDOPHILUS PROBIOTIC ORAL) Take 0.5 mg by mouth.      Lactobacillus acidophilus (PROBIOTIC ORAL) Take by mouth daily.      levonorgestreL (LILETTA) 18.6 mcg/24 hrs (6 yrs) 52 mg intrauterine device intrauterine device 1 kit by intrauterine route once.      meloxicam (MOBIC) 15 mg tablet take 1 tablet by mouth every day for 30 days      metFORMIN XR (GLUCOPHAGE-XR) 750 mg 24 hr tablet TAKE 2 TABLETS DAILY WITH DINNER 180 tablet 3    nystatin (MYCOSTATIN) 100,000 unit/gram cream Apply topically 2 (two) times a day.      omeprazole (PriLOSEC) 40 mg capsule TAKE 1 CAPSULE DAILY BEFORE BREAKFAST 90 capsule 3    rosuvastatin (CRESTOR) 5 mg tablet Take 1 tablet (5 mg total) by mouth daily. 90 tablet 3    semaglutide (WEGOVY) 1 mg/0.5 mL subcutaneous injection Inject 1 mg under the skin every (seven) 7 days. 2 mL 3    spironolactone (ALDACTONE) 100 mg tablet TAKE 1 TABLET DAILY 90 tablet 3    triamcinolone (KENALOG) 0.1 % cream Apply topically 2 (two) times a day.       No current facility-administered medications for this visit.       Review of Systems   Constitutional:  Negative for chills and fever.   HENT:  Positive for congestion, ear pain (pressure), postnasal drip, rhinorrhea (green to clear), sinus pressure (off and on , releif with flonase), sinus pain and sneezing. Negative for sore throat and trouble swallowing.    Respiratory:  Positive for cough (green). Negative for shortness of breath and wheezing.    Cardiovascular:  Negative for chest pain and palpitations.   Gastrointestinal:  Negative for abdominal distention, abdominal pain, diarrhea, nausea and vomiting.   Neurological:  Positive for headaches (mild).       Objective   Vitals:    01/15/25 1450   BP: 110/80   Pulse: 63   Temp: 36.8 °C (98.3 °F)   SpO2: 99%       Physical Exam  Vitals reviewed.   Constitutional:       Appearance: Normal appearance.   HENT:      Right Ear: Tympanic membrane and ear  canal normal.      Left Ear: Tympanic membrane and ear canal normal.      Mouth/Throat:      Mouth: Mucous membranes are moist.      Pharynx: No oropharyngeal exudate or posterior oropharyngeal erythema.   Eyes:      Conjunctiva/sclera: Conjunctivae normal.   Cardiovascular:      Rate and Rhythm: Normal rate and regular rhythm.      Heart sounds: Normal heart sounds.   Pulmonary:      Effort: Pulmonary effort is normal.      Breath sounds: Normal breath sounds.   Abdominal:      General: Bowel sounds are normal. There is no distension.      Palpations: Abdomen is soft.      Tenderness: There is no abdominal tenderness.   Musculoskeletal:      Cervical back: Neck supple.   Skin:     General: Skin is warm and dry.   Neurological:      Mental Status: She is alert.         Lab Results   Component Value Date    WBC 8.6 10/10/2024    HGB 14.2 10/10/2024     10/10/2024    CHOL 165 02/28/2024    TRIG 102 02/28/2024    HDL 62 02/28/2024    ALT 14 09/27/2024    AST 16 09/27/2024     10/03/2024    K 4.2 10/03/2024    CREATININE 0.68 10/03/2024    TSH 2.91 09/27/2024          Current Outpatient Medications:     BIOTIN ORAL, Take 5,000 mg by mouth daily., Disp: , Rfl:     buPROPion XL (WELLBUTRIN XL) 150 mg 24 hr tablet, TAKE 1 TABLET DAILY. TAKE WITH 300 MG TABLETS (TOTAL 450 MG), Disp: 90 tablet, Rfl: 3    buPROPion XL (WELLBUTRIN XL) 300 mg 24 hr tablet, TAKE 1 TABLET DAILY. TAKE WITH 150 MG TABLETS (TOTAL 450 MG), Disp: 90 tablet, Rfl: 3    chromium picolinate 200 mcg tablet, Take by mouth., Disp: , Rfl:     clioquinol-hydrocortisone (ALA-GILLES) 3-0.5 % cream, , Disp: , Rfl:     crisaborole (EUCRISA) 2 % ointment, Eucrisa 2 % topical ointment, Disp: , Rfl:     doxycycline hyclate (VIBRA-TABS) 100 mg tablet, Take 1 tablet (100 mg total) by mouth 2 (two) times a day for 7 days., Disp: 14 tablet, Rfl: 0    fluticasone propionate (FLONASE) 50 mcg/actuation nasal spray, Administer 1 spray into each nostril daily.,  Disp: 16 g, Rfl: 3    hydrochlorothiazide (HYDRODIURIL) 25 mg tablet, Take 0.5 tablets (12.5 mg total) by mouth daily., Disp: 90 tablet, Rfl: 1    ipratropium (ATROVENT) 42 mcg (0.06 %) nasal spray, Administer 1-2 sprays into each nostril 4 (four) times a day as needed for rhinitis., Disp: 15 mL, Rfl: 1    L. acidophilus/pectin, citrus (ACIDOPHILUS PROBIOTIC ORAL), Take 0.5 mg by mouth., Disp: , Rfl:     Lactobacillus acidophilus (PROBIOTIC ORAL), Take by mouth daily., Disp: , Rfl:     levonorgestreL (LILETTA) 18.6 mcg/24 hrs (6 yrs) 52 mg intrauterine device intrauterine device, 1 kit by intrauterine route once., Disp: , Rfl:     meloxicam (MOBIC) 15 mg tablet, take 1 tablet by mouth every day for 30 days, Disp: , Rfl:     metFORMIN XR (GLUCOPHAGE-XR) 750 mg 24 hr tablet, TAKE 2 TABLETS DAILY WITH DINNER, Disp: 180 tablet, Rfl: 3    nystatin (MYCOSTATIN) 100,000 unit/gram cream, Apply topically 2 (two) times a day., Disp: , Rfl:     omeprazole (PriLOSEC) 40 mg capsule, TAKE 1 CAPSULE DAILY BEFORE BREAKFAST, Disp: 90 capsule, Rfl: 3    rosuvastatin (CRESTOR) 5 mg tablet, Take 1 tablet (5 mg total) by mouth daily., Disp: 90 tablet, Rfl: 3    semaglutide (WEGOVY) 1 mg/0.5 mL subcutaneous injection, Inject 1 mg under the skin every (seven) 7 days., Disp: 2 mL, Rfl: 3    spironolactone (ALDACTONE) 100 mg tablet, TAKE 1 TABLET DAILY, Disp: 90 tablet, Rfl: 3    triamcinolone (KENALOG) 0.1 % cream, Apply topically 2 (two) times a day., Disp: , Rfl:       Assessment   Diagnoses and all orders for this visit:    Acute URI (Primary)  Assessment & Plan:  Patient will start doxycycline 100 mg p.o. twice daily for 7 days.  She will increase her fluids, rest, Tylenol or Advil as needed.  She will continue with her Flonase 2 sprays in each nostril daily.  She has been using sinus rinses and may continue with those.  She was instructed to notify her surgeon that she is starting on an antibiotic for upper respiratory infection.   Patient will have a flu/COVID/RSV testing completed for thoroughness.    Orders:  -     COVID-19, FLU A+B AND RSV [facility]    Other orders  -     doxycycline hyclate (VIBRA-TABS) 100 mg tablet; Take 1 tablet (100 mg total) by mouth 2 (two) times a day for 7 days.            ISRRAEL Mcdaniel  1/15/2025  3:02 PM

## 2025-01-15 NOTE — ASSESSMENT & PLAN NOTE
Patient will start doxycycline 100 mg p.o. twice daily for 7 days.  She will increase her fluids, rest, Tylenol or Advil as needed.  She will continue with her Flonase 2 sprays in each nostril daily.  She has been using sinus rinses and may continue with those.  She was instructed to notify her surgeon that she is starting on an antibiotic for upper respiratory infection.  Patient will have a flu/COVID/RSV testing completed for thoroughness.

## 2025-01-17 LAB
FLUAV RNA NPH QL NAA+PROBE: DETECTED
FLUBV RNA NPH QL NAA+PROBE: NOT DETECTED
LC TEST INFORMATION:: ABNORMAL
RSV RNA NPH QL NAA+PROBE: NOT DETECTED
SARS-COV-2 RNA NPH QL NAA+PROBE: NOT DETECTED

## 2025-01-20 ENCOUNTER — TELEPHONE (OUTPATIENT)
Dept: INTERNAL MEDICINE | Facility: CLINIC | Age: 55
End: 2025-01-20
Payer: COMMERCIAL

## 2025-01-20 NOTE — TELEPHONE ENCOUNTER
I informed the patient that she tested positive for flu.  The patient stated that she has no symptoms at all but her daughter did have the flu and she has been taking care of her.  The patient stated that she did inform her surgeon.

## 2025-03-03 NOTE — TELEPHONE ENCOUNTER
Medicine Refill Request    Last Office Visit: 1/15/2025   Last Consult Visit: Visit date not found  Last Telemedicine Visit: 1/12/2024 Arabella Johnson CRNP    Next Appointment: 4/25/2025      Current Outpatient Medications:     BIOTIN ORAL, Take 5,000 mg by mouth daily., Disp: , Rfl:     buPROPion XL (WELLBUTRIN XL) 150 mg 24 hr tablet, TAKE 1 TABLET DAILY. TAKE WITH 300 MG TABLETS (TOTAL 450 MG), Disp: 90 tablet, Rfl: 3    buPROPion XL (WELLBUTRIN XL) 300 mg 24 hr tablet, TAKE 1 TABLET DAILY. TAKE WITH 150 MG TABLETS (TOTAL 450 MG), Disp: 90 tablet, Rfl: 3    chromium picolinate 200 mcg tablet, Take by mouth., Disp: , Rfl:     clioquinol-hydrocortisone (ALA-GILLES) 3-0.5 % cream, , Disp: , Rfl:     crisaborole (EUCRISA) 2 % ointment, Eucrisa 2 % topical ointment, Disp: , Rfl:     fluticasone propionate (FLONASE) 50 mcg/actuation nasal spray, Administer 1 spray into each nostril daily., Disp: 16 g, Rfl: 3    hydrochlorothiazide (HYDRODIURIL) 25 mg tablet, Take 0.5 tablets (12.5 mg total) by mouth daily., Disp: 90 tablet, Rfl: 1    ipratropium (ATROVENT) 42 mcg (0.06 %) nasal spray, Administer 1-2 sprays into each nostril 4 (four) times a day as needed for rhinitis., Disp: 15 mL, Rfl: 1    L. acidophilus/pectin, citrus (ACIDOPHILUS PROBIOTIC ORAL), Take 0.5 mg by mouth., Disp: , Rfl:     Lactobacillus acidophilus (PROBIOTIC ORAL), Take by mouth daily., Disp: , Rfl:     levonorgestreL (LILETTA) 18.6 mcg/24 hrs (6 yrs) 52 mg intrauterine device intrauterine device, 1 kit by intrauterine route once., Disp: , Rfl:     meloxicam (MOBIC) 15 mg tablet, take 1 tablet by mouth every day for 30 days, Disp: , Rfl:     metFORMIN XR (GLUCOPHAGE-XR) 750 mg 24 hr tablet, TAKE 2 TABLETS DAILY WITH DINNER, Disp: 180 tablet, Rfl: 3    nystatin (MYCOSTATIN) 100,000 unit/gram cream, Apply topically 2 (two) times a day., Disp: , Rfl:     omeprazole (PriLOSEC) 40 mg capsule, TAKE 1 CAPSULE DAILY BEFORE BREAKFAST, Disp: 90 capsule, Rfl:  "3    rosuvastatin (CRESTOR) 5 mg tablet, Take 1 tablet (5 mg total) by mouth daily., Disp: 90 tablet, Rfl: 3    semaglutide (WEGOVY) 1 mg/0.5 mL subcutaneous injection, Inject 1 mg under the skin every (seven) 7 days., Disp: 2 mL, Rfl: 3    spironolactone (ALDACTONE) 100 mg tablet, TAKE 1 TABLET DAILY, Disp: 90 tablet, Rfl: 3    triamcinolone (KENALOG) 0.1 % cream, Apply topically 2 (two) times a day., Disp: , Rfl:     BP Readings from Last 3 Encounters:   01/15/25 110/80   01/03/25 138/80   10/16/24 120/82       Recent Lab results:  Lab Results   Component Value Date    CHOL 165 02/28/2024   ,   Lab Results   Component Value Date    HDL 62 02/28/2024   ,   Lab Results   Component Value Date    LDLCALC 85 02/28/2024   ,   Lab Results   Component Value Date    TRIG 102 02/28/2024        Lab Results   Component Value Date    GLUCOSE 72 10/03/2024    GLUCOSE NEGATIVE 10/03/2024   , No results found for: \"HGBA1C\"      Lab Results   Component Value Date    CREATININE 0.68 10/03/2024       Lab Results   Component Value Date    TSH 2.91 09/27/2024         No results found for: \"HGBA1C\"  "

## 2025-03-04 RX ORDER — SPIRONOLACTONE 100 MG/1
TABLET, FILM COATED ORAL
Qty: 90 TABLET | Refills: 3 | Status: SHIPPED | OUTPATIENT
Start: 2025-03-04

## 2025-03-07 NOTE — ASSESSMENT & PLAN NOTE
Today's wt: 169# / 1317 REE / 30.7 BMI / 36.7% BF / 46% H2O     Macro goals discussed: 1200 calories /  grams protein (35%) / 40 grams fat (35%) / <90 grams carb (30%)   -Aim for high fiber -high fiber food items include vegetables and fruit, dayne seeds/nuts, avocado   -Supplements discussed: [place], pill form of Benefiber/Metameucil     Recommend food logging in paper or notes tiff vs. Baritastic tiff   -identify if/when feeling Acid Reflux symptoms    Food timing goal: Aim for 12 hour fast maxiumum   -experiment with 1 24 hour fast 1 day per week, ok to consume up to 1 500 calorie meal on fasting day       
07-Mar-2025 14:04:50

## 2025-03-26 SDOH — ECONOMIC STABILITY: FOOD INSECURITY: WITHIN THE PAST 12 MONTHS, THE FOOD YOU BOUGHT JUST DIDN'T LAST AND YOU DIDN'T HAVE MONEY TO GET MORE.: NEVER TRUE

## 2025-03-26 SDOH — ECONOMIC STABILITY: INCOME INSECURITY: IN THE LAST 12 MONTHS, WAS THERE A TIME WHEN YOU WERE NOT ABLE TO PAY THE MORTGAGE OR RENT ON TIME?: NO

## 2025-03-26 SDOH — ECONOMIC STABILITY: FOOD INSECURITY: WITHIN THE PAST 12 MONTHS, YOU WORRIED THAT YOUR FOOD WOULD RUN OUT BEFORE YOU GOT MONEY TO BUY MORE.: NEVER TRUE

## 2025-03-26 SDOH — ECONOMIC STABILITY: TRANSPORTATION INSECURITY
IN THE PAST 12 MONTHS, HAS LACK OF TRANSPORTATION KEPT YOU FROM MEETINGS, WORK, OR FROM GETTING THINGS NEEDED FOR DAILY LIVING?: NO

## 2025-03-26 SDOH — ECONOMIC STABILITY: TRANSPORTATION INSECURITY
IN THE PAST 12 MONTHS, HAS THE LACK OF TRANSPORTATION KEPT YOU FROM MEDICAL APPOINTMENTS OR FROM GETTING MEDICATIONS?: NO

## 2025-03-26 ASSESSMENT — SOCIAL DETERMINANTS OF HEALTH (SDOH): IN THE PAST 12 MONTHS, HAS THE ELECTRIC, GAS, OIL, OR WATER COMPANY THREATENED TO SHUT OFF SERVICE IN YOUR HOME?: NO

## 2025-03-27 ENCOUNTER — OFFICE VISIT (OUTPATIENT)
Dept: INTERNAL MEDICINE | Facility: CLINIC | Age: 55
End: 2025-03-27
Payer: COMMERCIAL

## 2025-03-27 VITALS
RESPIRATION RATE: 17 BRPM | HEART RATE: 72 BPM | WEIGHT: 157 LBS | OXYGEN SATURATION: 100 % | HEIGHT: 62 IN | TEMPERATURE: 98.2 F | DIASTOLIC BLOOD PRESSURE: 76 MMHG | BODY MASS INDEX: 28.89 KG/M2 | SYSTOLIC BLOOD PRESSURE: 112 MMHG

## 2025-03-27 DIAGNOSIS — F32.89 OTHER DEPRESSION: ICD-10-CM

## 2025-03-27 DIAGNOSIS — I25.10 CORONARY ARTERY CALCIFICATION SEEN ON CAT SCAN: ICD-10-CM

## 2025-03-27 DIAGNOSIS — E66.9 OBESITY (BMI 30-39.9): ICD-10-CM

## 2025-03-27 DIAGNOSIS — I10 ESSENTIAL HYPERTENSION, BENIGN: ICD-10-CM

## 2025-03-27 DIAGNOSIS — H81.11 BENIGN PAROXYSMAL POSITIONAL VERTIGO OF RIGHT EAR: ICD-10-CM

## 2025-03-27 DIAGNOSIS — E78.49 OTHER HYPERLIPIDEMIA: ICD-10-CM

## 2025-03-27 DIAGNOSIS — K59.00 CONSTIPATION, UNSPECIFIED CONSTIPATION TYPE: ICD-10-CM

## 2025-03-27 DIAGNOSIS — Z00.00 ENCOUNTER FOR ANNUAL PHYSICAL EXAM: Primary | ICD-10-CM

## 2025-03-27 PROCEDURE — 99396 PREV VISIT EST AGE 40-64: CPT | Performed by: INTERNAL MEDICINE

## 2025-03-27 PROCEDURE — 3078F DIAST BP <80 MM HG: CPT | Performed by: INTERNAL MEDICINE

## 2025-03-27 PROCEDURE — 3074F SYST BP LT 130 MM HG: CPT | Performed by: INTERNAL MEDICINE

## 2025-03-27 PROCEDURE — 3008F BODY MASS INDEX DOCD: CPT | Performed by: INTERNAL MEDICINE

## 2025-03-27 RX ORDER — SIMVASTATIN 40 MG/1
40 TABLET, FILM COATED ORAL NIGHTLY
COMMUNITY

## 2025-03-27 ASSESSMENT — ENCOUNTER SYMPTOMS
SLEEP DISTURBANCE: 0
CHILLS: 0
FREQUENCY: 0
PALPITATIONS: 0
FEVER: 0
BRUISES/BLEEDS EASILY: 0
ARTHRALGIAS: 0
NUMBNESS: 0
DIFFICULTY URINATING: 0
JOINT SWELLING: 0
SEIZURES: 0
NERVOUS/ANXIOUS: 0
DYSURIA: 0
SHORTNESS OF BREATH: 0
DYSPHORIC MOOD: 0
COUGH: 0
WEAKNESS: 0
ABDOMINAL PAIN: 0
HEADACHES: 0
NECK PAIN: 0
UNEXPECTED WEIGHT CHANGE: 0
CONSTIPATION: 1
FATIGUE: 0
DIARRHEA: 0
DIZZINESS: 1
SORE THROAT: 0
SINUS PRESSURE: 0
BACK PAIN: 0

## 2025-03-27 NOTE — ASSESSMENT & PLAN NOTE
Attributes to increased dose of rosuvastatin and metformin.  Rosuvastatin was switched to simvastatin and metformin was discontinued.  Increased dietary fiber.  Taking stool softeners and getting adequate hydration.  Overall improved       
BP well-controlled.  Continue HCTZ 12.5 mg daily       
Maximum weight was 181 lbs with BMI 33.1.  Has been getting compound semaglutide, managed by a weight specialist.  Current weight 157 lbs with BMI 28.7.       
No LDL on file.  Patient states last LDL with cardiology was 65, at goal, but since then, has switched rosuvastatin to simvastatin.  Recommended to call cardiology for repeat blood test orders to ensure her LDL remains at goal       
Patient reports CT coronary calcium score of 99.  Cardiology increased rosuvastatin from 5 mg to 20 mg but developed constipation.  Was switched to simvastatin 40 mg instead.  Advised to discuss switching back to rosuvastatin at a lower dose and monitor for recurrent constipation.  Patient understands her LDL goal is <70.  Will call cardiology to request lab orders since cardiology is managing her statin       
Recent episode.  Saw ENT.       
Reviewed lab results from the fall.  At goal except lipid panel.  UTD with pap smear. Scheduled for mammogram. UTD with colonoscopy. UTD with vaccines. UTD with skin/eye exams.       
Stable on Wellbutrin 450 mg daily       
No

## 2025-03-27 NOTE — PROGRESS NOTES
Subjective      Patient ID: Aurora Cedillo is a 54 y.o. female.    HPI    Patient presents for a physical.  Has been seeing cardiology at [facility].  CT coronary calcium score was 99, per patient.  Her rosuvastatin was increased from 5 mg to 20 mg but it caused constipation. She was switched to simvastatin 40 mg.  Also felt the metformin could be contributing to the constipation so she stopped about 4 days ago.  The rosuvastatin was stopped about 3 weeks ago.  Was also recommended to increase dietary fiber and take stool softeners as needed.  Patient feels her constipation is improved.  Hoping to be able to stop medications that she does not need.  Currently on spironolactone 100 mg daily for her skin.  She would like to try cutting it down if possible.  She is currently taking compound semaglutide.  Not sure of the dose.  Initially tried tirzepatide but it was no longer going to be available in compound form.  Semaglutide may be also discontinued as a compound in the near future.  Saw GYN and had pap smear yesterday with Dr. Rand.  Scheduled for mammogram at [facility] next week.  Up-to-date with colonoscopy and vaccines.  Up-to-date with eye and skin exams. Had right hip surgery on 1/21/2025 with Dr. Clemons at [facility].  Pain is improved significantly.  Doing physical therapy. Recently had R.ear pain and vertigo. Saw ENT. Diagnosed with BPPV.  No other new/acute concerns.    The following have been reviewed and updated as appropriate in this visit:     Allergies  Meds  Problems         Past Medical History:   Diagnosis Date    Allergic rhinitis     Anxiety 5 years ago    Arteriosclerosis 01/03/2025    Arthritis     Back pain     BMI 40.0-44.9, adult (CMS/[facility])     Cellulitis     COVID-19 virus infection 09/19/2022    Depression 06/28/2012    Dysfunction of both eustachian tubes     Essential hypertension, benign 06/28/2012    GERD (gastroesophageal reflux disease) After lap band    Gout 15-20 years ago     No longer have    High cholesterol 20 years ago    Hyperlipidemia     Irregular menses     Plantar fasciitis 20 years ago    Right bundle branch block 2012    Sensorineural hearing loss, bilateral     Shingles     on head, age 20's    Sinusitis     Skin abnormalities All my life    Several skin issues some related to allergies, stress    Skin rash     Sleep apnea     Snoring     UTI (urinary tract infection)     Varicose veins of lower extremity     Vitamin D deficiency     Yeast infection      Past Surgical History   Procedure Laterality Date    Carpal tunnel release  25 years ago    Corrected with surgery    Colonoscopy      Endometrial biopsy      Hip surgery Right     2025, Dr. Clemons, R.hip gluteus medius tendon repair    Knee arthroscopy  15 years ago    Knee surgery  7 years ago    Laparoscopic gastric banding  15 years ago?    Unsure of exact timeframe    Partial knee arthroplasty Left     Sinus surgery      Vaginal delivery       Family History   Problem Relation Name Age of Onset    Atrial fibrillation Biological Mother Aurora Benson     Anxiety disorder Biological Mother Aurora Benson     Atrial fibrillation Biological Father Chris Conteh     Stroke Biological Father Chris Conteh         with cognitive impairment    Heart disease Biological Father Chris Hernandezenijose     Hypertension Biological Father Chris Hernandezenijose     Mental illness Biological Father Chris Hernandezenijose     Obesity Biological Father Chris Hernandezenijose     Obesity Biological Brother      Hypertension Biological Brother      Atrial fibrillation Biological Brother      Stroke Biological Brother           of CVA age 52    ADD / ADHD Biological Daughter      Anxiety disorder Biological Daughter      ADD / ADHD Biological Son oldest     Autism spectrum disorder Biological Son youngest         high functioning    ADD / ADHD Biological Son youngest         oldest, declines meds     Social History     Socioeconomic History    Marital status:       Spouse name: None    Number of children: 3    Years of education: None    Highest education level: None   Occupational History    Occupation: [employer]   Tobacco Use    Smoking status: Former     Current packs/day: 0.00     Average packs/day: 0.5 packs/day for 5.0 years (2.5 ttl pk-yrs)     Types: Cigarettes     Start date: 1988     Quit date: 1993     Years since quittin.2    Smokeless tobacco: Never   Vaping Use    Vaping status: Never Used   Substance and Sexual Activity    Alcohol use: Yes     Comment: once in a while    Drug use: Not Currently    Sexual activity: Yes     Partners: Male     Birth control/protection: Abstinence, Coitus interruptus/Pulling Out, Condom Male     Comment: Unable to take pill due to side effects.   Social History Narrative    Children- 2 sons (19, 13), 1 daughter (16). 2024    Caffeine - coffee 1 daily. Iced tea, unsweetened    Exercise- yoga, walking, PT    Diet- intermittent fasting, weekdays    Pets- none.      Social Drivers of Health     Food Insecurity: No Food Insecurity (3/26/2025)    Hunger Vital Sign     Worried About Running Out of Food in the Last Year: Never true     Ran Out of Food in the Last Year: Never true   Transportation Needs: No Transportation Needs (3/26/2025)    PRAPARE - Transportation     Lack of Transportation (Medical): No     Lack of Transportation (Non-Medical): No   Housing Stability: Low Risk  (3/26/2025)    Housing Stability Vital Sign     Unable to Pay for Housing in the Last Year: No     Number of Times Moved in the Last Year: 0     Homeless in the Last Year: No       Review of Systems   Constitutional:  Negative for chills, fatigue, fever and unexpected weight change.   HENT:  Positive for ear pain. Negative for congestion, hearing loss, sinus pressure and sore throat.    Eyes:  Negative for visual disturbance.   Respiratory:  Negative for cough and shortness of breath.    Cardiovascular:  Negative for chest pain and palpitations.    Gastrointestinal:  Positive for constipation. Negative for abdominal pain and diarrhea.   Endocrine: Negative for cold intolerance and heat intolerance.   Genitourinary:  Negative for difficulty urinating, dysuria, frequency, menstrual problem (no periods on IUD), pelvic pain, vaginal bleeding and vaginal discharge.   Musculoskeletal:  Negative for arthralgias, back pain, joint swelling and neck pain.   Skin:  Negative for rash.   Allergic/Immunologic: Positive for environmental allergies. Negative for food allergies.   Neurological:  Positive for dizziness (spinning vertigo). Negative for seizures, weakness, numbness and headaches.   Hematological:  Does not bruise/bleed easily.   Psychiatric/Behavioral:  Negative for dysphoric mood and sleep disturbance. The patient is not nervous/anxious.        Allergies   Allergen Reactions    Neomycin Hives    Losartan Other (see comments)     Fatigue    Neomycin-Bacitracnzn-Polymyxnb Itching    Penicillins     Latex Rash    Miconazole Rash     Current Outpatient Medications   Medication Sig Dispense Refill    BIOTIN ORAL Take 5,000 mg by mouth daily.      buPROPion XL (WELLBUTRIN XL) 150 mg 24 hr tablet TAKE 1 TABLET DAILY. TAKE WITH 300 MG TABLETS (TOTAL 450 MG) 90 tablet 3    buPROPion XL (WELLBUTRIN XL) 300 mg 24 hr tablet TAKE 1 TABLET DAILY. TAKE WITH 150 MG TABLETS (TOTAL 450 MG) 90 tablet 3    cholecalciferol, vitamin D3, (DIALYVITE VITAMIN D ORAL) Take by mouth.      crisaborole (EUCRISA) 2 % ointment Eucrisa 2 % topical ointment      fluticasone propionate (FLONASE) 50 mcg/actuation nasal spray Administer 1 spray into each nostril daily. 16 g 3    hydrochlorothiazide (HYDRODIURIL) 25 mg tablet Take 0.5 tablets (12.5 mg total) by mouth daily. 90 tablet 1    ipratropium (ATROVENT) 42 mcg (0.06 %) nasal spray Administer 1-2 sprays into each nostril 4 (four) times a day as needed for rhinitis. 15 mL 1    L. acidophilus/pectin, citrus (ACIDOPHILUS PROBIOTIC ORAL) Take  "0.5 mg by mouth.      levonorgestreL (LILETTA) 18.6 mcg/24 hrs (6 yrs) 52 mg intrauterine device intrauterine device 1 kit by intrauterine route once.      nystatin (MYCOSTATIN) 100,000 unit/gram cream Apply topically 2 (two) times a day.      omeprazole (PriLOSEC) 40 mg capsule TAKE 1 CAPSULE DAILY BEFORE BREAKFAST 90 capsule 3    SEMAGLUTIDE, WEIGHT LOSS, SUBQ Inject 40 units on syringe (0.4ml=1mg) subcutaneously (under the skin) once weekly (same day each week) for 28 days. Rotate injection sites. Discard punctured vial(s) after 28 days      simvastatin (ZOCOR) 40 mg tablet Take 40 mg by mouth nightly.      spironolactone (ALDACTONE) 100 mg tablet TAKE 1 TABLET DAILY 90 tablet 3    clioquinol-hydrocortisone (ALA-GILLES) 3-0.5 % cream  (Patient not taking: Reported on 3/27/2025)      semaglutide (WEGOVY) 1 mg/0.5 mL subcutaneous injection Inject 1 mg under the skin every (seven) 7 days. (Patient not taking: Reported on 3/27/2025) 2 mL 3    triamcinolone (KENALOG) 0.1 % cream Apply topically 2 (two) times a day. (Patient not taking: Reported on 3/27/2025)       No current facility-administered medications for this visit.       Objective   Vitals:    03/27/25 1054   BP: 112/76   Pulse: 72   Resp: 17   Temp: 36.8 °C (98.2 °F)   TempSrc: Oral   SpO2: 100%   Weight: 71.2 kg (157 lb)   Height: 1.575 m (5' 2\")     Body mass index is 28.72 kg/m².    Physical Exam  Constitutional:       Appearance: Normal appearance. She is well-developed.   HENT:      Head: Normocephalic and atraumatic.      Right Ear: Ear canal and external ear normal. A middle ear effusion (with air bubbles) is present.      Left Ear: Ear canal and external ear normal. A middle ear effusion (with air bubbles) is present.      Nose: Nose normal.      Mouth/Throat:      Mouth: Mucous membranes are moist.      Pharynx: Oropharynx is clear.   Eyes:      General: Lids are normal.      Conjunctiva/sclera: Conjunctivae normal.      Pupils: Pupils are equal, " round, and reactive to light.   Neck:      Thyroid: No thyromegaly.   Cardiovascular:      Rate and Rhythm: Normal rate and regular rhythm.      Heart sounds: Normal heart sounds, S1 normal and S2 normal. No murmur heard.  Pulmonary:      Effort: Pulmonary effort is normal. No respiratory distress.      Breath sounds: Normal breath sounds. No decreased breath sounds, wheezing, rhonchi or rales.   Abdominal:      General: Bowel sounds are normal. There is no distension.      Palpations: Abdomen is soft.      Tenderness: There is no abdominal tenderness. There is no guarding or rebound.   Musculoskeletal:         General: Normal range of motion.      Cervical back: Normal range of motion and neck supple.   Skin:     General: Skin is warm and dry.      Findings: No rash.   Neurological:      General: No focal deficit present.      Mental Status: She is alert and oriented to person, place, and time.      Cranial Nerves: No cranial nerve deficit.      Sensory: No sensory deficit.      Gait: Gait normal.   Psychiatric:         Mood and Affect: Mood normal.         Behavior: Behavior normal.         Thought Content: Thought content normal.         Judgment: Judgment normal.         Assessment & Plan  Encounter for annual physical exam  Reviewed lab results from the fall.  At goal except lipid panel.  UTD with pap smear. Scheduled for mammogram. UTD with colonoscopy. UTD with vaccines. UTD with skin/eye exams.       Coronary artery calcification seen on CAT scan  Patient reports CT coronary calcium score of 99.  Cardiology increased rosuvastatin from 5 mg to 20 mg but developed constipation.  Was switched to simvastatin 40 mg instead.  Advised to discuss switching back to rosuvastatin at a lower dose and monitor for recurrent constipation.  Patient understands her LDL goal is <70.  Will call cardiology to request lab orders since cardiology is managing her statin       Essential hypertension, benign  BP well-controlled.   Continue HCTZ 12.5 mg daily       Other depression  Stable on Wellbutrin 450 mg daily       Other hyperlipidemia  No LDL on file.  Patient states last LDL with cardiology was 65, at goal, but since then, has switched rosuvastatin to simvastatin.  Recommended to call cardiology for repeat blood test orders to ensure her LDL remains at goal       Obesity (BMI 30-39.9)  Maximum weight was 181 lbs with BMI 33.1.  Has been getting compound semaglutide, managed by a weight specialist.  Current weight 157 lbs with BMI 28.7.       Constipation, unspecified constipation type  Attributes to increased dose of rosuvastatin and metformin.  Rosuvastatin was switched to simvastatin and metformin was discontinued.  Increased dietary fiber.  Taking stool softeners and getting adequate hydration.  Overall improved       Benign paroxysmal positional vertigo of right ear  Recent episode.  Saw ENT.           No follow-ups on file.    Caren Guardado MD    3/27/2025

## 2025-03-31 ENCOUNTER — TELEPHONE (OUTPATIENT)
Dept: INTERNAL MEDICINE | Facility: CLINIC | Age: 55
End: 2025-03-31

## 2025-04-15 ENCOUNTER — APPOINTMENT (OUTPATIENT)
Dept: URBAN - METROPOLITAN AREA CLINIC 362 | Facility: CLINIC | Age: 55
Setting detail: DERMATOLOGY
End: 2025-04-15

## 2025-04-15 DIAGNOSIS — L50.8 OTHER URTICARIA: ICD-10-CM

## 2025-04-15 PROCEDURE — 99213 OFFICE O/P EST LOW 20 MIN: CPT

## 2025-04-15 PROCEDURE — ? PRESCRIPTION MEDICATION MANAGEMENT

## 2025-04-15 PROCEDURE — ? COUNSELING

## 2025-04-15 ASSESSMENT — LOCATION SIMPLE DESCRIPTION DERM
LOCATION SIMPLE: LEFT FOREARM
LOCATION SIMPLE: RIGHT FOREARM
LOCATION SIMPLE: RIGHT ANTERIOR NECK
LOCATION SIMPLE: LEFT PRETIBIAL REGION
LOCATION SIMPLE: RIGHT PRETIBIAL REGION
LOCATION SIMPLE: LEFT ANTERIOR NECK

## 2025-04-15 ASSESSMENT — LOCATION DETAILED DESCRIPTION DERM
LOCATION DETAILED: RIGHT SUPERIOR LATERAL NECK
LOCATION DETAILED: LEFT SUPERIOR LATERAL NECK
LOCATION DETAILED: LEFT DISTAL PRETIBIAL REGION
LOCATION DETAILED: RIGHT VENTRAL DISTAL FOREARM
LOCATION DETAILED: RIGHT DISTAL PRETIBIAL REGION
LOCATION DETAILED: LEFT VENTRAL DISTAL FOREARM

## 2025-04-15 ASSESSMENT — LOCATION ZONE DERM
LOCATION ZONE: LEG
LOCATION ZONE: ARM
LOCATION ZONE: NECK

## 2025-04-15 NOTE — HPI: RASH
What Type Of Note Output Would You Prefer (Optional)?: Standard Output
Is This A New Presentation, Or A Follow-Up?: Rash
Additional History: Has done pinprick allergy test. Rash on forearms, neck, lower legs and ankles. Wears all cotton socks. Happening on and off for a few years. Takes Benadryl regularly PRN. Hydrocortisone works for itch. Has not used triamcinolone

## 2025-04-15 NOTE — PROCEDURE: PRESCRIPTION MEDICATION MANAGEMENT
Render In Strict Bullet Format?: No
Detail Level: Detailed
Initiate Treatment: OTC Allegra 180 mg or xyzal 5 mg instead of Zyrtec (patient experiences some tiredness when taking). Take for a 3-5 days after hives and itch is relieved \\nTriamcinolone 0.1% cream (already prescribed)- Apply to AA twice a day for no more than 2 weeks out of month.
Plan: Patient suspicious that rash is due to allergic reaction from dyes or fabrics from socks, coats, clothing. She notes she has used Eucrisa in the past for acute hand dermatitis in the past that worked very well, but is not covered by her insurance.\\n\\nDiscussed further testing (labs/biopsy) patient declines at this time. Is just looking for a regimen to better control flares.
Continue Regimen: May still use Benadryl at night PRN

## 2025-04-17 ENCOUNTER — OFFICE VISIT (OUTPATIENT)
Dept: INTERNAL MEDICINE | Facility: CLINIC | Age: 55
End: 2025-04-17
Payer: COMMERCIAL

## 2025-04-17 VITALS
WEIGHT: 157 LBS | OXYGEN SATURATION: 98 % | HEART RATE: 80 BPM | TEMPERATURE: 98.3 F | BODY MASS INDEX: 28.89 KG/M2 | HEIGHT: 62 IN | SYSTOLIC BLOOD PRESSURE: 134 MMHG | DIASTOLIC BLOOD PRESSURE: 80 MMHG

## 2025-04-17 DIAGNOSIS — J02.9 SORE THROAT: Primary | ICD-10-CM

## 2025-04-17 LAB
EXPIRATION DATE: NORMAL
Lab: NORMAL
POCT MANUFACTURER: NORMAL
S PYO AG THROAT QL: NEGATIVE

## 2025-04-17 PROCEDURE — 3075F SYST BP GE 130 - 139MM HG: CPT | Performed by: NURSE PRACTITIONER

## 2025-04-17 PROCEDURE — 3008F BODY MASS INDEX DOCD: CPT | Performed by: NURSE PRACTITIONER

## 2025-04-17 PROCEDURE — 87880 STREP A ASSAY W/OPTIC: CPT | Performed by: NURSE PRACTITIONER

## 2025-04-17 PROCEDURE — 3079F DIAST BP 80-89 MM HG: CPT | Performed by: NURSE PRACTITIONER

## 2025-04-17 PROCEDURE — 99213 OFFICE O/P EST LOW 20 MIN: CPT | Performed by: NURSE PRACTITIONER

## 2025-04-17 RX ORDER — IPRATROPIUM BROMIDE 42 UG/1
1-2 SPRAY, METERED NASAL 4 TIMES DAILY PRN
Qty: 45 ML | Refills: 1 | Status: SHIPPED | OUTPATIENT
Start: 2025-04-17 | End: 2026-04-17

## 2025-04-17 RX ORDER — AZITHROMYCIN 250 MG/1
TABLET, FILM COATED ORAL
Qty: 6 TABLET | Refills: 0 | Status: SHIPPED | OUTPATIENT
Start: 2025-04-17 | End: 2025-04-22

## 2025-04-17 RX ORDER — AZELASTINE 1 MG/ML
1 SPRAY, METERED NASAL 2 TIMES DAILY
Qty: 90 ML | Refills: 1 | Status: SHIPPED | OUTPATIENT
Start: 2025-04-17 | End: 2025-05-17

## 2025-04-17 RX ORDER — FLUTICASONE PROPIONATE 50 MCG
1 SPRAY, SUSPENSION (ML) NASAL DAILY
Qty: 48 G | Refills: 3 | Status: SHIPPED | OUTPATIENT
Start: 2025-04-17

## 2025-04-17 ASSESSMENT — ENCOUNTER SYMPTOMS
SINUS PAIN: 0
FEVER: 0
COUGH: 0
CHILLS: 0
NAUSEA: 0
ABDOMINAL DISTENTION: 0
SHORTNESS OF BREATH: 0
TROUBLE SWALLOWING: 1
WHEEZING: 0
PALPITATIONS: 0
SINUS PRESSURE: 0
VOMITING: 0
RHINORRHEA: 1
HEADACHES: 0
SORE THROAT: 1
DIARRHEA: 0
ABDOMINAL PAIN: 0

## 2025-04-17 NOTE — ASSESSMENT & PLAN NOTE
Rapid strep was negative.  Patient will start a Z-Ken as directed.  Continue with warm beverages.  Throat culture was sent to the lab.  Patient will be notified of the results when they are available.  If symptoms worsen patient will return to the office.

## 2025-04-17 NOTE — PROGRESS NOTES
Internal Medicine Note       Reason for visit: R Ear Pain and R Side Sore Throat       HPI   Aurora Cedillo is a 54 y.o. female who presents with right ear and right sided sore throat. Pain with swallowing. Hot beverages help. Sx for a fw days. Had been sick a few weeks prior. Did see ENT several weeks ago.   She needs refills on her nasal sprays      Past Medical History:   Diagnosis Date    Allergic rhinitis     Anxiety 5 years ago    Arteriosclerosis 01/03/2025    Arthritis     Back pain     BMI 40.0-44.9, adult (CMS/[facility])     Cellulitis     COVID-19 virus infection 09/19/2022    Depression 06/28/2012    Dysfunction of both eustachian tubes     Essential hypertension, benign 06/28/2012    GERD (gastroesophageal reflux disease) After lap band    Gout 15-20 years ago    No longer have    High cholesterol 20 years ago    Hyperlipidemia     Irregular menses     Plantar fasciitis 20 years ago    Right bundle branch block 06/28/2012    Sensorineural hearing loss, bilateral     Shingles     on head, age 20's    Sinusitis     Skin abnormalities All my life    Several skin issues some related to allergies, stress    Skin rash     Sleep apnea     Snoring     UTI (urinary tract infection)     Varicose veins of lower extremity     Vitamin D deficiency     Yeast infection      Past Surgical History   Procedure Laterality Date    Carpal tunnel release  25 years ago    Corrected with surgery    Colonoscopy      Endometrial biopsy  2022    Hip surgery Right     1/2025, Dr. Clemons R.hip gluteus medius tendon repair    Knee arthroscopy  15 years ago    Knee surgery  7 years ago    Laparoscopic gastric banding  15 years ago?    Unsure of exact timeframe    Partial knee arthroplasty Left     Sinus surgery      Vaginal delivery       Social History     Social History Narrative    Children- 2 sons (19, 13), 1 daughter (16). 9/2024    Caffeine - coffee 1 daily. Iced tea, unsweetened    Exercise- yoga, walking, PT    Diet-  intermittent fasting, weekdays    Pets- none.      Family History   Problem Relation Name Age of Onset    Atrial fibrillation Biological Mother Aurora Benson     Anxiety disorder Biological Mother Aurora Benson     Atrial fibrillation Biological Father Chris Conteh     Stroke Biological Father Chris Conteh         with cognitive impairment    Heart disease Biological Father Chris Conteh     Hypertension Biological Father Chris Conteh     Mental illness Biological Father Chris Conteh     Obesity Biological Father Chris Conteh     Obesity Biological Brother      Hypertension Biological Brother      Atrial fibrillation Biological Brother      Stroke Biological Brother           of CVA age 52    ADD / ADHD Biological Daughter      Anxiety disorder Biological Daughter      ADD / ADHD Biological Son oldest     Autism spectrum disorder Biological Son youngest         high functioning    ADD / ADHD Biological Son youngest         oldest, declines meds     Neomycin, Bronopol, Ciprofloxacin, Gold au 198, Losartan, Neomycin-bacitracnzn-polymyxnb, Other, Penicillins, Latex, and Miconazole  Current Outpatient Medications   Medication Sig Dispense Refill    azelastine (ASTELIN) 137 mcg (0.1 %) nasal spray Administer 1 spray into each nostril 2 (two) times a day. Use in each nostril as directed. 90 mL 1    azithromycin (ZITHROMAX) 250 mg tablet Take 2 tablets the first day, then 1 tablet daily for 4 days. 6 tablet 0    BIOTIN ORAL Take 5,000 mg by mouth daily.      buPROPion XL (WELLBUTRIN XL) 150 mg 24 hr tablet TAKE 1 TABLET DAILY. TAKE WITH 300 MG TABLETS (TOTAL 450 MG) 90 tablet 3    buPROPion XL (WELLBUTRIN XL) 300 mg 24 hr tablet TAKE 1 TABLET DAILY. TAKE WITH 150 MG TABLETS (TOTAL 450 MG) 90 tablet 3    cholecalciferol, vitamin D3, (DIALYVITE VITAMIN D ORAL) Take by mouth.      clioquinol-hydrocortisone (ALA-GILLES) 3-0.5 % cream PRN      crisaborole (EUCRISA) 2 % ointment Eucrisa 2 % topical ointment      fluticasone  propionate (FLONASE) 50 mcg/actuation nasal spray Administer 1 spray into each nostril daily. 48 g 3    hydrochlorothiazide (HYDRODIURIL) 25 mg tablet Take 0.5 tablets (12.5 mg total) by mouth daily. 90 tablet 1    ipratropium (ATROVENT) 42 mcg (0.06 %) nasal spray Administer 1-2 sprays into each nostril 4 (four) times a day as needed for rhinitis. 45 mL 1    L. acidophilus/pectin, citrus (ACIDOPHILUS PROBIOTIC ORAL) Take 0.5 mg by mouth.      levonorgestreL (LILETTA) 18.6 mcg/24 hrs (6 yrs) 52 mg intrauterine device intrauterine device 1 kit by intrauterine route once.      nystatin (MYCOSTATIN) 100,000 unit/gram cream Apply topically 2 (two) times a day.      omeprazole (PriLOSEC) 40 mg capsule TAKE 1 CAPSULE DAILY BEFORE BREAKFAST 90 capsule 3    SEMAGLUTIDE, WEIGHT LOSS, SUBQ Inject 40 units on syringe (0.4ml=1mg) subcutaneously (under the skin) once weekly (same day each week) for 28 days. Rotate injection sites. Discard punctured vial(s) after 28 days      simvastatin (ZOCOR) 40 mg tablet Take 40 mg by mouth nightly.      spironolactone (ALDACTONE) 100 mg tablet TAKE 1 TABLET DAILY 90 tablet 3    triamcinolone (KENALOG) 0.1 % cream Apply topically 2 (two) times a day.       No current facility-administered medications for this visit.       Review of Systems   Constitutional:  Negative for chills and fever.   HENT:  Positive for congestion, ear pain, postnasal drip, rhinorrhea (clear), sore throat and trouble swallowing. Negative for sinus pressure and sinus pain.    Respiratory:  Negative for cough, shortness of breath and wheezing.    Cardiovascular:  Negative for chest pain and palpitations.   Gastrointestinal:  Negative for abdominal distention, abdominal pain, diarrhea, nausea and vomiting.   Neurological:  Negative for headaches.       Objective   Vitals:    04/17/25 1023   BP: 134/80   Pulse:    Temp:    SpO2:        Physical Exam  Vitals reviewed.   Constitutional:       Appearance: Normal appearance.    HENT:      Right Ear: Tympanic membrane and ear canal normal.      Left Ear: Tympanic membrane and ear canal normal.   Cardiovascular:      Rate and Rhythm: Normal rate and regular rhythm.      Heart sounds: Normal heart sounds.   Pulmonary:      Effort: Pulmonary effort is normal.      Breath sounds: Normal breath sounds.   Abdominal:      General: Bowel sounds are normal. There is no distension.      Palpations: Abdomen is soft.      Tenderness: There is no abdominal tenderness.   Musculoskeletal:      Cervical back: Neck supple.   Lymphadenopathy:      Cervical: No cervical adenopathy.   Skin:     General: Skin is warm and dry.   Neurological:      Mental Status: She is alert.         Lab Results   Component Value Date    WBC 8.6 10/10/2024    HGB 14.2 10/10/2024     10/10/2024    CHOL 165 02/28/2024    TRIG 102 02/28/2024    HDL 62 02/28/2024    ALT 14 09/27/2024    AST 16 09/27/2024     10/03/2024    K 4.2 10/03/2024    CREATININE 0.68 10/03/2024    TSH 2.91 09/27/2024          Current Outpatient Medications:     azelastine (ASTELIN) 137 mcg (0.1 %) nasal spray, Administer 1 spray into each nostril 2 (two) times a day. Use in each nostril as directed., Disp: 90 mL, Rfl: 1    azithromycin (ZITHROMAX) 250 mg tablet, Take 2 tablets the first day, then 1 tablet daily for 4 days., Disp: 6 tablet, Rfl: 0    BIOTIN ORAL, Take 5,000 mg by mouth daily., Disp: , Rfl:     buPROPion XL (WELLBUTRIN XL) 150 mg 24 hr tablet, TAKE 1 TABLET DAILY. TAKE WITH 300 MG TABLETS (TOTAL 450 MG), Disp: 90 tablet, Rfl: 3    buPROPion XL (WELLBUTRIN XL) 300 mg 24 hr tablet, TAKE 1 TABLET DAILY. TAKE WITH 150 MG TABLETS (TOTAL 450 MG), Disp: 90 tablet, Rfl: 3    cholecalciferol, vitamin D3, (DIALYVITE VITAMIN D ORAL), Take by mouth., Disp: , Rfl:     clioquinol-hydrocortisone (ALA-GILLES) 3-0.5 % cream, PRN, Disp: , Rfl:     crisaborole (EUCRISA) 2 % ointment, Eucrisa 2 % topical ointment, Disp: , Rfl:     fluticasone  propionate (FLONASE) 50 mcg/actuation nasal spray, Administer 1 spray into each nostril daily., Disp: 48 g, Rfl: 3    hydrochlorothiazide (HYDRODIURIL) 25 mg tablet, Take 0.5 tablets (12.5 mg total) by mouth daily., Disp: 90 tablet, Rfl: 1    ipratropium (ATROVENT) 42 mcg (0.06 %) nasal spray, Administer 1-2 sprays into each nostril 4 (four) times a day as needed for rhinitis., Disp: 45 mL, Rfl: 1    L. acidophilus/pectin, citrus (ACIDOPHILUS PROBIOTIC ORAL), Take 0.5 mg by mouth., Disp: , Rfl:     levonorgestreL (LILETTA) 18.6 mcg/24 hrs (6 yrs) 52 mg intrauterine device intrauterine device, 1 kit by intrauterine route once., Disp: , Rfl:     nystatin (MYCOSTATIN) 100,000 unit/gram cream, Apply topically 2 (two) times a day., Disp: , Rfl:     omeprazole (PriLOSEC) 40 mg capsule, TAKE 1 CAPSULE DAILY BEFORE BREAKFAST, Disp: 90 capsule, Rfl: 3    SEMAGLUTIDE, WEIGHT LOSS, SUBQ, Inject 40 units on syringe (0.4ml=1mg) subcutaneously (under the skin) once weekly (same day each week) for 28 days. Rotate injection sites. Discard punctured vial(s) after 28 days, Disp: , Rfl:     simvastatin (ZOCOR) 40 mg tablet, Take 40 mg by mouth nightly., Disp: , Rfl:     spironolactone (ALDACTONE) 100 mg tablet, TAKE 1 TABLET DAILY, Disp: 90 tablet, Rfl: 3    triamcinolone (KENALOG) 0.1 % cream, Apply topically 2 (two) times a day., Disp: , Rfl:       Assessment   Diagnoses and all orders for this visit:    Sore throat (Primary)  Assessment & Plan:  Rapid strep was negative.  Patient will start a Z-Ken as directed.  Continue with warm beverages.  Throat culture was sent to the lab.  Patient will be notified of the results when they are available.  If symptoms worsen patient will return to the office.    Orders:  -     Throat culture, comprehensive  -     POCT rapid strep A    Other orders  -     azithromycin (ZITHROMAX) 250 mg tablet; Take 2 tablets the first day, then 1 tablet daily for 4 days.  -     fluticasone propionate (FLONASE)  50 mcg/actuation nasal spray; Administer 1 spray into each nostril daily.  -     ipratropium (ATROVENT) 42 mcg (0.06 %) nasal spray; Administer 1-2 sprays into each nostril 4 (four) times a day as needed for rhinitis.  -     azelastine (ASTELIN) 137 mcg (0.1 %) nasal spray; Administer 1 spray into each nostril 2 (two) times a day. Use in each nostril as directed.            ISRRAEL Mcdaniel  4/17/2025  10:10 AM

## 2025-04-20 LAB
BACTERIA SPEC RESP CULT: NORMAL
BACTERIA SPEC RESP CULT: NORMAL

## 2025-04-21 ENCOUNTER — TELEPHONE (OUTPATIENT)
Dept: INTERNAL MEDICINE | Facility: CLINIC | Age: 55
End: 2025-04-21
Payer: COMMERCIAL

## 2025-04-21 ENCOUNTER — RESULTS FOLLOW-UP (OUTPATIENT)
Dept: INTERNAL MEDICINE | Facility: CLINIC | Age: 55
End: 2025-04-21

## 2025-05-27 ENCOUNTER — OFFICE VISIT (OUTPATIENT)
Dept: INTERNAL MEDICINE | Facility: CLINIC | Age: 55
End: 2025-05-27
Payer: COMMERCIAL

## 2025-05-27 VITALS
OXYGEN SATURATION: 99 % | TEMPERATURE: 98.7 F | BODY MASS INDEX: 27.23 KG/M2 | HEART RATE: 90 BPM | WEIGHT: 148 LBS | HEIGHT: 62 IN | DIASTOLIC BLOOD PRESSURE: 84 MMHG | SYSTOLIC BLOOD PRESSURE: 118 MMHG

## 2025-05-27 DIAGNOSIS — J06.9 ACUTE URI: Primary | ICD-10-CM

## 2025-05-27 DIAGNOSIS — K59.00 CONSTIPATION, UNSPECIFIED CONSTIPATION TYPE: ICD-10-CM

## 2025-05-27 PROCEDURE — 3008F BODY MASS INDEX DOCD: CPT | Performed by: NURSE PRACTITIONER

## 2025-05-27 PROCEDURE — 99214 OFFICE O/P EST MOD 30 MIN: CPT | Performed by: NURSE PRACTITIONER

## 2025-05-27 PROCEDURE — 3079F DIAST BP 80-89 MM HG: CPT | Performed by: NURSE PRACTITIONER

## 2025-05-27 PROCEDURE — 3074F SYST BP LT 130 MM HG: CPT | Performed by: NURSE PRACTITIONER

## 2025-05-27 RX ORDER — AZITHROMYCIN 250 MG/1
TABLET, FILM COATED ORAL
Qty: 6 TABLET | Refills: 0 | Status: SHIPPED | OUTPATIENT
Start: 2025-05-27 | End: 2025-06-01

## 2025-07-30 DIAGNOSIS — E78.5 HYPERLIPIDEMIA, UNSPECIFIED HYPERLIPIDEMIA TYPE: ICD-10-CM

## 2025-07-30 DIAGNOSIS — I10 ESSENTIAL HYPERTENSION, BENIGN: Primary | ICD-10-CM

## 2025-08-01 LAB
ALBUMIN SERPL-MCNC: 4.2 G/DL (ref 3.6–5.1)
ALBUMIN/GLOB SERPL: 1.8 (CALC) (ref 1–2.5)
ALP SERPL-CCNC: 59 U/L (ref 37–153)
ALT SERPL-CCNC: 7 U/L (ref 6–29)
AST SERPL-CCNC: 11 U/L (ref 10–35)
BASOPHILS # BLD AUTO: 39 CELLS/UL (ref 0–200)
BASOPHILS NFR BLD AUTO: 0.6 %
BILIRUB SERPL-MCNC: 0.6 MG/DL (ref 0.2–1.2)
BUN SERPL-MCNC: 9 MG/DL (ref 7–25)
BUN/CREAT SERPL: NORMAL (CALC) (ref 6–22)
CALCIUM SERPL-MCNC: 9.7 MG/DL (ref 8.6–10.4)
CHLORIDE SERPL-SCNC: 101 MMOL/L (ref 98–110)
CHOLEST SERPL-MCNC: 161 MG/DL
CHOLEST/HDLC SERPL: 2.8 (CALC)
CO2 SERPL-SCNC: 28 MMOL/L (ref 20–32)
CREAT SERPL-MCNC: 0.74 MG/DL (ref 0.5–1.03)
EGFRCR SERPLBLD CKD-EPI 2021: 96 ML/MIN/1.73M2
EOSINOPHIL # BLD AUTO: 78 CELLS/UL (ref 15–500)
EOSINOPHIL NFR BLD AUTO: 1.2 %
ERYTHROCYTE [DISTWIDTH] IN BLOOD BY AUTOMATED COUNT: 14.6 % (ref 11–15)
GLOBULIN SER CALC-MCNC: 2.4 G/DL (CALC) (ref 1.9–3.7)
GLUCOSE SERPL-MCNC: 78 MG/DL (ref 65–99)
HCT VFR BLD AUTO: 45.9 % (ref 35–45)
HDLC SERPL-MCNC: 57 MG/DL
HGB BLD-MCNC: 14.1 G/DL (ref 11.7–15.5)
LDLC SERPL CALC-MCNC: 87 MG/DL (CALC)
LYMPHOCYTES # BLD AUTO: 1352 CELLS/UL (ref 850–3900)
LYMPHOCYTES NFR BLD AUTO: 20.8 %
MCH RBC QN AUTO: 28.4 PG (ref 27–33)
MCHC RBC AUTO-ENTMCNC: 30.7 G/DL (ref 32–36)
MCV RBC AUTO: 92.5 FL (ref 80–100)
MONOCYTES # BLD AUTO: 488 CELLS/UL (ref 200–950)
MONOCYTES NFR BLD AUTO: 7.5 %
NEUTROPHILS # BLD AUTO: 4544 CELLS/UL (ref 1500–7800)
NEUTROPHILS NFR BLD AUTO: 69.9 %
NONHDLC SERPL-MCNC: 104 MG/DL (CALC)
PLATELET # BLD AUTO: 317 THOUSAND/UL (ref 140–400)
PMV BLD REES-ECKER: 10.1 FL (ref 7.5–12.5)
POTASSIUM SERPL-SCNC: 4.6 MMOL/L (ref 3.5–5.3)
PROT SERPL-MCNC: 6.6 G/DL (ref 6.1–8.1)
RBC # BLD AUTO: 4.96 MILLION/UL (ref 3.8–5.1)
SODIUM SERPL-SCNC: 136 MMOL/L (ref 135–146)
TRIGL SERPL-MCNC: 77 MG/DL
TSH SERPL-ACNC: 2.01 MIU/L
WBC # BLD AUTO: 6.5 THOUSAND/UL (ref 3.8–10.8)

## 2025-08-15 RX ORDER — BUPROPION HYDROCHLORIDE 300 MG/1
TABLET ORAL
Qty: 90 TABLET | Refills: 3 | Status: SHIPPED | OUTPATIENT
Start: 2025-08-15

## 2025-08-15 RX ORDER — BUPROPION HYDROCHLORIDE 150 MG/1
TABLET ORAL
Qty: 90 TABLET | Refills: 3 | Status: SHIPPED | OUTPATIENT
Start: 2025-08-15

## 2025-08-29 ENCOUNTER — OFFICE VISIT (OUTPATIENT)
Dept: INTERNAL MEDICINE | Facility: CLINIC | Age: 55
End: 2025-08-29
Payer: COMMERCIAL

## 2025-08-29 VITALS
HEIGHT: 62 IN | OXYGEN SATURATION: 98 % | HEART RATE: 71 BPM | SYSTOLIC BLOOD PRESSURE: 124 MMHG | TEMPERATURE: 98.5 F | WEIGHT: 133 LBS | BODY MASS INDEX: 24.48 KG/M2 | DIASTOLIC BLOOD PRESSURE: 86 MMHG

## 2025-08-29 DIAGNOSIS — J06.9 ACUTE URI: Primary | ICD-10-CM

## 2025-08-29 PROCEDURE — 3079F DIAST BP 80-89 MM HG: CPT | Performed by: NURSE PRACTITIONER

## 2025-08-29 PROCEDURE — 99213 OFFICE O/P EST LOW 20 MIN: CPT | Performed by: NURSE PRACTITIONER

## 2025-08-29 PROCEDURE — 3008F BODY MASS INDEX DOCD: CPT | Performed by: NURSE PRACTITIONER

## 2025-08-29 PROCEDURE — 3074F SYST BP LT 130 MM HG: CPT | Performed by: NURSE PRACTITIONER

## 2025-08-29 RX ORDER — ATORVASTATIN CALCIUM 40 MG/1
40 TABLET, FILM COATED ORAL DAILY
COMMUNITY

## 2025-08-29 RX ORDER — AZITHROMYCIN 250 MG/1
TABLET, FILM COATED ORAL
Qty: 6 TABLET | Refills: 0 | Status: SHIPPED | OUTPATIENT
Start: 2025-08-29 | End: 2025-09-03

## 2025-08-29 ASSESSMENT — ENCOUNTER SYMPTOMS
DIARRHEA: 0
SHORTNESS OF BREATH: 0
CHILLS: 0
SINUS PAIN: 1
SORE THROAT: 0
WHEEZING: 0
NAUSEA: 0
SINUS PRESSURE: 1
ABDOMINAL PAIN: 0
PALPITATIONS: 0
TROUBLE SWALLOWING: 0
FEVER: 0
HEADACHES: 1
COUGH: 1
RHINORRHEA: 1
FATIGUE: 1
VOMITING: 0

## 2025-08-31 ENCOUNTER — NURSE TRIAGE (OUTPATIENT)
Dept: PRIMARY CARE | Facility: CLINIC | Age: 55
End: 2025-08-31
Payer: COMMERCIAL

## 2025-08-31 RX ORDER — PROMETHAZINE HYDROCHLORIDE AND DEXTROMETHORPHAN HYDROBROMIDE 6.25; 15 MG/5ML; MG/5ML
5 SYRUP ORAL EVERY 6 HOURS PRN
Qty: 120 ML | Refills: 0 | Status: SHIPPED | OUTPATIENT
Start: 2025-08-31 | End: 2025-09-10